# Patient Record
Sex: FEMALE | Race: WHITE | NOT HISPANIC OR LATINO | Employment: OTHER | URBAN - METROPOLITAN AREA
[De-identification: names, ages, dates, MRNs, and addresses within clinical notes are randomized per-mention and may not be internally consistent; named-entity substitution may affect disease eponyms.]

---

## 2017-01-05 ENCOUNTER — ANESTHESIA EVENT (OUTPATIENT)
Dept: PERIOP | Facility: AMBULARY SURGERY CENTER | Age: 57
End: 2017-01-05
Payer: COMMERCIAL

## 2017-01-06 ENCOUNTER — ANESTHESIA (OUTPATIENT)
Dept: PERIOP | Facility: AMBULARY SURGERY CENTER | Age: 57
End: 2017-01-06
Payer: COMMERCIAL

## 2017-01-06 ENCOUNTER — HOSPITAL ENCOUNTER (OUTPATIENT)
Facility: AMBULARY SURGERY CENTER | Age: 57
Setting detail: OUTPATIENT SURGERY
Discharge: HOME | End: 2017-01-06
Attending: ANESTHESIOLOGY | Admitting: ANESTHESIOLOGY
Payer: COMMERCIAL

## 2017-01-06 ENCOUNTER — HOSPITAL ENCOUNTER (OUTPATIENT)
Dept: RADIOLOGY | Facility: HOSPITAL | Age: 57
Setting detail: OUTPATIENT SURGERY
Discharge: HOME | End: 2017-01-06
Payer: COMMERCIAL

## 2017-01-06 VITALS
DIASTOLIC BLOOD PRESSURE: 56 MMHG | RESPIRATION RATE: 16 BRPM | OXYGEN SATURATION: 97 % | HEART RATE: 65 BPM | SYSTOLIC BLOOD PRESSURE: 111 MMHG | TEMPERATURE: 96.7 F

## 2017-01-06 PROCEDURE — 72020 X-RAY EXAM OF SPINE 1 VIEW: CPT

## 2017-01-06 RX ORDER — PROPOFOL 10 MG/ML
INJECTION, EMULSION INTRAVENOUS AS NEEDED
Status: DISCONTINUED | OUTPATIENT
Start: 2017-01-06 | End: 2017-01-06 | Stop reason: SURG

## 2017-01-06 RX ORDER — BUPIVACAINE HYDROCHLORIDE 5 MG/ML
INJECTION, SOLUTION EPIDURAL; INTRACAUDAL AS NEEDED
Status: DISCONTINUED | OUTPATIENT
Start: 2017-01-06 | End: 2017-01-06 | Stop reason: HOSPADM

## 2017-01-06 RX ORDER — SODIUM CHLORIDE, SODIUM LACTATE, POTASSIUM CHLORIDE, CALCIUM CHLORIDE 600; 310; 30; 20 MG/100ML; MG/100ML; MG/100ML; MG/100ML
100 INJECTION, SOLUTION INTRAVENOUS CONTINUOUS
Status: DISCONTINUED | OUTPATIENT
Start: 2017-01-06 | End: 2017-01-06 | Stop reason: HOSPADM

## 2017-01-06 RX ORDER — LIDOCAINE HYDROCHLORIDE 10 MG/ML
INJECTION, SOLUTION EPIDURAL; INFILTRATION; INTRACAUDAL; PERINEURAL AS NEEDED
Status: DISCONTINUED | OUTPATIENT
Start: 2017-01-06 | End: 2017-01-06 | Stop reason: HOSPADM

## 2017-01-06 RX ADMIN — PROPOFOL 30 MG: 10 INJECTION, EMULSION INTRAVENOUS at 10:57

## 2017-01-06 RX ADMIN — SODIUM CHLORIDE, SODIUM LACTATE, POTASSIUM CHLORIDE, AND CALCIUM CHLORIDE 100 ML/HR: .6; .31; .03; .02 INJECTION, SOLUTION INTRAVENOUS at 10:04

## 2017-01-09 ENCOUNTER — ALLSCRIPTS OFFICE VISIT (OUTPATIENT)
Dept: OTHER | Facility: OTHER | Age: 57
End: 2017-01-09

## 2017-02-08 ENCOUNTER — ALLSCRIPTS OFFICE VISIT (OUTPATIENT)
Dept: OTHER | Facility: OTHER | Age: 57
End: 2017-02-08

## 2017-02-17 ENCOUNTER — HOSPITAL ENCOUNTER (OUTPATIENT)
Dept: RADIOLOGY | Facility: HOSPITAL | Age: 57
Setting detail: OUTPATIENT SURGERY
Discharge: HOME | End: 2017-02-17
Payer: COMMERCIAL

## 2017-02-17 ENCOUNTER — HOSPITAL ENCOUNTER (OUTPATIENT)
Facility: AMBULARY SURGERY CENTER | Age: 57
Setting detail: OUTPATIENT SURGERY
Discharge: HOME | End: 2017-02-17
Attending: ANESTHESIOLOGY | Admitting: ANESTHESIOLOGY
Payer: COMMERCIAL

## 2017-02-17 VITALS
RESPIRATION RATE: 18 BRPM | TEMPERATURE: 97.6 F | DIASTOLIC BLOOD PRESSURE: 54 MMHG | HEART RATE: 78 BPM | OXYGEN SATURATION: 99 % | SYSTOLIC BLOOD PRESSURE: 108 MMHG

## 2017-02-17 DIAGNOSIS — R52 PAIN: ICD-10-CM

## 2017-02-17 PROCEDURE — 72100 X-RAY EXAM L-S SPINE 2/3 VWS: CPT

## 2017-02-17 RX ORDER — BUPIVACAINE HYDROCHLORIDE 5 MG/ML
INJECTION, SOLUTION PERINEURAL AS NEEDED
Status: DISCONTINUED | OUTPATIENT
Start: 2017-02-17 | End: 2017-02-17 | Stop reason: HOSPADM

## 2017-02-17 RX ORDER — FENTANYL CITRATE 50 UG/ML
INJECTION, SOLUTION INTRAMUSCULAR; INTRAVENOUS AS NEEDED
Status: DISCONTINUED | OUTPATIENT
Start: 2017-02-17 | End: 2017-02-17 | Stop reason: HOSPADM

## 2017-02-17 RX ORDER — SODIUM CHLORIDE, SODIUM LACTATE, POTASSIUM CHLORIDE, CALCIUM CHLORIDE 600; 310; 30; 20 MG/100ML; MG/100ML; MG/100ML; MG/100ML
125 INJECTION, SOLUTION INTRAVENOUS CONTINUOUS
Status: DISCONTINUED | OUTPATIENT
Start: 2017-02-17 | End: 2017-02-17 | Stop reason: HOSPADM

## 2017-02-17 RX ORDER — MIDAZOLAM HYDROCHLORIDE 1 MG/ML
INJECTION INTRAMUSCULAR; INTRAVENOUS AS NEEDED
Status: DISCONTINUED | OUTPATIENT
Start: 2017-02-17 | End: 2017-02-17 | Stop reason: HOSPADM

## 2017-02-17 RX ADMIN — SODIUM CHLORIDE, SODIUM LACTATE, POTASSIUM CHLORIDE, AND CALCIUM CHLORIDE 125 ML/HR: .6; .31; .03; .02 INJECTION, SOLUTION INTRAVENOUS at 08:03

## 2017-03-02 ENCOUNTER — ALLSCRIPTS OFFICE VISIT (OUTPATIENT)
Dept: OTHER | Facility: OTHER | Age: 57
End: 2017-03-02

## 2017-04-10 ENCOUNTER — ALLSCRIPTS OFFICE VISIT (OUTPATIENT)
Dept: OTHER | Facility: OTHER | Age: 57
End: 2017-04-10

## 2017-04-10 DIAGNOSIS — Z12.31 ENCOUNTER FOR SCREENING MAMMOGRAM FOR MALIGNANT NEOPLASM OF BREAST: ICD-10-CM

## 2017-04-18 ENCOUNTER — GENERIC CONVERSION - ENCOUNTER (OUTPATIENT)
Dept: OTHER | Facility: OTHER | Age: 57
End: 2017-04-18

## 2017-04-28 ENCOUNTER — HOSPITAL ENCOUNTER (OUTPATIENT)
Dept: RADIOLOGY | Facility: HOSPITAL | Age: 57
Setting detail: OUTPATIENT SURGERY
Discharge: HOME | End: 2017-04-28
Payer: COMMERCIAL

## 2017-04-28 ENCOUNTER — HOSPITAL ENCOUNTER (OUTPATIENT)
Facility: AMBULARY SURGERY CENTER | Age: 57
Setting detail: OUTPATIENT SURGERY
Discharge: HOME | End: 2017-04-28
Attending: ANESTHESIOLOGY | Admitting: ANESTHESIOLOGY
Payer: COMMERCIAL

## 2017-04-28 VITALS
SYSTOLIC BLOOD PRESSURE: 112 MMHG | DIASTOLIC BLOOD PRESSURE: 53 MMHG | OXYGEN SATURATION: 100 % | RESPIRATION RATE: 20 BRPM | HEART RATE: 59 BPM | TEMPERATURE: 97 F

## 2017-04-28 PROCEDURE — 72070 X-RAY EXAM THORAC SPINE 2VWS: CPT

## 2017-04-28 RX ORDER — SODIUM CHLORIDE, SODIUM LACTATE, POTASSIUM CHLORIDE, CALCIUM CHLORIDE 600; 310; 30; 20 MG/100ML; MG/100ML; MG/100ML; MG/100ML
125 INJECTION, SOLUTION INTRAVENOUS CONTINUOUS
Status: DISCONTINUED | OUTPATIENT
Start: 2017-04-28 | End: 2017-04-28 | Stop reason: HOSPADM

## 2017-04-28 RX ORDER — LIDOCAINE HYDROCHLORIDE 10 MG/ML
INJECTION, SOLUTION EPIDURAL; INFILTRATION; INTRACAUDAL; PERINEURAL AS NEEDED
Status: DISCONTINUED | OUTPATIENT
Start: 2017-04-28 | End: 2017-04-28 | Stop reason: HOSPADM

## 2017-04-28 RX ORDER — MIDAZOLAM HYDROCHLORIDE 1 MG/ML
INJECTION INTRAMUSCULAR; INTRAVENOUS AS NEEDED
Status: DISCONTINUED | OUTPATIENT
Start: 2017-04-28 | End: 2017-04-28 | Stop reason: HOSPADM

## 2017-04-28 RX ORDER — BUPIVACAINE HYDROCHLORIDE 5 MG/ML
INJECTION, SOLUTION EPIDURAL; INTRACAUDAL AS NEEDED
Status: DISCONTINUED | OUTPATIENT
Start: 2017-04-28 | End: 2017-04-28 | Stop reason: HOSPADM

## 2017-04-28 RX ADMIN — SODIUM CHLORIDE, SODIUM LACTATE, POTASSIUM CHLORIDE, AND CALCIUM CHLORIDE 125 ML/HR: .6; .31; .03; .02 INJECTION, SOLUTION INTRAVENOUS at 07:48

## 2017-05-10 ENCOUNTER — ALLSCRIPTS OFFICE VISIT (OUTPATIENT)
Dept: OTHER | Facility: OTHER | Age: 57
End: 2017-05-10

## 2017-05-19 ENCOUNTER — GENERIC CONVERSION - ENCOUNTER (OUTPATIENT)
Dept: OTHER | Facility: OTHER | Age: 57
End: 2017-05-19

## 2017-05-23 ENCOUNTER — GENERIC CONVERSION - ENCOUNTER (OUTPATIENT)
Dept: OTHER | Facility: OTHER | Age: 57
End: 2017-05-23

## 2017-05-24 ENCOUNTER — ALLSCRIPTS OFFICE VISIT (OUTPATIENT)
Dept: OTHER | Facility: OTHER | Age: 57
End: 2017-05-24

## 2017-05-26 ENCOUNTER — GENERIC CONVERSION - ENCOUNTER (OUTPATIENT)
Dept: OTHER | Facility: OTHER | Age: 57
End: 2017-05-26

## 2017-05-30 ENCOUNTER — GENERIC CONVERSION - ENCOUNTER (OUTPATIENT)
Dept: OTHER | Facility: OTHER | Age: 57
End: 2017-05-30

## 2017-06-02 ENCOUNTER — GENERIC CONVERSION - ENCOUNTER (OUTPATIENT)
Dept: OTHER | Facility: OTHER | Age: 57
End: 2017-06-02

## 2017-06-09 ENCOUNTER — ALLSCRIPTS OFFICE VISIT (OUTPATIENT)
Dept: OTHER | Facility: OTHER | Age: 57
End: 2017-06-09

## 2017-06-16 ENCOUNTER — GENERIC CONVERSION - ENCOUNTER (OUTPATIENT)
Dept: OTHER | Facility: OTHER | Age: 57
End: 2017-06-16

## 2017-06-16 ENCOUNTER — HOSPITAL ENCOUNTER (OUTPATIENT)
Facility: AMBULARY SURGERY CENTER | Age: 57
Setting detail: OUTPATIENT SURGERY
Discharge: HOME | End: 2017-06-16
Attending: ANESTHESIOLOGY | Admitting: ANESTHESIOLOGY
Payer: COMMERCIAL

## 2017-06-16 ENCOUNTER — HOSPITAL ENCOUNTER (OUTPATIENT)
Dept: RADIOLOGY | Facility: HOSPITAL | Age: 57
Setting detail: OUTPATIENT SURGERY
Discharge: HOME | End: 2017-06-16
Payer: COMMERCIAL

## 2017-06-16 VITALS
HEART RATE: 68 BPM | BODY MASS INDEX: 26.52 KG/M2 | OXYGEN SATURATION: 99 % | SYSTOLIC BLOOD PRESSURE: 102 MMHG | RESPIRATION RATE: 18 BRPM | DIASTOLIC BLOOD PRESSURE: 51 MMHG | TEMPERATURE: 98.2 F | WEIGHT: 145 LBS

## 2017-06-16 PROCEDURE — 72200 X-RAY EXAM SI JOINTS: CPT

## 2017-06-16 RX ORDER — MIDAZOLAM HYDROCHLORIDE 1 MG/ML
INJECTION INTRAMUSCULAR; INTRAVENOUS AS NEEDED
Status: DISCONTINUED | OUTPATIENT
Start: 2017-06-16 | End: 2017-06-16 | Stop reason: HOSPADM

## 2017-06-16 RX ORDER — SODIUM CHLORIDE, SODIUM LACTATE, POTASSIUM CHLORIDE, CALCIUM CHLORIDE 600; 310; 30; 20 MG/100ML; MG/100ML; MG/100ML; MG/100ML
50 INJECTION, SOLUTION INTRAVENOUS CONTINUOUS
Status: DISCONTINUED | OUTPATIENT
Start: 2017-06-16 | End: 2017-06-16 | Stop reason: HOSPADM

## 2017-06-16 RX ORDER — BUPIVACAINE HYDROCHLORIDE 2.5 MG/ML
INJECTION, SOLUTION EPIDURAL; INFILTRATION; INTRACAUDAL AS NEEDED
Status: DISCONTINUED | OUTPATIENT
Start: 2017-06-16 | End: 2017-06-16 | Stop reason: HOSPADM

## 2017-06-16 RX ADMIN — SODIUM CHLORIDE, SODIUM LACTATE, POTASSIUM CHLORIDE, AND CALCIUM CHLORIDE 50 ML/HR: .6; .31; .03; .02 INJECTION, SOLUTION INTRAVENOUS at 07:28

## 2017-07-06 ENCOUNTER — GENERIC CONVERSION - ENCOUNTER (OUTPATIENT)
Dept: OTHER | Facility: OTHER | Age: 57
End: 2017-07-06

## 2017-07-10 ENCOUNTER — ALLSCRIPTS OFFICE VISIT (OUTPATIENT)
Dept: OTHER | Facility: OTHER | Age: 57
End: 2017-07-10

## 2017-08-08 ENCOUNTER — ALLSCRIPTS OFFICE VISIT (OUTPATIENT)
Dept: OTHER | Facility: OTHER | Age: 57
End: 2017-08-08

## 2017-08-10 ENCOUNTER — GENERIC CONVERSION - ENCOUNTER (OUTPATIENT)
Dept: OTHER | Facility: OTHER | Age: 57
End: 2017-08-10

## 2017-09-20 ENCOUNTER — GENERIC CONVERSION - ENCOUNTER (OUTPATIENT)
Dept: OTHER | Facility: OTHER | Age: 57
End: 2017-09-20

## 2017-09-20 LAB
A/G RATIO (HISTORICAL): 1.9 (ref 1.2–2.2)
ALBUMIN SERPL BCP-MCNC: 4.1 G/DL (ref 3.5–5.5)
ALP SERPL-CCNC: 103 IU/L (ref 39–117)
ALT SERPL W P-5'-P-CCNC: 6 IU/L (ref 0–32)
AST SERPL W P-5'-P-CCNC: 17 IU/L (ref 0–40)
BASOPHILS # BLD AUTO: 0 %
BASOPHILS # BLD AUTO: 0 X10E3/UL (ref 0–0.2)
BILIRUB SERPL-MCNC: 0.2 MG/DL (ref 0–1.2)
BUN SERPL-MCNC: 12 MG/DL (ref 6–24)
BUN/CREA RATIO (HISTORICAL): 14 (ref 9–23)
CALCIUM SERPL-MCNC: 8.9 MG/DL (ref 8.7–10.2)
CHLORIDE SERPL-SCNC: 98 MMOL/L (ref 96–106)
CHOLEST SERPL-MCNC: 119 MG/DL (ref 100–199)
CO2 SERPL-SCNC: 27 MMOL/L (ref 18–29)
CREAT SERPL-MCNC: 0.87 MG/DL (ref 0.57–1)
DEPRECATED RDW RBC AUTO: 17.8 % (ref 12.3–15.4)
EGFR AFRICAN AMERICAN (HISTORICAL): 86 ML/MIN/1.73
EGFR-AMERICAN CALC (HISTORICAL): 74 ML/MIN/1.73
EOSINOPHIL # BLD AUTO: 0.1 X10E3/UL (ref 0–0.4)
EOSINOPHIL # BLD AUTO: 2 %
GLUCOSE SERPL-MCNC: 89 MG/DL (ref 65–99)
HCT VFR BLD AUTO: 35.9 % (ref 34–46.6)
HDLC SERPL-MCNC: 68 MG/DL
HGB BLD-MCNC: 11.6 G/DL (ref 11.1–15.9)
IMM.GRANULOCYTES (CD4/8) (HISTORICAL): 0 %
IMM.GRANULOCYTES (CD4/8) (HISTORICAL): 0 X10E3/UL (ref 0–0.1)
LDLC SERPL CALC-MCNC: 33 MG/DL (ref 0–99)
LYMPHOCYTES # BLD AUTO: 2.2 X10E3/UL (ref 0.7–3.1)
LYMPHOCYTES # BLD AUTO: 30 %
MCH RBC QN AUTO: 26.2 PG (ref 26.6–33)
MCHC RBC AUTO-ENTMCNC: 32.3 G/DL (ref 31.5–35.7)
MCV RBC AUTO: 81 FL (ref 79–97)
MONOCYTES # BLD AUTO: 0.3 X10E3/UL (ref 0.1–0.9)
MONOCYTES (HISTORICAL): 5 %
NEUTROPHILS # BLD AUTO: 4.6 X10E3/UL (ref 1.4–7)
NEUTROPHILS # BLD AUTO: 63 %
PLATELET # BLD AUTO: 245 X10E3/UL (ref 150–379)
POTASSIUM SERPL-SCNC: 3.9 MMOL/L (ref 3.5–5.2)
RBC (HISTORICAL): 4.43 X10E6/UL (ref 3.77–5.28)
SODIUM SERPL-SCNC: 141 MMOL/L (ref 134–144)
TOT. GLOBULIN, SERUM (HISTORICAL): 2.2 G/DL (ref 1.5–4.5)
TOTAL PROTEIN (HISTORICAL): 6.3 G/DL (ref 6–8.5)
TRIGL SERPL-MCNC: 92 MG/DL (ref 0–149)
WBC # BLD AUTO: 7.3 X10E3/UL (ref 3.4–10.8)

## 2017-09-21 ENCOUNTER — GENERIC CONVERSION - ENCOUNTER (OUTPATIENT)
Dept: OTHER | Facility: OTHER | Age: 57
End: 2017-09-21

## 2017-09-21 LAB
25(OH)D3 SERPL-MCNC: 30.1 NG/ML (ref 30–100)
HEPATITIS C ANTIBODY (HISTORICAL): <0.1 S/CO RATIO (ref 0–0.9)
INTERPRETATION (HISTORICAL): NORMAL
TSH SERPL DL<=0.05 MIU/L-ACNC: 2.64 UIU/ML (ref 0.45–4.5)

## 2017-09-23 LAB — VITAMIN A LEVEL (HISTORICAL): 11 UG/DL (ref 20–65)

## 2017-09-25 ENCOUNTER — GENERIC CONVERSION - ENCOUNTER (OUTPATIENT)
Dept: OTHER | Facility: OTHER | Age: 57
End: 2017-09-25

## 2017-10-02 ENCOUNTER — GENERIC CONVERSION - ENCOUNTER (OUTPATIENT)
Dept: OTHER | Facility: OTHER | Age: 57
End: 2017-10-02

## 2017-10-19 ENCOUNTER — GENERIC CONVERSION - ENCOUNTER (OUTPATIENT)
Dept: OTHER | Facility: OTHER | Age: 57
End: 2017-10-19

## 2017-11-06 ENCOUNTER — ALLSCRIPTS OFFICE VISIT (OUTPATIENT)
Dept: OTHER | Facility: OTHER | Age: 57
End: 2017-11-06

## 2017-11-07 LAB
AMPHETAMINE URINE (HISTORICAL): NEGATIVE NG/ML
BARBITURATES (HISTORICAL): NEGATIVE NG/ML
BENZODIAZEPINES (HISTORICAL): NEGATIVE NG/ML
CANNABINOIDS (HISTORICAL): NEGATIVE NG/ML
COCAINE URINE (HISTORICAL): NEGATIVE NG/ML
METHADONE (HISTORICAL): NEGATIVE NG/ML
OPIATES (HISTORICAL): NEGATIVE NG/ML
PHENCYCLIDINE URINE (HISTORICAL): NEGATIVE NG/ML
PROPOXYPHENE (HISTORICAL): NEGATIVE NG/ML

## 2017-11-07 NOTE — PROGRESS NOTES
Assessment  1  Chronic back pain (724 5,338 29) (M54 9,G89 29)    Plan  Chronic back pain    · Follow-up visit in 1 month Evaluation and Treatment  Follow-up  Status: Complete -  Scheduling  Done: 85OYU3468 01:56PM  Disc degeneration, lumbar    · FentaNYL 50 MCG/HR Transdermal Patch 72 Hour; APPLY 1 PATCH EVERY 3  DAYS (take with 12 mcg patch)   · Lyrica 75 MG Oral Capsule; TAKE 1 CAPSULE 3 times daily   · OxyCODONE HCl - 5 MG Oral Tablet; TAKE 1 TABLET EVERY 4 HOURS AS  NEEDED FOR PAIN  Opiate use    · ([facility]ly [facility]) [id number] Drug-Scr; Status: In Progress - Specimen/Data Collected;   Done:  30NFV9661    Discussion/Summary    Chronic back pain - urine drug screen is negative, but is wearing her patch  Will send urine to lab [facility] for further evaluation  She is down to 50 mcg of fentanyl and after adjusting will consider further dose reduction  to get her mammogram done  of opiate medications discussed  They are controlled dangerous substance  Opioids are highly addictive, even when taken as prescribed and there is a significant risk of developing a physical or psychological dependence  If you mix opiate medications with sedatives, benzodiazepines or alcohol fatal respiratory depression can occur  Chief Complaint  f/u medication review and chronic pain evaluation rmklpn      History of Present Illness  She is down to the 50 mcg patch  she is using her oxycodone as needed  are adjusting her depression medication  She is following with her psychiatrist       Active Problems  1  Adult body mass index 29 0-29 9 (V85 25) (Z68 29)   2  Allergic rhinitis (477 9) (J30 9)   3  Atypical chest pain (786 59) (R07 89)   4  Backache (724 5) (M54 9)   5  Chronic back pain (724 5,338 29) (M54 9,G89 29)   6  Depression (311) (F32 9)   7  Disc degeneration, lumbar (722 52) (M51 36)   8  Fibromyalgia (729 1) (M79 7)   9  Headache (784 0) (R51)   10  Herpes simplex infection (054 9) (B00 9)   11  Hysterectomy   12   Insomnia (780 52) (G47 00)   13  Iron deficiency anemia (280 9) (D50 9)   14  Itching (698 9) (L29 9)   15  Long term use of drug (V58 69) (Z79 899)   16  Lumbago (724 2) (M54 5)   17  Lymphadenopathy (785 6) (R59 1)   18  Menopausal disorder (627 9) (N95 9)   19  Migraine headache (346 90) (G43 909)   20  Myalgia And Myositis (729 1)   21  Need for hepatitis C screening test (V73 89) (Z11 59)   22  Nicotine dependence (305 1) (F17 200)   23  Obesity (278 00) (E66 9)   24  Opiate use (305 50) (F11 90)   25  Osteoarthritis of left knee (715 96) (M17 12)   26  Phlebitis, superficial (451 9) (I80 9)   27  Screening mammogram, encounter for (V76 12) (Z12 31)   28  Status post gastric bypass for obesity (V45 86) (Z98 84)   29  Tobacco use (305 1) (Z72 0)   30  Vitamin A deficiency (264 9) (E50 9)   31  Vitamin D deficiency (268 9) (E55 9)    Past Medical History  1  Acute maxillary sinusitis (461 0) (J01 00)   2  History of Acute maxillary sinusitis, recurrence not specified (461 0) (J01 00)   3  Acute upper respiratory infection (465 9) (J06 9)   4  History of Bilateral cellulitis of lower leg (682 6) (L03 116,L03 115)   5  History of Cellulitis (682 9) (L03 90)   6  Contact dermatitis due to plant (692 6) (L25 5)   7  Contact dermatitis due to plant (692 6) (L25 5)   8  History of Cough (786 2) (R05)   9  Dermatitis (692 9) (L30 9)   10  History of Encounter for screening mammogram for malignant neoplasm of breast    (V76 12) (Z12 31)   11  History of Hip pain, unspecified laterality   12  History of acute bronchitis (V12 69) (Z87 09)   13  History of acute sinusitis (V12 69) (Z87 09)   14  History of acute sinusitis (V12 69) (Z87 09)   15  History of influenza (V12 09) (Z87 09)   16  History of nicotine dependence (V15 82) (Z87 891)   17  History of sunburn (V13 3) (Z87 2)   18  History of upper respiratory infection (V12 09) (Z87 09)   19  Influenza vaccine needed (V04 81) (Z23)   20  Influenza vaccine needed (V04 81) (Z23)   21  History of Initial Medicare annual wellness visit (V70 0) (Z00 00)   22  Other acute sinusitis (461 8) (J01 80)   23  History of Screening examination for pulmonary tuberculosis (V74 1) (Z11 1)   24  History of Screening for diabetes mellitus (V77 1) (Z13 1)   25  History of Screening for hyperlipidemia (V77 91) (K96 950)    Surgical History  1  History of Knee Arthroscopy (Therapeutic)   2  History of Knee Replacement    Family History  Mother    1  Family history of hypertension (V17 49) (Z82 49)  Father    2  Family history of hypertension (V17 49) (Z82 49)    Social History   · Current every day smoker (305 1) (F17 200)   · Tobacco use (305 1) (Z72 0)    Current Meds   1  Chantix Continuing Month Marc 1 MG Oral Tablet; TAKE AS DIRECTED PER PACKAGE   INSTRUCTIONS; Therapy: 39ATY2277 to (Maggie Flores)  Requested for: 32BDB4692; Last   Rx:04Oct2017 Ordered   2  ClonazePAM 0 5 MG Oral Tablet; TAKE 1 TABLET EVERY 12 HOURS as needed   Recorded   3  Clotrimazole 1 % External Cream; APPLY SPARINGLY TO AFFECTED AREA TWICE A DAY; Therapy: 91SXK9790 to (Evaluate:18Oct2016)  Requested for: 20Jun2016; Last   Rx:20Jun2016 Ordered   4  FentaNYL 50 MCG/HR Transdermal Patch 72 Hour; APPLY 1 PATCH EVERY 3 DAYS   (take with 12 mcg patch); Therapy: 75FET9184 to (Evaluate:01Nov2017); Last Rx:02Oct2017 Ordered   5  Lyrica 75 MG Oral Capsule; TAKE 1 CAPSULE 3 times daily; Therapy: 74SNF3225 to (Evaluate:69Fqj0100); Last Rx:02Mar2017 Ordered   6  OxyCODONE HCl - 5 MG Oral Tablet; TAKE 1 TABLET EVERY 4 HOURS AS NEEDED   FOR PAIN;   Therapy: 36Xbi5125 to (Evaluate:01Nov2017); Last Rx:02Oct2017 Ordered   7  Pantoprazole Sodium 40 MG Oral Tablet Delayed Release; Take 1 tablet daily; Therapy: (Recorded:07Nov2016) to Recorded    Allergies  1   VIOXX    Vitals  Vital Signs    Recorded: 77SMC5111 01:26PM   Temperature 97 2 F   Heart Rate 78   Respiration 18   Systolic 358   Diastolic 70   Height 5 ft 5 in   Weight 165 lb    BMI Calculated 27 46   BSA Calculated 1 82     Physical Exam    Constitutional   General appearance: No acute distress, well appearing and well nourished  Ears, Nose, Mouth, and Throat   External inspection of ears and nose: Normal     Otoscopic examination: Tympanic membranes translucent with normal light reflex  Canals patent without erythema  Oropharynx: Normal with no erythema, edema, exudate or lesions  Pulmonary   Auscultation of lungs: Clear to auscultation  Cardiovascular   Auscultation of heart: Normal rate and rhythm, normal S1 and S2, without murmurs  Musculoskeletal   Inspection/palpation of joints, bones, and muscles: Abnormal  -- bilateral lower lumbar tenderness and scoliosis  Results/Data  PHQ-9 Adult Depression Screening 14DGF7772 01:25PM User, Bee There     Test Name Result Flag Reference   PHQ-9 Adult Depression Score 8     Over the last two weeks, how often have you been bothered by any of the following problems? Little interest or pleasure in doing things: More than half the days - 2  Feeling down, depressed, or hopeless: Several days - 1  Trouble falling or staying asleep, or sleeping too much: More than half the days - 2  Feeling tired or having little energy: More than half the days - 2  Poor appetite or over eating: Several days - 1  Feeling bad about yourself - or that you are a failure or have let yourself or your family down: Not at all - 0  Trouble concentrating on things, such as reading the newspaper or watching television: Not at all - 0  Moving or speaking so slowly that other people could have noticed   Or the opposite -  being so fidgety or restless that you have been moving around a lot more than usual: Not at all - 0  Thoughts that you would be better off dead, or of hurting yourself in some way: Not at all - 0   PHQ-9 Adult Depression Screening Negative     PHQ-9 Difficulty Level Not difficult at all     PHQ-9 Severity Mild Depression         Provider Comments    [place] Prescription Monitoring Program report reviewed and is appropriate, [place] and [place] included in search criteria      Health Management  History of Encounter for screening mammogram for malignant neoplasm of breast   Digital Bilateral Screening Mammogram With CAD; every 1 year; Last 18WPD5435; Next Due:  02NDN9229;  Overdue    Future Appointments    Date/Time Provider Specialty Site   12/06/2017 11:00 AM Esteban Ram[address]   Electronically signed by : Jules Mello DO; Nov 6 2017  2:38PM EST                       (Author)

## 2017-11-08 ENCOUNTER — GENERIC CONVERSION - ENCOUNTER (OUTPATIENT)
Dept: OTHER | Facility: OTHER | Age: 57
End: 2017-11-08

## 2017-11-16 ENCOUNTER — GENERIC CONVERSION - ENCOUNTER (OUTPATIENT)
Dept: OTHER | Facility: OTHER | Age: 57
End: 2017-11-16

## 2017-11-30 ENCOUNTER — GENERIC CONVERSION - ENCOUNTER (OUTPATIENT)
Dept: OTHER | Facility: OTHER | Age: 57
End: 2017-11-30

## 2017-12-06 ENCOUNTER — GENERIC CONVERSION - ENCOUNTER (OUTPATIENT)
Dept: OTHER | Facility: OTHER | Age: 57
End: 2017-12-06

## 2017-12-28 ENCOUNTER — GENERIC CONVERSION - ENCOUNTER (OUTPATIENT)
Dept: OTHER | Facility: OTHER | Age: 57
End: 2017-12-28

## 2017-12-28 ENCOUNTER — HOSPITAL ENCOUNTER (OUTPATIENT)
Dept: RADIOLOGY | Facility: HOSPITAL | Age: 57
Discharge: HOME | End: 2017-12-28
Payer: COMMERCIAL

## 2017-12-28 DIAGNOSIS — Z12.31 ENCOUNTER FOR SCREENING MAMMOGRAM FOR MALIGNANT NEOPLASM OF BREAST: ICD-10-CM

## 2017-12-28 PROCEDURE — G0202 SCR MAMMO BI INCL CAD: HCPCS

## 2018-01-05 ENCOUNTER — GENERIC CONVERSION - ENCOUNTER (OUTPATIENT)
Dept: OTHER | Facility: OTHER | Age: 58
End: 2018-01-05

## 2018-01-11 NOTE — RESULT NOTES
Discussion/Summary   Appointment 10/2/17   Please print and put in my folder  Dr Mary Myers      Verified Results  (1) VITAMIN A 18IGL3120 12:25PM Jennifer Nassar courtesy copy of this report has been sent to  [facility] in Clinical S[facility]       Test Name Result Flag Reference   Vitamin A, Serum 11 ug/dL L 20-65

## 2018-01-11 NOTE — RESULT NOTES
Discussion/Summary   Appointment 12/6/17   Please print and put in my folder  Dr Eamon Wayne      Verified Results  [facility]) [id number] Drug-Scr 44PWO2278 12:00AM Eliana Hernandez     Test Name Result Flag Reference   Methadone Negative ng/mL  Ttyecf=474   Drug Screen Comment: (Report)     This assay provides a preliminary unconfirmed analytical test result  that may be suitable for the clinical management of patients in  certain situations  For workplace drug testing programs, preliminary  positive findings should always be confirmed by an alternative method  Some over-the-counter medications, as well as adulterants, may cause  inaccurate results  Screen Only testing does not meet the [employer] of  [employer] Forensic Urine Drug Testing Program  requirements as a forensic urine drug test for workplace testing  All  clients must ensure that their testing program conforms to applicable  state and federal laws and employment agreements  This assay provides a preliminary unconfirmed analytical test result  that may be suitable for the clinical management of patients in  certain situations  For workplace drug testing programs, preliminary  positive findings should always be confirmed by an alternative method  Some over-the-counter medications, as well as adulterants, may cause  inaccurate results  Screen Only testing does not meet the [employer] of  [employer] Forensic Urine Drug Testing Program  requirements as a forensic urine drug test for workplace testing  All  clients must ensure that their testing program conforms to applicable  state and federal laws and employment agreements  Amphetamines, Urine Negative ng/mL  Jqggfg=4291   Amphetamine test includes Amphetamine and Methamphetamine     Barbiturates Negative ng/mL  Ktzrgg=267   Benzodiazepines Negative ng/mL  Fweuzn=454   Cannabinoid Negative ng/mL  Cutoff=50   Cocaine (Metab ) Negative ng/mL  Motppq=833   Opiates Negative ng/mL  Cuytmw=648   Opiate test includes Codeine and Morphine only     Phencyclidine Negative ng/mL  Cutoff=25   Propoxyphene, Urine Negative ng/mL  Heanma=790

## 2018-01-12 VITALS
BODY MASS INDEX: 25.76 KG/M2 | DIASTOLIC BLOOD PRESSURE: 72 MMHG | HEART RATE: 78 BPM | HEIGHT: 62 IN | TEMPERATURE: 97.6 F | WEIGHT: 140 LBS | RESPIRATION RATE: 18 BRPM | SYSTOLIC BLOOD PRESSURE: 118 MMHG

## 2018-01-12 NOTE — PROGRESS NOTES
Assessment    1  Disc degeneration, lumbar (722 52) (M51 36)   2  History of Knee Replacement    Plan  Disc degeneration, lumbar    · FentaNYL 12 MCG/HR Transdermal Patch 72 Hour; APPLY 1 PATCH EVERY 3  DAYS (take with 50mcg patch)   · FentaNYL 50 MCG/HR Transdermal Patch 72 Hour; APPLY 1 PATCH EVERY 3  DAYS (take with 12 mcg patch)   · Lyrica 75 MG Oral Capsule; TAKE 1 CAPSULE 3 times daily   · OxyCODONE HCl - 5 MG Oral Tablet; TAKE 1 TABLET EVERY 4 HOURS AS  NEEDED FOR PAIN    Chief Complaint  f/u medication review klpn      History of Present Illness  She is still having knee pain from her surgery  She has right hip pain and back pain  She is having a hard time sleeping  Active Problems    1  Allergic rhinitis (477 9) (J30 9)   2  Backache (724 5) (M54 9)   3  Depression (311) (F32 9)   4  Disc degeneration, lumbar (722 52) (M51 36)   5  Encounter for screening mammogram for malignant neoplasm of breast (V76 12)   (Z12 31)   6  Fibromyalgia (729 1) (M79 7)   7  Headache (784 0) (R51)   8  Herpes simplex infection (054 9) (B00 9)   9  Hysterectomy   10  Initial Medicare annual wellness visit (V70 0) (Z00 00)   11  Insomnia (780 52) (G47 00)   12  Iron deficiency anemia (280 9) (D50 9)   13  Long term use of drug (V58 69) (Z79 899)   14  Lumbago (724 2) (M54 5)   15  Lymphadenopathy (785 6) (R59 1)   16  Menopausal disorder (627 9) (N95 9)   17  Migraine headache (346 90) (G43 909)   18  Myalgia And Myositis (729 1)   19  Nicotine dependence (305 1) (F17 200)   20  Obesity (278 00) (E66 9)   21  Osteoarthritis of left knee (715 96) (M17 9)   22  Phlebitis, superficial (451 9) (I80 9)   23  Status post gastric bypass for obesity (V45 86) (Z98 84)   24  Sunburn (692 71) (L55 9)   25  Vitamin A deficiency (264 9) (E50 9)   26  Vitamin D deficiency (268 9) (E55 9)    Past Medical History    1  Acute maxillary sinusitis (461 0) (J01 00)   2   History of Acute maxillary sinusitis, recurrence not specified (461 0) (J01 00)   3  Acute upper respiratory infection (465 9) (J06 9)   4  History of Bilateral cellulitis of lower leg (682 6) (L03 116,L03 115)   5  History of Cellulitis (682 9) (L03 90)   6  Contact dermatitis due to plant (692 6) (L25 5)   7  History of Cough (786 2) (R05)   8  History of Encounter for screening mammogram for malignant neoplasm of breast   (V76 12) (Z12 31)   9  History of Hip pain, unspecified laterality   10  History of acute sinusitis (V12 69) (Z87 09)   11  History of acute sinusitis (V12 69) (Z87 09)   12  History of influenza (V12 09) (Z87 09)   13  History of nicotine dependence (V15 82) (Z87 891)   14  History of upper respiratory infection (V12 09) (Z87 09)   15  Influenza vaccine needed (V04 81) (Z23)   16  History of Screening examination for pulmonary tuberculosis (V74 1) (Z11 1)   17  History of Screening for diabetes mellitus (V77 1) (Z13 1)   18  History of Screening for hyperlipidemia (V77 91) (H88 383)    Surgical History    1  History of Knee Arthroscopy   2  History of Knee Replacement    Family History    1  Family history of hypertension (V17 49) (Z82 49)    2  Family history of hypertension (V17 49) (Z82 49)    Social History    · Current every day smoker (305 1) (F17 200)    Current Meds   1  Abilify 10 MG Oral Tablet; Take 1 tablet daily Recorded   2  Betamethasone Dipropionate 0 05 % External Cream; apply sparingly to affected area(s)   twice daily; Therapy: 28DTC4575 to (Last Rx:32Ljr3630)  Requested for: 61LUJ3181 Ordered   3  ClonazePAM 2 MG Oral Tablet; 1 Every Day At Bedtime; Therapy: 40Mzi8385 to  Requested for: 46JTY2301 Recorded   4  Clotrimazole 1 % External Cream; APPLY SPARINGLY TO AFFECTED AREA(S) TWICE   DAILY; Therapy: 99IIK4825 to (Last Rx:69Ouy7024)  Requested for: 40ZML7058 Ordered   5  DULoxetine HCl - 60 MG Oral Capsule Delayed Release Particles; TAKE 1 CAPSULE   Daily  Requested for: 23OOJ2534; Last Rx:22Ufa9728 Ordered   6   DULoxetine HCl - 60 MG Oral Capsule Delayed Release Particles; take 1 capsule daily; Therapy: 42Pbj7293 to (Evaluate:05Jan2017)  Requested for: 63KKZ5111; Last   Rx:11Jan2016 Ordered   7  FentaNYL 12 MCG/HR Transdermal Patch 72 Hour; APPLY 1 PATCH EVERY 3 DAYS   (take with 50mcg patch); Therapy: 66AWG4550 to (Evaluate:07Feb2016); Last BI:67HPN4957 Ordered   8  FentaNYL 50 MCG/HR Transdermal Patch 72 Hour; APPLY 1 PATCH EVERY 3 DAYS   (take with 12 mcg patch); Therapy: 75AAW2217 to (Evaluate:07Feb2016); Last OS:00TKP5340 Ordered   9  Flonase Allergy Relief 50 MCG/ACT Nasal Suspension; 2 sprays each nostril daily; Therapy: 02Apr2015 to (Last Rx:02Apr2015) Ordered   10  Lyrica 75 MG Oral Capsule; TAKE 1 CAPSULE 3 times daily; Therapy: 04XYS0456 to (Evaluate:98Dmu3405); Last IS:63RII4282 Ordered   11  OxyCODONE HCl - 5 MG Oral Tablet; TAKE 1 TABLET EVERY 4 HOURS AS NEEDED    FOR PAIN;    Therapy: 29Apr2014 to (Evaluate:04Apr2015); Last Rx:05Mar2015 Ordered   12  Triamcinolone Acetonide 0 1 % External Cream; apply bid as directed, short term; Therapy: 37ZVU0060 to (Last Rx:70Qjq9159)  Requested for: 74Ekw4728 Ordered   13  Zolpidem Tartrate ER 6 25 MG Oral Tablet Extended Release; Therapy: 68RFL4535 to (Evaluate:21Jun2014) Recorded    Allergies    1  VIOXX    Vitals  Vital Signs [Data Includes: Current Encounter]    Recorded: Q5808032 02:50PM   Temperature 97 2 F   Heart Rate 78   Respiration 18   Systolic 911   Diastolic 66   Height 5 ft 1 5 in   Weight 170 lb    BMI Calculated 31 6   BSA Calculated 1 77     Physical Exam    Constitutional   General appearance: No acute distress, well appearing and well nourished  Ears, Nose, Mouth, and Throat   External inspection of ears and nose: Normal     Otoscopic examination: Tympanic membranes translucent with normal light reflex  Canals patent without erythema  Oropharynx: Normal with no erythema, edema, exudate or lesions      Pulmonary   Auscultation of lungs: Clear to auscultation  Cardiovascular   Auscultation of heart: Normal rate and rhythm, normal S1 and S2, without murmurs  Musculoskeletal   Gait and station: Abnormal   slow gait, using walker  Health Management  History of Encounter for screening mammogram for malignant neoplasm of breast   Digital Bilateral Screening Mammogram With CAD; every 1 year; Last 01ELA1811; Next Due:  32MGE1526;  Active    Signatures   Electronically signed by : Sharyn Candelaria DO; Feb 4 2016  3:04PM EST                       (Author)

## 2018-01-13 VITALS
RESPIRATION RATE: 18 BRPM | BODY MASS INDEX: 24.5 KG/M2 | DIASTOLIC BLOOD PRESSURE: 70 MMHG | SYSTOLIC BLOOD PRESSURE: 110 MMHG | HEIGHT: 62 IN | HEART RATE: 72 BPM | TEMPERATURE: 98.2 F | WEIGHT: 133.13 LBS

## 2018-01-13 VITALS
SYSTOLIC BLOOD PRESSURE: 106 MMHG | HEIGHT: 62 IN | RESPIRATION RATE: 16 BRPM | DIASTOLIC BLOOD PRESSURE: 60 MMHG | HEART RATE: 80 BPM | WEIGHT: 149 LBS | TEMPERATURE: 96 F | BODY MASS INDEX: 27.42 KG/M2

## 2018-01-13 VITALS
RESPIRATION RATE: 18 BRPM | HEART RATE: 76 BPM | WEIGHT: 135 LBS | DIASTOLIC BLOOD PRESSURE: 76 MMHG | BODY MASS INDEX: 24.84 KG/M2 | HEIGHT: 62 IN | TEMPERATURE: 97.2 F | SYSTOLIC BLOOD PRESSURE: 122 MMHG

## 2018-01-13 VITALS
BODY MASS INDEX: 27.49 KG/M2 | RESPIRATION RATE: 18 BRPM | SYSTOLIC BLOOD PRESSURE: 112 MMHG | TEMPERATURE: 97.2 F | WEIGHT: 165 LBS | HEART RATE: 78 BPM | DIASTOLIC BLOOD PRESSURE: 70 MMHG | HEIGHT: 65 IN

## 2018-01-14 VITALS
RESPIRATION RATE: 20 BRPM | BODY MASS INDEX: 27.42 KG/M2 | TEMPERATURE: 97.2 F | SYSTOLIC BLOOD PRESSURE: 122 MMHG | HEART RATE: 68 BPM | DIASTOLIC BLOOD PRESSURE: 76 MMHG | WEIGHT: 149 LBS | HEIGHT: 62 IN

## 2018-01-14 VITALS
TEMPERATURE: 96.3 F | HEART RATE: 84 BPM | RESPIRATION RATE: 20 BRPM | DIASTOLIC BLOOD PRESSURE: 72 MMHG | WEIGHT: 151 LBS | SYSTOLIC BLOOD PRESSURE: 116 MMHG | HEIGHT: 62 IN | BODY MASS INDEX: 27.79 KG/M2

## 2018-01-14 VITALS
TEMPERATURE: 97.2 F | HEART RATE: 72 BPM | SYSTOLIC BLOOD PRESSURE: 110 MMHG | RESPIRATION RATE: 18 BRPM | BODY MASS INDEX: 25.76 KG/M2 | HEIGHT: 62 IN | WEIGHT: 140 LBS | DIASTOLIC BLOOD PRESSURE: 70 MMHG

## 2018-01-14 VITALS
SYSTOLIC BLOOD PRESSURE: 114 MMHG | HEIGHT: 65 IN | DIASTOLIC BLOOD PRESSURE: 76 MMHG | TEMPERATURE: 96.5 F | BODY MASS INDEX: 24.99 KG/M2 | RESPIRATION RATE: 16 BRPM | HEART RATE: 84 BPM | WEIGHT: 150 LBS

## 2018-01-16 NOTE — RESULT NOTES
Discussion/Summary   Appointment 10/2/17   Please print and put in my folder  Dr Ravi Shine      Verified Results  (1) VITAMIN D 25-HYDROXY 48Ujn8575 12:25PM [employer]     Test Name Result Flag Reference   Vitamin D, 25-Hydroxy 30 1 ng/mL  30 0-100 0   Vitamin D deficiency has been defined by the [address] practice guideline as a  level of serum 25-OH vitamin D less than 20 ng/mL (1,2)  The Endocrine Society went on to further define vitamin D  insufficiency as a level between 21 and 29 ng/mL (2)  1  IOM ([place] of [employer])  2010  Dietary reference     intakes for calcium and D  [facility]: The     CSS99  2  Helen MF, [place] PASCAL, Sulma RODRIGUEZ, et al      Evaluation, treatment, and prevention of vitamin D     deficiency: an Endocrine Society clinical practice     guideline  JC  2011 Jul; 96(7):1911-30  (1) CBC/PLT/DIFF 49WLF7566 12:25PM [employer]     Test Name Result Flag Reference   WBC 7 3 x10E3/uL  3 4-10 8   RBC 4 43 x10E6/uL  3 77-5 28   Hemoglobin 11 6 g/dL  11 1-15 9   Hematocrit 35 9 %  34 0-46  6   MCV 81 fL  79-97   MCH 26 2 pg L 26 6-33 0   MCHC 32 3 g/dL  31 5-35 7   RDW 17 8 % H 12 3-15 4   Platelets 013 H71Z4/LY  150-379   Neutrophils 63 %     Lymphs 30 %     Monocytes 5 %     Eos 2 %     Basos 0 %     Neutrophils (Absolute) 4 6 x10E3/uL  1 4-7 0   Lymphs (Absolute) 2 2 x10E3/uL  0 7-3 1   Monocytes(Absolute) 0 3 x10E3/uL  0 1-0 9   Eos (Absolute) 0 1 x10E3/uL  0 0-0 4   Baso (Absolute) 0 0 x10E3/uL  0 0-0 2   Immature Granulocytes 0 %     Immature Grans (Abs) 0 0 x10E3/uL  0 0-0 1     (1) COMPREHENSIVE METABOLIC PANEL 10FMG3463 13:18ZB [employer]     Test Name Result Flag Reference   Glucose, Serum 89 mg/dL  65-99   BUN 12 mg/dL  6-24   Creatinine, Serum 0 87 mg/dL  0 57-1 00   BUN/Creatinine Ratio 14  9-23   Sodium, Serum 141 mmol/L  134-144   Potassium, Serum 3 9 mmol/L  3 5-5 2   Chloride, Serum 98 mmol/L   Carbon Dioxide, Total 27 mmol/L  18-29   Calcium, Serum 8 9 mg/dL  8 7-10 2   Protein, Total, Serum 6 3 g/dL  6 0-8 5   Albumin, Serum 4 1 g/dL  3 5-5 5   Globulin, Total 2 2 g/dL  1 5-4 5   A/G Ratio 1 9  1 2-2 2   Bilirubin, Total 0 2 mg/dL  0 0-1 2   Alkaline Phosphatase, S 103 IU/L     AST (SGOT) 17 IU/L  0-40   ALT (SGPT) 6 IU/L  0-32   eGFR If NonAfricn Am 74 mL/min/1 73  >59   eGFR If Africn Am 86 mL/min/1 73  >59     (1) LIPID PANEL FASTING W DIRECT LDL REFLEX 53Tjp5366 12:25PM [employer]     Test Name Result Flag Reference   Cholesterol, Total 119 mg/dL  100-199   Triglycerides 92 mg/dL  0-149   HDL Cholesterol 68 mg/dL  >39   LDL Cholesterol Calc 33 mg/dL  0-99     (1) TSH 65Csb0894 12:25PM [employer]     Test Name Result Flag Reference   TSH 2 640 uIU/mL  0 450-4 500     (LC) HCV Antibody 11HFR3365 12:25PM [employer]     Test Name Result Flag Reference   Hep C Virus Ab <0 1 s/co ratio  0 0-0 9   Negative:     < 0 8                                              Indeterminate: 0 8 - 0 9                                                   Positive:     > 0 9                  The CDC recommends that a positive HCV antibody result                  be followed up with a HCV Nucleic Acid Amplification                  test ([id number])

## 2018-01-17 NOTE — RESULT NOTES
Message   Appointment 8/5/16   Please print and put in my folder   Dr Pamela Lugo     Verified Results  (1) CBC/PLT/DIFF [id number] 01:30PM Keanu Meng     Test Name Result Flag Reference   WBC 12 3 x10E3/uL H 3 4-10 8   RBC 4 45 x10E6/uL  3 77-5 28   Hemoglobin 12 2 g/dL  11 1-15 9   Hematocrit 37 6 %  34 0-46  6   MCV 85 fL  79-97   MCH 27 4 pg  26 6-33 0   MCHC 32 4 g/dL  31 5-35 7   RDW 16 8 % H 12 3-15 4   Platelets 576 H87D7/TC  150-379   Neutrophils 74 %     Lymphs 22 %     Monocytes 4 %     Eos 0 %     Basos 0 %     Neutrophils (Absolute) 8 9 x10E3/uL H 1 4-7 0   Lymphs (Absolute) 2 8 x10E3/uL  0 7-3 1   Monocytes(Absolute) 0 5 x10E3/uL  0 1-0 9   Eos (Absolute) 0 0 x10E3/uL  0 0-0 4   Baso (Absolute) 0 0 x10E3/uL  0 0-0 2   Immature Granulocytes 0 %     Immature Grans (Abs) 0 0 x10E3/uL  0 0-0 1     (1) COMPREHENSIVE METABOLIC PANEL [id number] 41:19KM Keanu Meng     Test Name Result Flag Reference   Glucose, Serum 90 mg/dL  65-99   BUN 18 mg/dL  6-24   Creatinine, Serum 0 76 mg/dL  0 57-1 00   eGFR If NonAfricn Am 88 mL/min/1 73  >59   eGFR If Africn Am 101 mL/min/1 73  >59   BUN/Creatinine Ratio 24 H 9-23   Sodium, Serum 142 mmol/L  134-144   Potassium, Serum 3 6 mmol/L  3 5-5 2   Chloride, Serum 99 mmol/L     Carbon Dioxide, Total 26 mmol/L  18-29   Calcium, Serum 9 0 mg/dL  8 7-10 2   Protein, Total, Serum 5 9 g/dL L 6 0-8 5   Albumin, Serum 3 9 g/dL  3 5-5 5   Globulin, Total 2 0 g/dL  1 5-4 5   A/G Ratio 2 0  1 1-2 5   Bilirubin, Total 0 3 mg/dL  0 0-1 2   Alkaline Phosphatase, S 89 IU/L     AST (SGOT) 15 IU/L  0-40   ALT (SGPT) 11 IU/L  0-32     (1) LIPID PANEL FASTING W DIRECT LDL REFLEX [id number] 01:30PM Keanu Meng     Test Name Result Flag Reference   Cholesterol, Total 113 mg/dL  100-199   Triglycerides 107 mg/dL  0-149   HDL Cholesterol 72 mg/dL  >39   According to ATP-III Guidelines, HDL-C >59 mg/dL is considered a  negative risk factor for CHD     LDL Cholesterol Calc 20 mg/dL  0-99 (1) TSH [id number] 01:30PM [facility]     Test Name Result Flag Reference   TSH 1 560 uIU/mL  0 450-4 500     (1) VITAMIN D 25-HYDROXY [id number] 01:30PM [facility]     Test Name Result Flag Reference   Vitamin D, 25-Hydroxy 51 1 ng/mL  30 0-100 0   Vitamin D deficiency has been defined by the [address] practice guideline as a  level of serum 25-OH vitamin D less than 20 ng/mL (1,2)  The Endocrine Society went on to further define vitamin D  insufficiency as a level between 21 and 29 ng/mL (2)  1  IOM ([place] of Medicine)  2010  Dietary reference     intakes for calcium and D  [facility]: The     [facility]  2  Helen MF, Obinna PASCAL, Sulma RODRIGUEZ, et al      Evaluation, treatment, and prevention of vitamin D     deficiency: an Endocrine Society clinical practice     guideline  JCEM  2011 Jul; 96(7):1911-30  (1) VITAMIN A [id number] 01:30PM Elin Barrios courtesy copy of this report has been sent to  [facility]  Test Name Result Flag Reference   Vitamin A, Serum 8 ug/dL L 20-65     (1) VITAMIN B12 [id number] 01:30PM [facility]     Test Name Result Flag Reference   Vitamin B12 365 pg/mL  211-946     (1) MAGNESIUM [id number] 01:30PM [facility]     Test Name Result Flag Reference   Magnesium, Serum 1 9 mg/dL  1 6-2 3     [facility]) Thyroxine (T4) Free, Direct, [place] [id number] 01:30PM [facility]     Test Name Result Flag Reference   T4,Free(Direct) 1 02 ng/dL  0 82-1 77     [facility]) Cardiovascular Risk Assessment [id number] 01:30PM [facility]     Test Name Result Flag Reference   Interpretation Note     Supplement report is available  PDF Image

## 2018-01-18 NOTE — MISCELLANEOUS
Message   Recorded as Task   Date: 05/23/2017 05:25 PM, Created By: Ulises Valerio   Task Name: Medical Complaint Callback   Assigned To: Jeffy Lopes   Regarding Patient: Bakari Carter, Status: Active   Comment:    Gabby Pantoja - 23 May 2017 5:25 PM     TASK CREATED  Caller: Self; Medical Complaint; [phone] (Home)  pt wanted to be seen tonight, we are booked  she is having wheezing, and severe cough  please call her and advise  Gabby Pantoja - 23 May 2017 7:04 PM     TASK EDITED  pt called again, I apologized for the delay  Made them appt for tomorrow, offered first one in morning with   nurse practitioner, pt chose to wait until afternoon to see Dr Saad Drummond   stated if she gets worse he will bring her into the ER  I offered Urgent care as well     Thuy Colindres - 23 May 2017 7:15 PM     TASK REASSIGNED: Previously Assigned To [address] [place] [address] - 23 May 2017 9:32 PM     TASK EDITED  ok        Signatures   Electronically signed by : Kaden Guerra DO; May 23 2017  9:32PM EST                       (Author)

## 2018-01-22 VITALS
HEIGHT: 65 IN | DIASTOLIC BLOOD PRESSURE: 78 MMHG | HEART RATE: 82 BPM | BODY MASS INDEX: 26.16 KG/M2 | RESPIRATION RATE: 18 BRPM | WEIGHT: 157 LBS | TEMPERATURE: 97.2 F | SYSTOLIC BLOOD PRESSURE: 110 MMHG

## 2018-01-23 NOTE — RESULT NOTES
Discussion/Summary   Jennie Irvin,   Your mammogram is normal    Dr Nafisa Goodwin CAD [id number] 01:07PM Cameron Lima Order Number: [id number]    - Patient Instructions: To schedule this appointment, please contact Central Scheduling at [phone]  Do not wear any perfume, powder, lotion or deodorant on breast or underarm area  Please bring your doctors order, referral (if needed) and insurance information with you on the day of the test  Failure to bring this information may result in this test being rescheduled  Arrive 15 minutes prior to your appointment time to register  On the day of your test, please bring any prior mammogram or breast studies with you that were not performed at a [facility]  Failure to bring prior exams may result in your test needing to be rescheduled  Test Name Result Flag Reference   MAMMO SCREENING BILATERAL W CAD (Report)     Patient History:   Patient is postmenopausal    Patient's BMI is 28 9  Reason for exam: screening, asymptomatic  Mammo Screening Bilateral W CAD: December 28, 2017 - Check In #:    [de-identified]   Bilateral MLO and CC view(s) were taken  XCCL view(s) were taken   of the left breast      Technologist: Chelsi Starks   Prior study comparison: October 13, 2015, [facility] screening    bilateral digital jamar performed at [address]  July 15, 2014, bilateral screening mammogram performed    at [address]  There are scattered fibroglandular densities  The parenchymal pattern appears stable  No dominant soft tissue    mass or suspicious calcifications are noted  The skin and nipple   contours are within normal limits  No mammographic evidence of malignancy  No    significant changes when compared with prior studies  ACR BI-RADSï¾® Assessments: BiRad:1 - Negative     Recommendation:   Routine screening mammogram in 1 year     Analyzed by CAD     The patient is scheduled in a reminder system for screening    mammography  8-10% of cancers will be missed on mammography  Management of a    palpable abnormality must be based on clinical grounds  Patients   will be notified of their results via letter from our facility  Accredited by [employer] of [employer] and FDA       Transcription Location: [facility] [address]: [id number]     Risk Value(s):   Tyrer-Cuzick 10 Year: 2 800%, Tyrer-Cuzick Lifetime: 8 000%,    Myriad Table: 1 5%, DANIE 5 Year: 1 4%, NCI Lifetime: 8 7%   Signed by:   Oksana Stroud MD   12/28/17

## 2018-01-24 VITALS
TEMPERATURE: 96.7 F | BODY MASS INDEX: 28.66 KG/M2 | WEIGHT: 172 LBS | HEART RATE: 84 BPM | SYSTOLIC BLOOD PRESSURE: 98 MMHG | RESPIRATION RATE: 16 BRPM | DIASTOLIC BLOOD PRESSURE: 56 MMHG | HEIGHT: 65 IN

## 2018-01-24 VITALS
DIASTOLIC BLOOD PRESSURE: 60 MMHG | SYSTOLIC BLOOD PRESSURE: 120 MMHG | RESPIRATION RATE: 16 BRPM | HEART RATE: 80 BPM | TEMPERATURE: 97.9 F | BODY MASS INDEX: 28.95 KG/M2 | WEIGHT: 174 LBS

## 2018-01-28 PROBLEM — G89.29 CHRONIC BACK PAIN: Status: ACTIVE | Noted: 2017-04-10

## 2018-01-28 PROBLEM — F11.90 OPIATE USE: Status: ACTIVE | Noted: 2017-07-10

## 2018-01-28 PROBLEM — M54.9 CHRONIC BACK PAIN: Status: ACTIVE | Noted: 2017-04-10

## 2018-01-28 RX ORDER — ESCITALOPRAM OXALATE 20 MG/1
20 TABLET ORAL DAILY
Refills: 0 | COMMUNITY
Start: 2017-11-08 | End: 2018-03-09 | Stop reason: ALTCHOICE

## 2018-02-07 ENCOUNTER — TELEPHONE (OUTPATIENT)
Dept: FAMILY MEDICINE CLINIC | Facility: CLINIC | Age: 58
End: 2018-02-07

## 2018-02-07 DIAGNOSIS — G89.29 CHRONIC BILATERAL LOW BACK PAIN WITHOUT SCIATICA: Primary | ICD-10-CM

## 2018-02-07 DIAGNOSIS — M54.50 CHRONIC BILATERAL LOW BACK PAIN WITHOUT SCIATICA: Primary | ICD-10-CM

## 2018-02-07 RX ORDER — FENTANYL 50 UG/H
1 PATCH TRANSDERMAL
Qty: 5 PATCH | Refills: 0 | Status: SHIPPED | OUTPATIENT
Start: 2018-02-07 | End: 2018-02-07 | Stop reason: SDUPTHER

## 2018-02-07 RX ORDER — FENTANYL 50 UG/H
1 PATCH TRANSDERMAL
Qty: 10 PATCH | Refills: 0 | Status: SHIPPED | OUTPATIENT
Start: 2018-02-07 | End: 2018-03-09 | Stop reason: SDUPTHER

## 2018-02-07 RX ORDER — OXYCODONE HYDROCHLORIDE 5 MG/1
5 CAPSULE ORAL EVERY 4 HOURS PRN
Qty: 90 CAPSULE | Refills: 0 | Status: SHIPPED | OUTPATIENT
Start: 2018-02-07 | End: 2018-03-09 | Stop reason: SDUPTHER

## 2018-02-07 RX ORDER — OXYCODONE HYDROCHLORIDE 5 MG/1
5 CAPSULE ORAL EVERY 4 HOURS PRN
Qty: 30 CAPSULE | Refills: 0 | Status: SHIPPED | OUTPATIENT
Start: 2018-02-07 | End: 2018-02-07 | Stop reason: SDUPTHER

## 2018-02-07 NOTE — TELEPHONE ENCOUNTER
DR HAYWARD    Patient had to cancel her appt for today for an AWV  She wants to know if she can have her refills called in and she will reschedule for next month  ?   Please advise

## 2018-02-07 NOTE — TELEPHONE ENCOUNTER
2/7/2018 10:03 AM spoke with Ranjeet  Her pain is about the same  She is unable to travel today due to snow and sleet  She will have her   her prescriptions and follow up in the office in 1 month  Risks of opiate medications discussed  They are controlled dangerous substance   Opioids are highly addictive, even when taken as prescribed and there is a significant risk of developing a physical or psychological dependence   If you mix opiate medications with sedatives, benzodiazepines or alcohol fatal respiratory depression can occur  [place] prescription monitoring program [place] reviewed and is appropriate  Marsha included in search criteria  Prescriptions put in patient pickup

## 2018-03-09 ENCOUNTER — OFFICE VISIT (OUTPATIENT)
Dept: FAMILY MEDICINE CLINIC | Facility: CLINIC | Age: 58
End: 2018-03-09
Payer: COMMERCIAL

## 2018-03-09 VITALS
BODY MASS INDEX: 29.52 KG/M2 | HEART RATE: 76 BPM | DIASTOLIC BLOOD PRESSURE: 60 MMHG | RESPIRATION RATE: 18 BRPM | WEIGHT: 177.4 LBS | SYSTOLIC BLOOD PRESSURE: 112 MMHG | TEMPERATURE: 97 F

## 2018-03-09 DIAGNOSIS — M51.36 DISC DEGENERATION, LUMBAR: ICD-10-CM

## 2018-03-09 DIAGNOSIS — D50.8 IRON DEFICIENCY ANEMIA SECONDARY TO INADEQUATE DIETARY IRON INTAKE: ICD-10-CM

## 2018-03-09 DIAGNOSIS — Z23 NEED FOR VACCINATION: ICD-10-CM

## 2018-03-09 DIAGNOSIS — Z00.00 MEDICARE ANNUAL WELLNESS VISIT, SUBSEQUENT: Primary | ICD-10-CM

## 2018-03-09 DIAGNOSIS — M79.7 FIBROMYALGIA: ICD-10-CM

## 2018-03-09 DIAGNOSIS — M54.50 CHRONIC BILATERAL LOW BACK PAIN WITHOUT SCIATICA: ICD-10-CM

## 2018-03-09 DIAGNOSIS — G89.29 CHRONIC BILATERAL LOW BACK PAIN WITHOUT SCIATICA: ICD-10-CM

## 2018-03-09 DIAGNOSIS — E50.9 VITAMIN A DEFICIENCY: ICD-10-CM

## 2018-03-09 PROCEDURE — 3725F SCREEN DEPRESSION PERFORMED: CPT | Performed by: FAMILY MEDICINE

## 2018-03-09 PROCEDURE — G0439 PPPS, SUBSEQ VISIT: HCPCS | Performed by: FAMILY MEDICINE

## 2018-03-09 RX ORDER — TESTOSTERONE CYPIONATE 200 MG/ML
INJECTION INTRAMUSCULAR
COMMUNITY
Start: 2018-01-09 | End: 2018-06-20

## 2018-03-09 RX ORDER — PREGABALIN 75 MG/1
75 CAPSULE ORAL 3 TIMES DAILY
Qty: 270 CAPSULE | Refills: 1 | Status: SHIPPED | OUTPATIENT
Start: 2018-03-09 | End: 2018-04-09 | Stop reason: SDUPTHER

## 2018-03-09 RX ORDER — OXYCODONE HYDROCHLORIDE 5 MG/1
TABLET ORAL
Refills: 0 | COMMUNITY
Start: 2018-02-08 | End: 2018-03-09 | Stop reason: SDUPTHER

## 2018-03-09 RX ORDER — FENTANYL 50 UG/H
1 PATCH TRANSDERMAL
Qty: 10 PATCH | Refills: 0 | Status: SHIPPED | OUTPATIENT
Start: 2018-03-09 | End: 2018-04-09 | Stop reason: SDUPTHER

## 2018-03-09 RX ORDER — OXYCODONE HYDROCHLORIDE 5 MG/1
5 CAPSULE ORAL EVERY 4 HOURS PRN
Qty: 90 CAPSULE | Refills: 0 | Status: SHIPPED | OUTPATIENT
Start: 2018-03-09 | End: 2018-04-09 | Stop reason: SDUPTHER

## 2018-03-09 RX ORDER — ARIPIPRAZOLE 10 MG/1
TABLET ORAL
COMMUNITY
Start: 2018-02-27 | End: 2018-08-03 | Stop reason: ALTCHOICE

## 2018-03-09 RX ORDER — VORTIOXETINE 20 MG/1
TABLET, FILM COATED ORAL
COMMUNITY
Start: 2018-02-14 | End: 2018-06-20

## 2018-03-09 NOTE — PROGRESS NOTES
HPI:  Lynn Hale is a 62 y o  female here for her Subsequent Wellness Visit      Patient Active Problem List   Diagnosis    Allergic rhinitis    Chronic bilateral low back pain without sciatica    Depression    Disc degeneration, lumbar    Fibromyalgia    Herpes simplex infection    Iron deficiency anemia secondary to inadequate dietary iron intake    Migraine headache    Opiate use    Vitamin A deficiency    Vitamin D deficiency     Past Medical History:   Diagnosis Date    Arthritis     Depression     Scoliosis      Past Surgical History:   Procedure Laterality Date    APPENDECTOMY      CHOLECYSTECTOMY      DILATION AND CURETTAGE OF UTERUS      GASTRIC BYPASS      HYSTERECTOMY      JOINT REPLACEMENT Right     knee    KNEE ARTHROSCOPY      LAST ASSESSED [id number]    NERVE BLOCK Right 1/6/2017    Procedure: LUMBAR MEDIAL BRANCH BLOCK L3, L4, L5;  Surgeon: Cristin Whiteside MD;  Location: [place] [address] MAIN OR;  Service:     NERVE BLOCK Right 8/12/2016    Procedure: BLOCK MEDIAL BRANCH T9, T10, T11, T12;  Surgeon: Cristin Whiteside MD;  Location: [place] [address] MAIN OR;  Service:     KY INJ DX/THER AGNT PARAVERT FACET [address], 1ST LEVEL Left 4/28/2017    Procedure: THORACIC MEDIAL BRANCH BLOCK T8-9, T10-11;  Surgeon: Cristin Whiteside MD;  Location: [place] [address] MAIN OR;  Service: Pain Management     [place] INJECTION,SACROILIAC JOINT Right 6/16/2017    Procedure: SI JOINT INJECTION;  Surgeon: Cristin Whiteside MD;  Location: [place] [address] MAIN OR;  Service: Pain Management     RADIOFREQUENCY ABLATION Right 2/17/2017    Procedure: ABLATION RADIO FREQUENCY (RFA)  L3-4, L4-5, L5-S1;  Surgeon: Cristin Whiteside MD;  Location: [place] [address] MAIN OR;  Service:     RADIOFREQUENCY ABLATION Right 9/23/2016    Procedure: ABLATION RADIO FREQUENCY (RFA)  T11, T12, L1, L2;  Surgeon: Cristin Whiteside MD;  Location: [place] [address] MAIN OR;  Service:     REPLACEMENT TOTAL KNEE      LAST ASSESSED [id number]     Family History   Problem Relation Age of Onset    Hypertension Mother     Hypertension Father      History   Smoking Status    Current Every Day Smoker    Packs/day: 1 00    Years: 40 00    Types: Cigarettes   Smokeless Tobacco    Never Used     History   Alcohol Use No      History   Drug Use No     /60   Pulse 76   Temp (!) 97 °F (36 1 °C)   Resp 18   Wt 80 5 kg (177 lb 6 4 oz)   BMI 29 52 kg/m²       Current Outpatient Prescriptions   Medication Sig Dispense Refill    ARIPiprazole (ABILIFY) 10 mg tablet       clonazePAM (KlonoPIN) 0 5 mg tablet Take 0 5 mg by mouth 2 (two) times a day   dexamethasone (DECADRON) 120 mg/30 mL SOLN       fentaNYL (DURAGESIC) 50 mcg/hr Place 1 patch on the skin every third day Max Daily Amount: 1 patch 10 patch 0    oxyCODONE (OXY-IR) 5 MG capsule Take 1 capsule (5 mg total) by mouth every 4 (four) hours as needed for moderate pain Max Daily Amount: 30 mg 90 capsule 0    pantoprazole (PROTONIX) 40 mg tablet Take 40 mg by mouth daily   pregabalin (LYRICA) 75 mg capsule Take 1 capsule (75 mg total) by mouth 3 (three) times a day 270 capsule 1    TRINTELLIX 20 MG TABS       Zoster Vac Recomb Adjuvanted (SHINGRIX) 50 MCG SUSR Inject 0 5 mL into the shoulder, thigh, or buttocks daily Inject once and then again in 2-6 months 1 each 0     No current facility-administered medications for this visit        Allergies   Allergen Reactions    Vioxx [Rofecoxib] Rash     Immunization History   Administered Date(s) Administered    Influenza Quadrivalent Preservative Free 3 years and older IM 01/05/2018    Influenza Quadrivalent, 6-35 Months IM 09/15/2015, 10/05/2016    Influenza TIV (IM) 12/11/2012, 10/29/2013    Pneumococcal Polysaccharide PPV23 10/29/2013       Patient Care Team:  Saad Richardson, DO as PCP - General  MD Mayte Gonzalez MD Reagan Share, DO    Medicare Screening Tests and Risk Assessments:  AWV Clinical     ISAR:   Previous hospitalizations?:  No       Once in a Lifetime Medicare Screening:   EKG performed?:  Yes Results:  in chart   AAA screening performed? (if performed, please add date to Health Maintenance):  No       Medicare Screening Tests and Risk Assessment:   AAA Risk Assessment    None Indicated:  Yes    Osteoporosis Risk Assessment    :  Yes    Age over 48:  Yes    HIV Risk Assessment    None indicated:  Yes        Drug and Alcohol Use:   Tobacco use    Cigarettes:  current smoker    Tobacco use duration    Tobacco Cessation Readiness    Alcohol use    Alcohol use:  occasional use    Alcohol Treatment Readiness   Illicit Drug Use    Drug use:  never        Diet & Exercise:   Diet   What is your diet?:  Regular   How many servings a day of the following:   Exercise    Do you currently exercise?:  currently not exercising       Cognitive Impairment Screening:   Depression screening preformed:  Yes     PHQ-9 Depression scale score:  23   Cognitive Impairment Screening    Do you have difficulty learning or retaining new information?:  No Do you have difficulty handling new tasks?:  No   Do you have difficulty with reasoning?:  No Do you have difficulty with spatial ability and orientation?:  No   Do you have difficulty with language?:  No Do you have difficulty with behavior?:  No       Functional Ability/Level of Safety:   Hearing    Hearing difficulties:  No    Hearing aid:  No    Hearing Impairment Assessment    Current Activities    Status:  limited driving, limited ADL's, limited social activities, unlimited IADL's   Help needed with the folllowing:    Using the phone:  No Transportation:  No   Shopping:  No Preparing Meals:  No   Doing Housework:  No Doing Laundry:  No   Managing Medications:  No Managing Money:  No   ADL    Fall Risk   Have you fallen in the last 12 months?:  Yes    Injury History       Home Safety:   Are there hazards in your environment?:  No   Do you feel unsteady when walking?:  No    Home Safety Risk Factors   Unfamilar with surroundings:  No Uneven floors:  No   Stairs or handrail saftey risk:  No Loose rugs:  No   Household clutter:  No Poor household lighting:  No   No grab bars in bathroom:  No Further evaluation needed:  No       Advanced Directives:   Advanced Directives    Living Will:  No Durable POA for healthcare:  No   Advanced directive:  No    Patient's End of Life Decisions    Reviewed with patient:  Yes Provider agrees with end of life decisions:  Yes   End of life decisions comments: We discussed end of life planning  Due to her of only 62 she does not feel ready for an advanced directive at this time  She has spoken to her  about her wishes  Urinary Incontinence:   Do you have urinary incontinence?:  No        Glaucoma:            Provider Screening     Preventative Screening/Counseling:   Cardiovascular Screening/Counseling:   (Labs Q5 years, EKG optional one-time)   General:  Screening Current           Diabetes Screening/Counseling:   (2 tests/year if Pre-Diabetes or 1 test/year if no Diabetes)   General:  Screening Current           Colorectal Cancer Screening/Counseling:   (FOBT Q1 yr; Flex Sig Q4 yrs or Q10 yrs after Screening Colonoscopy; Screening Colonoscpy Q2 yrs High Risk or Q10 yrs Low Risk; Barium Enema Q2 yrs High Risk or Q4 yrs Low Risk)   General:  Screening Current           Prostate Cancer Screening/Counseling:   (Annual)          Breast Cancer Screening/Counseling:   (Baseline Age 28 - 43; Annual Age 36+)   General:  Screening Current          Cervical Cancer Screening/Counseling:   (Annual for High Risk or Childbearing Age with Abnormal Pap in Last 3 yrs;  Every 2 all others)   General:  Screening Not Indicated           Osteoporosis Screening/Counseling:   (Every 2 Yrs if at risk or more if medically necessary)   General:  Screening Not Indicated           AAA Screening/Counseling:   (Once per Lifetime with risk factors)    General:  Screening Not Indicated           Glaucoma Screening/Counseling:   (Annual)   General:  Screening Current          HIV Screening/Counseling:   (Voluntary; Once annually for high risk OR 3 times for Pregnancy at diagnosis of IUP; 3rd trimester; and at Labor   General:  Screening Not Indicated           Hepatitis C Screening:             Immunizations:   Influenza (annual): Influenza UTD This Year   Hepatitis B Series (low risk patients):  Series Not Indicated   Zostavax (Medicare D Coverage, Pt >70 yo):  Risks & Benefits Discussed       Other Preventative Couseling (Non-Medicare Wellness Visit Required):   nutrition counseling performed, fall prevention education provided, weight reduction was discussed, Increased physical activity counseling given       Referrals (Non-Medicare Wellness Visit Required):       Medical Equipment/Suppliers:           No exam data present    Physical Exam :  Physical Exam   Constitutional: She is oriented to person, place, and time  She appears well-developed and well-nourished  HENT:   Head: Normocephalic and atraumatic  Right Ear: External ear normal    Left Ear: External ear normal    Nose: Nose normal    Mouth/Throat: Oropharynx is clear and moist    Cardiovascular: Normal rate, regular rhythm and normal heart sounds  Exam reveals no friction rub  No murmur heard  Pulmonary/Chest: Breath sounds normal  No respiratory distress  She has no wheezes  She has no rales  Musculoskeletal: She exhibits no edema  Tenderness: bilateral lumbar tenderness  Lumbar scoliosis   Neurological: She is oriented to person, place, and time  No cranial nerve deficit  Nursing note and vitals reviewed  Reviewed Updated [facility] Prior Wellness Visits:   Last Medicare wellness visit information was reviewed, patient interviewed , no change since last AWVno  Last Medicare wellness visit information was reviewed, patient interviewed and updates made to the record today yes    Assessment and Plan:  1   Medicare annual wellness visit, subsequent     2  Vitamin A deficiency  Vitamin A   3  Iron deficiency anemia secondary to inadequate dietary iron intake  CBC    TIBC    Iron   4  Disc degeneration, lumbar     5  Chronic bilateral low back pain without sciatica  fentaNYL (DURAGESIC) 50 mcg/hr    oxyCODONE (OXY-IR) 5 MG capsule   6  Fibromyalgia  pregabalin (LYRICA) 75 mg capsule   7  Need for vaccination  Zoster Vac Recomb Adjuvanted ([address]) 48 MCG SUSR     Vaccines - She will be a candidate for pneumonia vaccine at age 72,   Depression - not controlled, she is following with NP who manages her psychiatric issues  Chronic back pain - worsening recently, goal is to get down on less medication  Risks of opiate medications discussed  They are controlled dangerous substance   Opioids are highly addictive, even when taken as prescribed and there is a significant risk of developing a physical or psychological dependence   If you mix opiate medications with sedatives, benzodiazepines or alcohol fatal respiratory depression can occur  [place] reviewed and is appropriate  Marsha included in search criteria           Health Maintenance Due   Topic Date Due    HIV SCREENING  1960    PAP SMEAR  1960    Depression Screening PHQ-9  02/19/1972    DTaP,Tdap,and Td Vaccines (1 - Tdap) 02/19/1981    Lung Cancer Screening  02/19/2015

## 2018-04-07 NOTE — PROGRESS NOTES
Assessment/Plan:    Problem List Items Addressed This Visit     Chronic bilateral low back pain without sciatica - Primary     Pain not controlled  Will not lower dose of fentanyl at this time         Relevant Medications    fentaNYL (DURAGESIC) 50 mcg/hr    oxyCODONE (OXY-IR) 5 MG capsule    Fibromyalgia     Pain not controlled  Depression is worsening         Relevant Medications    pregabalin (LYRICA) 75 mg capsule    Opiate use    Relevant Orders    POCT urine drug screen (Completed)      Risks of opiate medications discussed  They are controlled dangerous substance   Opioids are highly addictive, even when taken as prescribed and there is a significant risk of developing a physical or psychological dependence   If you mix opiate medications with sedatives, benzodiazepines or alcohol fatal respiratory depression can occur  [place] reviewed and is appropriate  [place] and [place] included in search criteria  Patient Instructions   Recommend Dr Peter Mcbride for pain management        No Follow-up on file  Subjective:      Patient ID: Eliana Olson is a 62 y o  female  Chief Complaint   Patient presents with    Follow-up     medications       She put her last patch on today  She has been feeling terrible  Her depression is not good  She sees her psychiatrist next week  She is not ready to lower anything for her pain  She is 7 out of 10 pain  The pain is in the middle of her back  Fibromyalgia - pain not controlled,  Feeing depressed  She is tolerating lyrica  The following portions of the patient's history were reviewed and updated as appropriate:  past social history    Review of Systems      Current Outpatient Prescriptions   Medication Sig Dispense Refill    ARIPiprazole (ABILIFY) 10 mg tablet       clonazePAM (KlonoPIN) 0 5 mg tablet Take 0 5 mg by mouth 2 (two) times a day        fentaNYL (DURAGESIC) 50 mcg/hr Place 1 patch on the skin every third day Max Daily Amount: 1 patch 10 patch 0    oxyCODONE (OXY-IR) 5 MG capsule Take 1 capsule (5 mg total) by mouth every 4 (four) hours as needed for moderate pain Max Daily Amount: 30 mg 90 capsule 0    pantoprazole (PROTONIX) 40 mg tablet Take 40 mg by mouth daily   pregabalin (LYRICA) 75 mg capsule Take 1 capsule (75 mg total) by mouth 3 (three) times a day 270 capsule 1    TRINTELLIX 20 MG TABS       dexamethasone (DECADRON) 120 mg/30 mL SOLN        No current facility-administered medications for this visit  Objective:    /68   Pulse 72   Temp (!) 95 5 °F (35 3 °C)   Resp 18   Ht 5' 2" (1 575 m)   Wt 80 3 kg (177 lb)   BMI 32 37 kg/m²        Physical Exam   Constitutional: She appears well-developed and well-nourished  HENT:   Head: Normocephalic and atraumatic  Right Ear: External ear normal    Left Ear: External ear normal    Mouth/Throat: Oropharynx is clear and moist    Cardiovascular: Normal rate, regular rhythm and normal heart sounds  Exam reveals no friction rub  No murmur heard  Pulmonary/Chest: Effort normal and breath sounds normal  No respiratory distress  She has no wheezes  She has no rales  Musculoskeletal: She exhibits tenderness (thoracic midline tenderness)  She exhibits no edema or deformity  Nursing note and vitals reviewed               Maximus Nguyễn DO

## 2018-04-09 ENCOUNTER — OFFICE VISIT (OUTPATIENT)
Dept: FAMILY MEDICINE CLINIC | Facility: CLINIC | Age: 58
End: 2018-04-09
Payer: COMMERCIAL

## 2018-04-09 ENCOUNTER — TELEPHONE (OUTPATIENT)
Dept: FAMILY MEDICINE CLINIC | Facility: CLINIC | Age: 58
End: 2018-04-09

## 2018-04-09 VITALS
DIASTOLIC BLOOD PRESSURE: 68 MMHG | SYSTOLIC BLOOD PRESSURE: 126 MMHG | HEIGHT: 62 IN | TEMPERATURE: 95.5 F | HEART RATE: 72 BPM | BODY MASS INDEX: 32.57 KG/M2 | RESPIRATION RATE: 18 BRPM | WEIGHT: 177 LBS

## 2018-04-09 DIAGNOSIS — M79.7 FIBROMYALGIA: ICD-10-CM

## 2018-04-09 DIAGNOSIS — F11.90 OPIATE USE: ICD-10-CM

## 2018-04-09 DIAGNOSIS — G89.29 CHRONIC BILATERAL LOW BACK PAIN WITHOUT SCIATICA: Primary | ICD-10-CM

## 2018-04-09 DIAGNOSIS — M54.50 CHRONIC BILATERAL LOW BACK PAIN WITHOUT SCIATICA: Primary | ICD-10-CM

## 2018-04-09 LAB
SL AMB POCT AMPHETAMINES URINE: ABNORMAL
SL AMB POCT COCAINE URINE: ABNORMAL
SL AMB POCT METHAMPETAMINES URINE: ABNORMAL
SL AMB POCT OPIATES URINE: ABNORMAL
SL AMB POCT PHENCYCLIDINE URINE: ABNORMAL
SL AMB THC URINE: ABNORMAL

## 2018-04-09 PROCEDURE — 80305 DRUG TEST PRSMV DIR OPT OBS: CPT | Performed by: FAMILY MEDICINE

## 2018-04-09 PROCEDURE — 99214 OFFICE O/P EST MOD 30 MIN: CPT | Performed by: FAMILY MEDICINE

## 2018-04-09 RX ORDER — OXYCODONE HYDROCHLORIDE 5 MG/1
5 CAPSULE ORAL EVERY 4 HOURS PRN
Qty: 90 CAPSULE | Refills: 0 | Status: SHIPPED | OUTPATIENT
Start: 2018-04-09 | End: 2018-05-07

## 2018-04-09 RX ORDER — FENTANYL 50 UG/H
1 PATCH TRANSDERMAL
Qty: 10 PATCH | Refills: 0 | Status: SHIPPED | OUTPATIENT
Start: 2018-04-09 | End: 2018-05-07 | Stop reason: SDUPTHER

## 2018-04-09 RX ORDER — PREGABALIN 75 MG/1
75 CAPSULE ORAL 3 TIMES DAILY
Qty: 270 CAPSULE | Refills: 1 | Status: SHIPPED | OUTPATIENT
Start: 2018-04-09 | End: 2018-07-03 | Stop reason: SDUPTHER

## 2018-04-09 NOTE — TELEPHONE ENCOUNTER
Chyna Goetz called and left a message stating that the   Pain management doctor she was referred to   Requires you to speak to/contact them   Please call Chyna Goetz as per her request

## 2018-04-10 NOTE — TELEPHONE ENCOUNTER
4/10/2018 8:20 AM Returned call to [facility]  I need to call Dr Amado Newton to see if she meets criteria  [phone]    I told her I would call their office    Sj Francisco, DO

## 2018-04-12 NOTE — TELEPHONE ENCOUNTER
4/12/2018 8:01 AM Called [facility]  I didn't need to call they just need her records  He will review her information and then make a decision  Records need to be faxed to below number  [phone]      Please fax her last visit note from me, her spine x-rays that are in Epic that show she was getting injections from Dr Christopher Geller and the MRI that is in [employer]  They need everything pertaining to her back  Once this is done please let Elo Rothman know we went the records and are now waiting Dr Azeb Lucas decision      Taqueria Todd, DO

## 2018-04-12 NOTE — TELEPHONE ENCOUNTER
Faxed all records listed below to number listed below  Waiting for a confirmation   Leeanna Gutiérrez, [place]

## 2018-04-12 NOTE — TELEPHONE ENCOUNTER
Spoke with pt, she is aware we faxed and received confirmation and now just waiting on drs answer  No further action needed  Orly Coburn MA

## 2018-04-16 ENCOUNTER — TELEPHONE (OUTPATIENT)
Dept: FAMILY MEDICINE CLINIC | Facility: CLINIC | Age: 58
End: 2018-04-16

## 2018-04-16 NOTE — TELEPHONE ENCOUNTER
Micaela Collado is trying to get in to see a pain management doctor, Juan J, we recently faxed an office note, xrays and mri  Micaela Collado just talked to them and she said they told her they need the last 6 months of office notes related to her pain management   Please fax to [phone]

## 2018-05-04 NOTE — PROGRESS NOTES
Assessment/Plan:    Problem List Items Addressed This Visit     Chronic bilateral low back pain without sciatica     Pain not controlled  Plan to see pain management         Relevant Medications    oxyCODONE (OXY-IR) 5 MG capsule    fentaNYL (DURAGESIC) 50 mcg/hr      Risks of opiate medications discussed  They are controlled dangerous substance   Opioids are highly addictive, even when taken as prescribed and there is a significant risk of developing a physical or psychological dependence   If you mix opiate medications with sedatives, benzodiazepines or alcohol fatal respiratory depression can occur  [place] prescription monitoring program report reviewed and is appropriate  [place] and [place] included in search criteria  There are no Patient Instructions on file for this visit  Return in about 1 month (around 6/7/2018) for Next scheduled follow up  Subjective:      Patient ID: Trevor Monzon is a 62 y o  female  Chief Complaint   Patient presents with    Follow-up     chronic pain in back  fibromialgia  rmklpn       She is still having significant back pain  She is waiting to get an appointment with a new pain management doctor  Her pain is a 5 out of 10 today  The following portions of the patient's history were reviewed and updated as appropriate:  past social history    Review of Systems   Musculoskeletal: Positive for back pain  Current Outpatient Prescriptions   Medication Sig Dispense Refill    ARIPiprazole (ABILIFY) 10 mg tablet       clonazePAM (KlonoPIN) 0 5 mg tablet Take 0 5 mg by mouth 2 (two) times a day        dexamethasone (DECADRON) 120 mg/30 mL SOLN       fentaNYL (DURAGESIC) 50 mcg/hr Place 1 patch on the skin every third day Max Daily Amount: 1 patch 10 patch 0    oxyCODONE (OXY-IR) 5 MG capsule Take 1 capsule (5 mg total) by mouth every 4 (four) hours as needed for moderate pain Max Daily Amount: 30 mg 90 capsule 0    pantoprazole (PROTONIX) 40 mg tablet Take 40 mg by mouth daily   pregabalin (LYRICA) 75 mg capsule Take 1 capsule (75 mg total) by mouth 3 (three) times a day 270 capsule 1    TRINTELLIX 20 MG TABS        No current facility-administered medications for this visit  Objective:    /72   Pulse 78   Temp (!) 97 2 °F (36 2 °C)   Resp 18   Ht 5' 2" (1 575 m)   Wt 80 3 kg (177 lb)   BMI 32 37 kg/m²        Physical Exam   Constitutional: She appears well-developed and well-nourished  HENT:   Head: Normocephalic and atraumatic  Right Ear: External ear normal    Left Ear: External ear normal    Nose: Nose normal    Mouth/Throat: Oropharynx is clear and moist    Cardiovascular: Normal rate, regular rhythm and normal heart sounds  Pulmonary/Chest: Effort normal and breath sounds normal  No respiratory distress  She has no wheezes  Musculoskeletal:   Bilateral lumbar paraspinal muscle tenderness   Nursing note and vitals reviewed               Janes Louis DO

## 2018-05-07 ENCOUNTER — OFFICE VISIT (OUTPATIENT)
Dept: FAMILY MEDICINE CLINIC | Facility: CLINIC | Age: 58
End: 2018-05-07
Payer: COMMERCIAL

## 2018-05-07 VITALS
DIASTOLIC BLOOD PRESSURE: 72 MMHG | TEMPERATURE: 97.2 F | HEIGHT: 62 IN | HEART RATE: 78 BPM | RESPIRATION RATE: 18 BRPM | WEIGHT: 177 LBS | SYSTOLIC BLOOD PRESSURE: 116 MMHG | BODY MASS INDEX: 32.57 KG/M2

## 2018-05-07 DIAGNOSIS — G89.29 CHRONIC BILATERAL LOW BACK PAIN WITHOUT SCIATICA: ICD-10-CM

## 2018-05-07 DIAGNOSIS — M54.50 CHRONIC BILATERAL LOW BACK PAIN WITHOUT SCIATICA: ICD-10-CM

## 2018-05-07 PROCEDURE — 99213 OFFICE O/P EST LOW 20 MIN: CPT | Performed by: FAMILY MEDICINE

## 2018-05-07 RX ORDER — OXYCODONE HYDROCHLORIDE 5 MG/1
5 CAPSULE ORAL EVERY 4 HOURS PRN
Qty: 90 CAPSULE | Refills: 0 | Status: SHIPPED | OUTPATIENT
Start: 2018-05-07 | End: 2019-09-04 | Stop reason: SDUPTHER

## 2018-05-07 RX ORDER — FENTANYL 50 UG/H
1 PATCH TRANSDERMAL
Qty: 10 PATCH | Refills: 0 | Status: SHIPPED | OUTPATIENT
Start: 2018-05-07 | End: 2021-01-01 | Stop reason: HOSPADM

## 2018-06-01 ENCOUNTER — TELEPHONE (OUTPATIENT)
Dept: FAMILY MEDICINE CLINIC | Facility: CLINIC | Age: 58
End: 2018-06-01

## 2018-06-01 DIAGNOSIS — F17.200 TOBACCO DEPENDENCE: Primary | ICD-10-CM

## 2018-06-01 NOTE — TELEPHONE ENCOUNTER
Pt left a message on the Rx line, sts she was here earlier with  and forgot to mention she has a bad breakout of poison ivy could you please escribe her medication and she would also like to start chantix to help her stop smoking, please advise if she needs a ov   yamil

## 2018-06-02 DIAGNOSIS — L25.5 DERMATITIS DUE TO PLANTS, INCLUDING POISON IVY, SUMAC, AND OAK: Primary | ICD-10-CM

## 2018-06-02 RX ORDER — METHYLPREDNISOLONE 4 MG/1
TABLET ORAL
Qty: 21 TABLET | Refills: 0 | Status: SHIPPED | OUTPATIENT
Start: 2018-06-02 | End: 2018-06-20

## 2018-06-02 NOTE — TELEPHONE ENCOUNTER
She refused appointment today  Can we order the medrol dose pack she had it many times before for this passion rash?

## 2018-06-02 NOTE — TELEPHONE ENCOUNTER
Can we send chantix without an appointment? She wanted to know because she had this before and wanted to restart  She is aware that medrol dose pack sent

## 2018-06-04 RX ORDER — VARENICLINE TARTRATE 1 MG/1
1 TABLET, FILM COATED ORAL 2 TIMES DAILY
Qty: 60 TABLET | Refills: 5 | Status: SHIPPED | OUTPATIENT
Start: 2018-06-04 | End: 2018-08-03 | Stop reason: ALTCHOICE

## 2018-06-04 RX ORDER — VARENICLINE TARTRATE 25 MG
KIT ORAL
Qty: 53 TABLET | Refills: 0 | Status: SHIPPED | OUTPATIENT
Start: 2018-06-04 | End: 2018-08-03 | Stop reason: ALTCHOICE

## 2018-06-04 RX ORDER — VARENICLINE TARTRATE 25 MG
KIT ORAL
Qty: 53 TABLET | Refills: 0 | Status: SHIPPED | OUTPATIENT
Start: 2018-06-04 | End: 2018-06-04 | Stop reason: SDUPTHER

## 2018-06-04 NOTE — TELEPHONE ENCOUNTER
New prescription for Chantix starter pack as well as the regular prescription sent to [facility]  Please let her know that it was sent    Dr Autumn Garcia

## 2018-06-20 ENCOUNTER — OFFICE VISIT (OUTPATIENT)
Dept: FAMILY MEDICINE CLINIC | Facility: CLINIC | Age: 58
End: 2018-06-20
Payer: COMMERCIAL

## 2018-06-20 ENCOUNTER — TELEPHONE (OUTPATIENT)
Dept: FAMILY MEDICINE CLINIC | Facility: CLINIC | Age: 58
End: 2018-06-20

## 2018-06-20 VITALS
HEIGHT: 62 IN | DIASTOLIC BLOOD PRESSURE: 68 MMHG | HEART RATE: 100 BPM | BODY MASS INDEX: 32.02 KG/M2 | SYSTOLIC BLOOD PRESSURE: 122 MMHG | TEMPERATURE: 97.6 F | RESPIRATION RATE: 18 BRPM | WEIGHT: 174 LBS

## 2018-06-20 DIAGNOSIS — L25.5 CONTACT DERMATITIS DUE TO PLANT: Primary | ICD-10-CM

## 2018-06-20 PROCEDURE — 99213 OFFICE O/P EST LOW 20 MIN: CPT | Performed by: NURSE PRACTITIONER

## 2018-06-20 RX ORDER — FENTANYL 25 UG/H
PATCH TRANSDERMAL
Refills: 0 | COMMUNITY
Start: 2018-06-05 | End: 2018-06-20

## 2018-06-20 RX ORDER — BACLOFEN 10 MG/1
10 TABLET ORAL 2 TIMES DAILY
Refills: 0 | COMMUNITY
Start: 2018-05-31 | End: 2021-01-01

## 2018-06-20 RX ORDER — OXYCODONE HYDROCHLORIDE 5 MG/1
5 TABLET ORAL 4 TIMES DAILY
Refills: 0 | COMMUNITY
Start: 2018-06-05 | End: 2018-06-20

## 2018-06-20 RX ORDER — ARIPIPRAZOLE 15 MG/1
TABLET ORAL
COMMUNITY
Start: 2018-06-15 | End: 2018-06-20

## 2018-06-20 NOTE — TELEPHONE ENCOUNTER
Pt needs stat CT authorized and scheduled for 6/21/18  She prefers to go later in the afternoon d/t work schedule  Please see my note from 6/20/18    Rhoda Villegas

## 2018-06-20 NOTE — PROGRESS NOTES
Assessment/Plan:    Advised on treatment as below  Continue Benadryl prn  Problem List Items Addressed This Visit     None      Visit Diagnoses     Contact dermatitis due to plant    -  Primary    Relevant Medications    betamethasone valerate (VALISONE) 0 1 % cream          There are no Patient Instructions on file for this visit  Return if symptoms worsen or fail to improve  Subjective:      Patient ID: Trevor Monzon is a 62 y o  female  Chief Complaint   Patient presents with   Mark Earing on  arms and face     pt states not going away  af/rma        Here today with poison ivy  She took her last pill of a medrol judy today  She has some new spots on both arms  She is applying OTC hydrocortisone and taking Benadryl  Previous rash has cleared  The following portions of the patient's history were reviewed and updated as appropriate: allergies, current medications, past family history, past medical history, past social history, past surgical history and problem list     Review of Systems   Constitutional: Negative  Respiratory: Negative for shortness of breath and wheezing  Skin: Positive for rash (see HPI)  All other systems reviewed and are negative  Current Outpatient Prescriptions   Medication Sig Dispense Refill    ARIPiprazole (ABILIFY) 10 mg tablet Take 1 5 tablets daily       baclofen 10 mg tablet Take 10 mg by mouth 2 (two) times a day  0    clonazePAM (KlonoPIN) 0 5 mg tablet Take 0 5 mg by mouth 2 (two) times a day   fentaNYL (DURAGESIC) 50 mcg/hr Place 1 patch on the skin every third day Max Daily Amount: 1 patch 10 patch 0    oxyCODONE (OXY-IR) 5 MG capsule Take 1 capsule (5 mg total) by mouth every 4 (four) hours as needed for moderate pain Max Daily Amount: 30 mg 90 capsule 0    pantoprazole (PROTONIX) 40 mg tablet Take 40 mg by mouth daily        pregabalin (LYRICA) 75 mg capsule Take 1 capsule (75 mg total) by mouth 3 (three) times a day 270 capsule 1  varenicline (CHANTIX SAMMY) 0 5 MG X 11 & 1 MG X 42 tablet Take one 0 5mg tablet once daily for 3 days, then take one 0 5mg tablet twice daily for 3 days, then one 1mg tablet twice daily  53 tablet 0    varenicline (CHANTIX) 1 mg tablet Take 1 tablet (1 mg total) by mouth 2 (two) times a day 60 tablet 5    betamethasone valerate (VALISONE) 0 1 % cream Apply topically 2 (two) times a day 30 g 0     No current facility-administered medications for this visit  Objective:    /68   Pulse 100   Temp 97 6 °F (36 4 °C)   Resp 18   Ht 5' 2" (1 575 m)   Wt 78 9 kg (174 lb)   BMI 31 83 kg/m²        Physical Exam   Constitutional: She appears well-developed and well-nourished  Cardiovascular: Normal rate, regular rhythm and normal heart sounds  No murmur heard  Pulmonary/Chest: Effort normal and breath sounds normal    Neurological: She is alert  Skin: Skin is warm and dry  Erythematous, papular eruption bilateral forearms  No vesicles  Scratch marks evident   Psychiatric: She has a normal mood and affect  Nursing note and vitals reviewed               Katerin Martinez

## 2018-07-03 DIAGNOSIS — M79.7 FIBROMYALGIA: ICD-10-CM

## 2018-07-03 RX ORDER — PREGABALIN 75 MG/1
75 CAPSULE ORAL 3 TIMES DAILY
Qty: 270 CAPSULE | Refills: 1 | Status: SHIPPED | OUTPATIENT
Start: 2018-07-03 | End: 2021-01-01 | Stop reason: HOSPADM

## 2018-08-03 ENCOUNTER — OFFICE VISIT (OUTPATIENT)
Dept: FAMILY MEDICINE CLINIC | Facility: CLINIC | Age: 58
End: 2018-08-03
Payer: COMMERCIAL

## 2018-08-03 VITALS
RESPIRATION RATE: 18 BRPM | WEIGHT: 191 LBS | SYSTOLIC BLOOD PRESSURE: 140 MMHG | DIASTOLIC BLOOD PRESSURE: 70 MMHG | TEMPERATURE: 97.3 F | HEART RATE: 84 BPM | HEIGHT: 62 IN | BODY MASS INDEX: 35.15 KG/M2

## 2018-08-03 DIAGNOSIS — L23.7 CONTACT DERMATITIS DUE TO POISON IVY: Primary | ICD-10-CM

## 2018-08-03 PROCEDURE — 99213 OFFICE O/P EST LOW 20 MIN: CPT | Performed by: NURSE PRACTITIONER

## 2018-08-03 PROCEDURE — 3008F BODY MASS INDEX DOCD: CPT | Performed by: NURSE PRACTITIONER

## 2018-08-03 RX ORDER — ARIPIPRAZOLE 15 MG/1
TABLET ORAL
COMMUNITY
Start: 2018-07-02 | End: 2019-08-21

## 2018-08-03 RX ORDER — METHYLPREDNISOLONE ACETATE 80 MG/ML
80 INJECTION, SUSPENSION INTRA-ARTICULAR; INTRALESIONAL; INTRAMUSCULAR; SOFT TISSUE ONCE
Status: COMPLETED | OUTPATIENT
Start: 2018-08-03 | End: 2018-08-03

## 2018-08-03 RX ADMIN — METHYLPREDNISOLONE ACETATE 80 MG: 80 INJECTION, SUSPENSION INTRA-ARTICULAR; INTRALESIONAL; INTRAMUSCULAR; SOFT TISSUE at 16:41

## 2018-08-03 NOTE — PATIENT INSTRUCTIONS
Supportive care discussed and advised  Follow up for no improvement and worsening of conditions  Patient advised and educated when to see immediate medical care  Poison Ivy   WHAT YOU NEED TO KNOW:   Poison ivy is a plant that can cause an itchy, uncomfortable rash on your skin  Poison ivy grows as a shrub or vine in woods, fields, and areas of thick [place]  It has 3 bright green leaves on each stem that turn red in ave  DISCHARGE INSTRUCTIONS:   Medicines:   · Antiseptic or drying creams or ointments: These medicines may be used to dry out the rash and decrease the itching  These products may be available without a doctor's order  · Steroids: This medicine helps decrease itching and inflammation  It can be given as a cream to apply to your skin or as a pill  · Antihistamines: This medicine may help decrease itching and help you sleep  It is available without a doctor's order  · Take your medicine as directed  Contact your healthcare provider if you think your medicine is not helping or if you have side effects  Tell him of her if you are allergic to any medicine  Keep a list of the medicines, vitamins, and herbs you take  Include the amounts, and when and why you take them  Bring the list or the pill bottles to follow-up visits  Carry your medicine list with you in case of an emergency  Follow up with your healthcare provider as directed:  Write down your questions so you remember to ask them during your visits  How your poison ivy rash spreads: You cannot spread poison ivy by touching your rash or the liquid from your blisters  Poison ivy is spread only if you scratch your skin while it still has oil on it  You may think your rash is spreading because new rashes appear over a number of days  This happens because areas covered by thin skin break out in a rash first  Your face or forearms may develop a rash before thicker areas, such as the palms of your hands     Self-care:   · Keep your rash clean and dry:  Wash it with soap and water  Gently pat it dry with a clean towel  · Try not to scratch or rub your rash: This can cause your skin to become infected  · Use a compress on your rash:  Dip a clean washcloth in cool water  Wring it out and place it on your rash  Leave the washcloth on your skin for 15 minutes  Do this at least 3 times per day  · Take a cornstarch or oatmeal bath: If your rash is too large to cover with wet washcloths, take 3 or 4 cornstarch baths daily  Mix 1 pound of cornstarch with a little water to make a paste  Add the paste to a tub full of water and mix well  You may also use colloidal oatmeal in the bath water  Use lukewarm water  Avoid hot water because it may cause your itching to increase  Prevent a poison ivy rash in the future:   · Wear skin protection:  Wear long pants, a long-sleeved shirt, and gloves  Use a skin block lotion to protect your skin from poison ivy oil  You can find this at a drugstore without a prescription  · Wash clothing after possible exposure: If you think you have been near a poison ivy plant, wash the clothes you were wearing separately from other clothes  Rinse the washing machine well after you take the clothes out  Scrub boots and shoes with warm, soapy water  Dry clean items and clothing that you cannot wash in water  Poison ivy oil is sticky and can stay on surfaces for a long time  It can cause a new rash even years later  · Bathe your pet:  Use warm water and shampoo on your pet's fur  This will prevent the spread of oil to your skin, car, and home  Wear long sleeves, long pants, and gloves while washing pets or any items that may have oil on them  · Reduce exposure to poison ivy:  Do not touch plants that look like poison ivy  Keep your yard free of poison ivy  While protecting your skin, remove the plant and the roots  Place them in a plastic bag and seal the bag tightly       · Do not burn poison ivy plants: This can spread the oil through the air  If you breathe the oil into your lungs, you could have swelling and serious breathing problems  Oil that clings to the fire ammy can land on your skin and cause a rash  Contact your healthcare provider if:   · You have pus, soft yellow scabs, or tenderness on the rash  · The itching gets worse or keeps you awake at night  · The rash covers more than 1/4 of your skin or spreads to your eyes, mouth, or genital area  · The rash is not better after 2 to 3 weeks  · You have tender, swollen glands on the sides of your neck  · You have swelling in your arms and legs  · You have questions or concerns about your condition or care  Return to the emergency department if:   · You have a fever  · You have redness, swelling, and tenderness around the rash  · You have trouble breathing  © 2017 [employer]  Information is for End User's use only and may not be sold, redistributed or otherwise used for commercial purposes  All illustrations and images included in CareNotes® are the copyrighted property of A D A M , Inc  or Juan Luis Cordon  The above information is an  only  It is not intended as medical advice for individual conditions or treatments  Talk to your doctor, nurse or pharmacist before following any medical regimen to see if it is safe and effective for you

## 2018-08-03 NOTE — PROGRESS NOTES
Assessment/Plan:  Supportive care discussed and advised  Follow up for no improvement and worsening of conditions  Patient advised and educated when to see immediate medical care  Diagnoses and all orders for this visit:    Contact dermatitis due to poison ivy  -     methylPREDNISolone acetate (DEPO-MEDROL) injection 80 mg; Inject 1 mL (80 mg total) into a muscle once     BMI 34 0-34 9,adult    Other orders  -     ARIPiprazole (ABILIFY) 15 mg tablet;           Return if symptoms worsen or fail to improve  Subjective:      Patient ID: Ginger Martinez is a 62 y o  female  Chief Complaint   Patient presents with   [facility]     arms and legs for 4 days prcma       HPI   Patient stated that was working in the yard and started with rash about 4 days ago and very itchy and rash started on arms and now progressed to legs, and neck  Taking benadryl and applying cream but no relief  Denies sob, chest pain and wheezing  The following portions of the patient's history were reviewed and updated as appropriate: allergies, current medications, past family history, past medical history, past social history, past surgical history and problem list       Review of Systems   Constitutional: Negative  Respiratory: Negative  Cardiovascular: Negative  Skin: Positive for rash  Psychiatric/Behavioral: Negative  Objective:    History   Smoking Status    Current Every Day Smoker    Packs/day: 1 00    Years: 40 00    Types: Cigarettes   Smokeless Tobacco    Never Used       Allergies:    Allergies   Allergen Reactions    Vioxx [Rofecoxib] Rash       Vitals:  /70   Pulse 84   Temp (!) 97 3 °F (36 3 °C)   Resp 18   Ht 5' 2" (1 575 m)   Wt 86 6 kg (191 lb)   BMI 34 93 kg/m²     Current Outpatient Prescriptions   Medication Sig Dispense Refill    ARIPiprazole (ABILIFY) 15 mg tablet       baclofen 10 mg tablet Take 10 mg by mouth 2 (two) times a day  0    betamethasone valerate (VALISONE) 0 1 % cream Apply topically 2 (two) times a day 30 g 0    clonazePAM (KlonoPIN) 0 5 mg tablet Take 0 5 mg by mouth 2 (two) times a day   fentaNYL (DURAGESIC) 50 mcg/hr Place 1 patch on the skin every third day Max Daily Amount: 1 patch 10 patch 0    oxyCODONE (OXY-IR) 5 MG capsule Take 1 capsule (5 mg total) by mouth every 4 (four) hours as needed for moderate pain Max Daily Amount: 30 mg 90 capsule 0    pantoprazole (PROTONIX) 40 mg tablet Take 40 mg by mouth daily   pregabalin (LYRICA) 75 mg capsule Take 1 capsule (75 mg total) by mouth 3 (three) times a day 270 capsule 1     No current facility-administered medications for this visit  Physical Exam   Constitutional: She is oriented to person, place, and time  She appears well-developed and well-nourished  Cardiovascular: Normal rate, regular rhythm and normal heart sounds  Pulmonary/Chest: Effort normal and breath sounds normal    Neurological: She is alert and oriented to person, place, and time  Skin: Skin is warm and dry  Rash noted  Scattered erythematous pruritic papular eruption on lower legs, arms and neck  Psychiatric: She has a normal mood and affect   Her behavior is normal  Judgment and thought content normal          JONO Damon

## 2018-09-07 ENCOUNTER — OFFICE VISIT (OUTPATIENT)
Dept: FAMILY MEDICINE CLINIC | Facility: CLINIC | Age: 58
End: 2018-09-07
Payer: COMMERCIAL

## 2018-09-07 VITALS
HEART RATE: 80 BPM | TEMPERATURE: 97.5 F | SYSTOLIC BLOOD PRESSURE: 122 MMHG | DIASTOLIC BLOOD PRESSURE: 72 MMHG | BODY MASS INDEX: 34.04 KG/M2 | WEIGHT: 185 LBS | RESPIRATION RATE: 16 BRPM | HEIGHT: 62 IN

## 2018-09-07 DIAGNOSIS — E50.9 VITAMIN A DEFICIENCY: Primary | ICD-10-CM

## 2018-09-07 DIAGNOSIS — E55.9 VITAMIN D DEFICIENCY: ICD-10-CM

## 2018-09-07 DIAGNOSIS — Z13.6 SCREENING FOR CARDIOVASCULAR CONDITION: ICD-10-CM

## 2018-09-07 DIAGNOSIS — Z79.899 ENCOUNTER FOR LONG-TERM CURRENT USE OF MEDICATION: ICD-10-CM

## 2018-09-07 DIAGNOSIS — Z23 NEED FOR VACCINATION: ICD-10-CM

## 2018-09-07 PROCEDURE — 99213 OFFICE O/P EST LOW 20 MIN: CPT | Performed by: FAMILY MEDICINE

## 2018-09-07 NOTE — PROGRESS NOTES
Assessment/Plan:    Problem List Items Addressed This Visit     Vitamin A deficiency - Primary    Relevant Orders    Vitamin A    Vitamin D deficiency    Relevant Orders    Vitamin D 25 hydroxy      Other Visit Diagnoses     Screening for cardiovascular condition        Relevant Orders    Comprehensive metabolic panel    Lipid Panel with Direct LDL reflex    Encounter for long-term current use of medication        Relevant Orders    CBC    Need for vaccination        Relevant Orders    influenza vaccine, 1964-4129, quadrivalent, recombinant, PF, 0 5 mL, for patients 18 yr+ (FLUBLOK)       Has been taking her supplements  Will check levels  Form for handicap placard completed  There are no Patient Instructions on file for this visit  Return in about 6 months (around 3/7/2019) for Annual Wellness Visit  Subjective:      Patient ID: Nir Angeles is a 62 y o  female  Chief Complaint   Patient presents with    Follow-up     3 month follow up yamil       She was at [facility] and was in a wheelchair  She is getting shots  She is going to pain management  She is taking her vitamins  She is feeling well  The following portions of the patient's history were reviewed and updated as appropriate:  past social history    Review of Systems   Respiratory: Negative  Cardiovascular: Negative  Musculoskeletal: Positive for back pain  Current Outpatient Prescriptions   Medication Sig Dispense Refill    ARIPiprazole (ABILIFY) 15 mg tablet       baclofen 10 mg tablet Take 10 mg by mouth 2 (two) times a day  0    betamethasone valerate (VALISONE) 0 1 % cream Apply topically 2 (two) times a day (Patient taking differently: Apply topically daily  ) 30 g 0    clonazePAM (KlonoPIN) 0 5 mg tablet Take 0 5 mg by mouth 2 (two) times a day        fentaNYL (DURAGESIC) 50 mcg/hr Place 1 patch on the skin every third day Max Daily Amount: 1 patch 10 patch 0    oxyCODONE (OXY-IR) 5 MG capsule Take 1 capsule (5 mg total) by mouth every 4 (four) hours as needed for moderate pain Max Daily Amount: 30 mg 90 capsule 0    pregabalin (LYRICA) 75 mg capsule Take 1 capsule (75 mg total) by mouth 3 (three) times a day 270 capsule 1    pantoprazole (PROTONIX) 40 mg tablet Take 40 mg by mouth daily  No current facility-administered medications for this visit  Objective:    /72   Pulse 80   Temp 97 5 °F (36 4 °C)   Resp 16   Ht 5' 2" (1 575 m)   Wt 83 9 kg (185 lb)   BMI 33 84 kg/m²        Physical Exam   Constitutional: She appears well-developed and well-nourished  HENT:   Head: Normocephalic and atraumatic  Right Ear: External ear normal    Left Ear: External ear normal    Mouth/Throat: Oropharynx is clear and moist    Cardiovascular: Normal rate, regular rhythm and normal heart sounds  Exam reveals no friction rub  No murmur heard  Pulmonary/Chest: Effort normal and breath sounds normal  No respiratory distress  She has no wheezes  She has no rales  Musculoskeletal: She exhibits no edema or deformity  Slow gait   Nursing note and vitals reviewed               Marvin Masterson DO

## 2018-09-10 PROCEDURE — 90682 RIV4 VACC RECOMBINANT DNA IM: CPT

## 2019-03-18 ENCOUNTER — TELEPHONE (OUTPATIENT)
Dept: FAMILY MEDICINE CLINIC | Facility: CLINIC | Age: 59
End: 2019-03-18

## 2019-03-18 ENCOUNTER — OFFICE VISIT (OUTPATIENT)
Dept: FAMILY MEDICINE CLINIC | Facility: CLINIC | Age: 59
End: 2019-03-18
Payer: COMMERCIAL

## 2019-03-18 VITALS
RESPIRATION RATE: 18 BRPM | HEART RATE: 86 BPM | TEMPERATURE: 97.6 F | DIASTOLIC BLOOD PRESSURE: 74 MMHG | WEIGHT: 192 LBS | BODY MASS INDEX: 35.33 KG/M2 | SYSTOLIC BLOOD PRESSURE: 124 MMHG | HEIGHT: 62 IN

## 2019-03-18 DIAGNOSIS — F11.90 OPIATE USE: Chronic | ICD-10-CM

## 2019-03-18 DIAGNOSIS — M79.7 FIBROMYALGIA: ICD-10-CM

## 2019-03-18 DIAGNOSIS — N39.0 ACUTE UTI: Primary | ICD-10-CM

## 2019-03-18 DIAGNOSIS — F32.A DEPRESSION, UNSPECIFIED DEPRESSION TYPE: ICD-10-CM

## 2019-03-18 LAB
SL AMB  POCT GLUCOSE, UA: NORMAL
SL AMB LEUKOCYTE ESTERASE,UA: ABNORMAL
SL AMB POCT BILIRUBIN,UA: ABNORMAL
SL AMB POCT BLOOD,UA: 250
SL AMB POCT CLARITY,UA: ABNORMAL
SL AMB POCT COLOR,UA: YELLOW
SL AMB POCT KETONES,UA: ABNORMAL
SL AMB POCT NITRITE,UA: ABNORMAL
SL AMB POCT PH,UA: 7
SL AMB POCT SPECIFIC GRAVITY,UA: 1
SL AMB POCT URINE PROTEIN: ABNORMAL
SL AMB POCT UROBILINOGEN: NORMAL

## 2019-03-18 PROCEDURE — 99214 OFFICE O/P EST MOD 30 MIN: CPT | Performed by: NURSE PRACTITIONER

## 2019-03-18 PROCEDURE — 81003 URINALYSIS AUTO W/O SCOPE: CPT | Performed by: NURSE PRACTITIONER

## 2019-03-18 RX ORDER — BUPROPION HYDROCHLORIDE 300 MG/1
TABLET ORAL DAILY
COMMUNITY
Start: 2018-12-20 | End: 2021-01-01 | Stop reason: SDUPTHER

## 2019-03-18 RX ORDER — RIVAROXABAN 10 MG/1
10 TABLET, FILM COATED ORAL DAILY
Refills: 0 | COMMUNITY
Start: 2019-01-23 | End: 2019-03-18 | Stop reason: ALTCHOICE

## 2019-03-18 RX ORDER — SULFAMETHOXAZOLE AND TRIMETHOPRIM 800; 160 MG/1; MG/1
1 TABLET ORAL EVERY 12 HOURS SCHEDULED
Qty: 14 TABLET | Refills: 0 | Status: SHIPPED | OUTPATIENT
Start: 2019-03-18 | End: 2019-03-25

## 2019-03-18 NOTE — PROGRESS NOTES
Assessment/Plan:         Diagnoses and all orders for this visit:    Acute UTI  -     POCT urine dip auto non-scope  -     sulfamethoxazole-trimethoprim (BACTRIM DS) 800-160 mg per tablet; Take 1 tablet by mouth every 12 (twelve) hours for 7 days  -     Urine culture    Depression, unspecified depression type  Comments:  Managed by psychatrist      Fibromyalgia  Comments:  stable with current regimen    Opiate use  Comments:  Managed by pain management  Other orders  -     buPROPion (WELLBUTRIN XL) 300 mg 24 hr tablet; daily  -     Discontinue: XARELTO 10 MG tablet; Take 10 mg by mouth daily          BMI Counseling: Body mass index is 35 12 kg/m²  Discussed the patient's BMI with her  The BMI is above average  BMI counseling and education was provided to the patient  Nutrition recommendations include reducing portion sizes, decreasing overall calorie intake, 3-5 servings of fruits/vegetables daily, reducing fast food intake, consuming healthier snacks, decreasing soda and/or juice intake, moderation in carbohydrate intake, increasing intake of lean protein, reducing intake of saturated fat and trans fat and reducing intake of cholesterol  Patient Instructions: Take antibiotics until finished with food  Drink plenty of fluids  Follow up advised for persistent urinary symptoms or if you develop flank pain, fevers, nausea, or vomiting  Please call the office if symptoms do not improve after completion of the antibiotics  Supportive care discussed and advised  Follow up for no improvement and worsening of conditions  Patient advised and educated when to see immediate medical care  Return if symptoms worsen or fail to improve        Recent Results (from the past 24 hour(s))   POCT urine dip auto non-scope    Collection Time: 03/18/19  2:22 PM   Result Value Ref Range     COLOR,UA yellow     CLARITY,UA cloudy     SPECIFIC GRAVITY,UA 1 000      PH,UA 7     LEUKOCYTE ESTERASE,UA ++     NITRITE,UA neg GLUCOSE, UA normal     KETONES,UA neg     BILIRUBIN,UA neg     BLOOD,     POCT URINE PROTEIN trace     SL AMB POCT UROBILINOGEN normal          Subjective:      Patient ID: Raisa Stauffer is a 61 y o  female  Chief Complaint   Patient presents with    Urinary Tract Infection     [id number]       HPI  Patient stated that started with burning with urination and urgency yesterday  Stated that feels like not able to empty her bladder  Denies any abdominal pain, n/v/d  Compliant with medications  Under care of psychiatrist   Saman Garcia right knee replacement in jan 2019  Vitals:  /74   Pulse 86   Temp 97 6 °F (36 4 °C)   Resp 18   Ht 5' 2" (1 575 m)   Wt 87 1 kg (192 lb)   BMI 35 12 kg/m²     The following portions of the patient's history were reviewed and updated as appropriate: allergies, current medications, past family history, past medical history, past social history, past surgical history and problem list       Review of Systems   Constitutional: Negative for chills, fatigue, fever and unexpected weight change  Respiratory: Negative  Cardiovascular: Negative  Gastrointestinal: Negative for abdominal pain, nausea and vomiting  Genitourinary: Positive for dysuria and urgency  Negative for decreased urine volume, difficulty urinating, flank pain, frequency, genital sores and hematuria  Skin: Negative  Neurological: Negative for dizziness and headaches  Psychiatric/Behavioral: Negative  Objective:    Social History     Tobacco Use   Smoking Status Current Every Day Smoker    Packs/day: 1 00    Years: 40 00    Pack years: 40 00    Types: Cigarettes   Smokeless Tobacco Never Used       Allergies:    Allergies   Allergen Reactions    Vioxx [Rofecoxib] Rash         Current Outpatient Medications   Medication Sig Dispense Refill    ARIPiprazole (ABILIFY) 15 mg tablet       baclofen 10 mg tablet Take 10 mg by mouth 2 (two) times a day  0    buPROPion (WELLBUTRIN XL) 300 mg 24 hr tablet daily      clonazePAM (KlonoPIN) 0 5 mg tablet Take 0 5 mg by mouth 2 (two) times a day   fentaNYL (DURAGESIC) 50 mcg/hr Place 1 patch on the skin every third day Max Daily Amount: 1 patch 10 patch 0    oxyCODONE (OXY-IR) 5 MG capsule Take 1 capsule (5 mg total) by mouth every 4 (four) hours as needed for moderate pain Max Daily Amount: 30 mg 90 capsule 0    pantoprazole (PROTONIX) 40 mg tablet Take 40 mg by mouth daily   pregabalin (LYRICA) 75 mg capsule Take 1 capsule (75 mg total) by mouth 3 (three) times a day 270 capsule 1    sulfamethoxazole-trimethoprim (BACTRIM DS) 800-160 mg per tablet Take 1 tablet by mouth every 12 (twelve) hours for 7 days 14 tablet 0     No current facility-administered medications for this visit  Physical Exam   Constitutional: She is oriented to person, place, and time  She appears well-developed and well-nourished  HENT:   Head: Normocephalic  Right Ear: External ear normal    Left Ear: External ear normal    Mouth/Throat: Oropharynx is clear and moist    Eyes: Conjunctivae are normal    Cardiovascular: Normal rate, regular rhythm and normal heart sounds  Pulmonary/Chest: Effort normal and breath sounds normal    Abdominal: Soft  Bowel sounds are normal  There is no tenderness  There is no CVA tenderness  Neurological: She is alert and oriented to person, place, and time  Psychiatric: She has a normal mood and affect  Her behavior is normal  Judgment and thought content normal    Vitals reviewed                    JONO Farmer

## 2019-03-18 NOTE — PATIENT INSTRUCTIONS
Take antibiotics until finished with food  Drink plenty of fluids  Follow up advised for persistent urinary symptoms or if you develop flank pain, fevers, nausea, or vomiting  Please call the office if symptoms do not improve after completion of the antibiotics  Supportive care discussed and advised  Follow up for no improvement and worsening of conditions  Patient advised and educated when to see immediate medical care

## 2019-03-18 NOTE — TELEPHONE ENCOUNTER
DR HAYWARD   Patient would like you to call in medication for a uti, so she doesn't have to come in  Patient was very hard to understand  She couldn't pronounce her words

## 2019-03-21 LAB
BACTERIA UR CULT: ABNORMAL
Lab: ABNORMAL
SL AMB ANTIMICROBIAL SUSCEPTIBILITY: ABNORMAL

## 2019-04-01 ENCOUNTER — OFFICE VISIT (OUTPATIENT)
Dept: FAMILY MEDICINE CLINIC | Facility: CLINIC | Age: 59
End: 2019-04-01
Payer: COMMERCIAL

## 2019-04-01 VITALS
DIASTOLIC BLOOD PRESSURE: 76 MMHG | WEIGHT: 199 LBS | HEART RATE: 84 BPM | HEIGHT: 62 IN | TEMPERATURE: 97.2 F | RESPIRATION RATE: 18 BRPM | BODY MASS INDEX: 36.62 KG/M2 | SYSTOLIC BLOOD PRESSURE: 130 MMHG

## 2019-04-01 DIAGNOSIS — Z00.00 MEDICARE ANNUAL WELLNESS VISIT, SUBSEQUENT: Primary | ICD-10-CM

## 2019-04-01 DIAGNOSIS — L03.115 BILATERAL CELLULITIS OF LOWER LEG: ICD-10-CM

## 2019-04-01 DIAGNOSIS — E55.9 VITAMIN D DEFICIENCY: ICD-10-CM

## 2019-04-01 DIAGNOSIS — E50.9 VITAMIN A DEFICIENCY: ICD-10-CM

## 2019-04-01 DIAGNOSIS — Z98.84 BARIATRIC SURGERY STATUS: ICD-10-CM

## 2019-04-01 DIAGNOSIS — L03.116 BILATERAL CELLULITIS OF LOWER LEG: ICD-10-CM

## 2019-04-01 DIAGNOSIS — Z79.899 ENCOUNTER FOR LONG-TERM CURRENT USE OF MEDICATION: ICD-10-CM

## 2019-04-01 DIAGNOSIS — Z12.31 SCREENING MAMMOGRAM, ENCOUNTER FOR: ICD-10-CM

## 2019-04-01 DIAGNOSIS — B00.9 HERPES SIMPLEX INFECTION: ICD-10-CM

## 2019-04-01 DIAGNOSIS — Z72.0 TOBACCO USE: ICD-10-CM

## 2019-04-01 DIAGNOSIS — Z13.6 SCREENING FOR CARDIOVASCULAR CONDITION: ICD-10-CM

## 2019-04-01 PROCEDURE — G0439 PPPS, SUBSEQ VISIT: HCPCS | Performed by: FAMILY MEDICINE

## 2019-04-01 RX ORDER — CEPHALEXIN 500 MG/1
500 CAPSULE ORAL EVERY 12 HOURS SCHEDULED
Qty: 14 CAPSULE | Refills: 0 | Status: SHIPPED | OUTPATIENT
Start: 2019-04-01 | End: 2019-04-08 | Stop reason: SDUPTHER

## 2019-04-01 RX ORDER — VARENICLINE TARTRATE 25 MG
KIT ORAL
Qty: 53 TABLET | Refills: 0 | Status: SHIPPED | OUTPATIENT
Start: 2019-04-01 | End: 2019-05-17 | Stop reason: ALTCHOICE

## 2019-04-01 RX ORDER — VARENICLINE TARTRATE 1 MG/1
1 TABLET, FILM COATED ORAL 2 TIMES DAILY
Qty: 60 TABLET | Refills: 5 | Status: SHIPPED | OUTPATIENT
Start: 2019-04-01 | End: 2019-05-17 | Stop reason: ALTCHOICE

## 2019-04-01 RX ORDER — ACYCLOVIR 50 MG/G
OINTMENT TOPICAL
Qty: 30 G | Refills: 0 | Status: SHIPPED | OUTPATIENT
Start: 2019-04-01 | End: 2019-06-18 | Stop reason: ALTCHOICE

## 2019-04-05 ENCOUNTER — TELEPHONE (OUTPATIENT)
Dept: FAMILY MEDICINE CLINIC | Facility: CLINIC | Age: 59
End: 2019-04-05

## 2019-04-05 ENCOUNTER — HOSPITAL ENCOUNTER (OUTPATIENT)
Dept: RADIOLOGY | Facility: HOSPITAL | Age: 59
Discharge: HOME | End: 2019-04-05
Payer: COMMERCIAL

## 2019-04-05 ENCOUNTER — OFFICE VISIT (OUTPATIENT)
Dept: FAMILY MEDICINE CLINIC | Facility: CLINIC | Age: 59
End: 2019-04-05
Payer: COMMERCIAL

## 2019-04-05 VITALS
RESPIRATION RATE: 18 BRPM | HEIGHT: 62 IN | BODY MASS INDEX: 36.8 KG/M2 | DIASTOLIC BLOOD PRESSURE: 78 MMHG | HEART RATE: 82 BPM | SYSTOLIC BLOOD PRESSURE: 130 MMHG | WEIGHT: 200 LBS | TEMPERATURE: 97.6 F

## 2019-04-05 DIAGNOSIS — R63.5 ABNORMAL WEIGHT GAIN: ICD-10-CM

## 2019-04-05 DIAGNOSIS — M79.89 PAIN AND SWELLING OF LOWER LEG, LEFT: ICD-10-CM

## 2019-04-05 DIAGNOSIS — R60.0 BILATERAL LOWER EXTREMITY EDEMA: ICD-10-CM

## 2019-04-05 DIAGNOSIS — M79.662 PAIN AND SWELLING OF LOWER LEG, LEFT: ICD-10-CM

## 2019-04-05 DIAGNOSIS — L03.116 CELLULITIS OF LEFT LOWER LEG: Primary | ICD-10-CM

## 2019-04-05 PROCEDURE — 99214 OFFICE O/P EST MOD 30 MIN: CPT | Performed by: NURSE PRACTITIONER

## 2019-04-05 PROCEDURE — 3725F SCREEN DEPRESSION PERFORMED: CPT | Performed by: NURSE PRACTITIONER

## 2019-04-05 PROCEDURE — 93971 EXTREMITY STUDY: CPT

## 2019-04-05 PROCEDURE — 93971 EXTREMITY STUDY: CPT | Performed by: SURGERY

## 2019-04-05 RX ORDER — FUROSEMIDE 20 MG/1
20 TABLET ORAL DAILY
Qty: 5 TABLET | Refills: 0 | Status: SHIPPED | OUTPATIENT
Start: 2019-04-05 | End: 2019-04-08 | Stop reason: SDUPTHER

## 2019-04-08 ENCOUNTER — OFFICE VISIT (OUTPATIENT)
Dept: FAMILY MEDICINE CLINIC | Facility: CLINIC | Age: 59
End: 2019-04-08
Payer: COMMERCIAL

## 2019-04-08 VITALS
BODY MASS INDEX: 36.07 KG/M2 | RESPIRATION RATE: 16 BRPM | WEIGHT: 196 LBS | SYSTOLIC BLOOD PRESSURE: 112 MMHG | TEMPERATURE: 97.3 F | HEIGHT: 62 IN | HEART RATE: 72 BPM | DIASTOLIC BLOOD PRESSURE: 78 MMHG

## 2019-04-08 DIAGNOSIS — L03.116 BILATERAL CELLULITIS OF LOWER LEG: ICD-10-CM

## 2019-04-08 DIAGNOSIS — L03.115 BILATERAL CELLULITIS OF LOWER LEG: ICD-10-CM

## 2019-04-08 DIAGNOSIS — R60.0 BILATERAL LOWER EXTREMITY EDEMA: ICD-10-CM

## 2019-04-08 PROCEDURE — 3008F BODY MASS INDEX DOCD: CPT | Performed by: FAMILY MEDICINE

## 2019-04-08 PROCEDURE — 4004F PT TOBACCO SCREEN RCVD TLK: CPT | Performed by: FAMILY MEDICINE

## 2019-04-08 PROCEDURE — 99213 OFFICE O/P EST LOW 20 MIN: CPT | Performed by: FAMILY MEDICINE

## 2019-04-08 RX ORDER — POTASSIUM CHLORIDE 750 MG/1
TABLET, FILM COATED, EXTENDED RELEASE ORAL
Qty: 30 TABLET | Refills: 1 | Status: SHIPPED | OUTPATIENT
Start: 2019-04-08 | End: 2019-06-18 | Stop reason: ALTCHOICE

## 2019-04-08 RX ORDER — CEPHALEXIN 500 MG/1
500 CAPSULE ORAL EVERY 12 HOURS SCHEDULED
Qty: 14 CAPSULE | Refills: 0 | Status: SHIPPED | OUTPATIENT
Start: 2019-04-08 | End: 2019-04-15

## 2019-04-08 RX ORDER — FUROSEMIDE 20 MG/1
20 TABLET ORAL DAILY PRN
Qty: 30 TABLET | Refills: 1 | Status: SHIPPED | OUTPATIENT
Start: 2019-04-08 | End: 2019-06-18 | Stop reason: ALTCHOICE

## 2019-04-24 ENCOUNTER — HOSPITAL ENCOUNTER (OUTPATIENT)
Dept: RADIOLOGY | Facility: HOSPITAL | Age: 59
Discharge: HOME | End: 2019-04-24
Payer: COMMERCIAL

## 2019-04-24 ENCOUNTER — HOSPITAL ENCOUNTER (OUTPATIENT)
Dept: NON INVASIVE DIAGNOSTICS | Facility: HOSPITAL | Age: 59
Discharge: HOME | End: 2019-04-24
Payer: COMMERCIAL

## 2019-04-24 VITALS — BODY MASS INDEX: 36.07 KG/M2 | WEIGHT: 196 LBS | HEIGHT: 62 IN

## 2019-04-24 DIAGNOSIS — Z12.31 SCREENING MAMMOGRAM, ENCOUNTER FOR: ICD-10-CM

## 2019-04-24 DIAGNOSIS — R60.0 BILATERAL LOWER EXTREMITY EDEMA: ICD-10-CM

## 2019-04-24 DIAGNOSIS — R63.5 ABNORMAL WEIGHT GAIN: ICD-10-CM

## 2019-04-24 PROCEDURE — 77067 SCR MAMMO BI INCL CAD: CPT

## 2019-04-24 PROCEDURE — 77063 BREAST TOMOSYNTHESIS BI: CPT

## 2019-04-24 PROCEDURE — 93306 TTE W/DOPPLER COMPLETE: CPT

## 2019-04-25 PROCEDURE — 93306 TTE W/DOPPLER COMPLETE: CPT | Performed by: INTERNAL MEDICINE

## 2019-05-17 ENCOUNTER — OFFICE VISIT (OUTPATIENT)
Dept: FAMILY MEDICINE CLINIC | Facility: CLINIC | Age: 59
End: 2019-05-17
Payer: COMMERCIAL

## 2019-05-17 VITALS
HEART RATE: 82 BPM | WEIGHT: 197 LBS | BODY MASS INDEX: 36.25 KG/M2 | SYSTOLIC BLOOD PRESSURE: 130 MMHG | TEMPERATURE: 97.3 F | RESPIRATION RATE: 18 BRPM | DIASTOLIC BLOOD PRESSURE: 76 MMHG | HEIGHT: 62 IN

## 2019-05-17 DIAGNOSIS — J02.9 EXUDATIVE PHARYNGITIS: Primary | ICD-10-CM

## 2019-05-17 PROBLEM — L25.5 DERMATITIS DUE TO PLANTS, INCLUDING POISON IVY, SUMAC, AND OAK: Status: RESOLVED | Noted: 2018-06-02 | Resolved: 2019-05-17

## 2019-05-17 LAB — S PYO AG THROAT QL: NEGATIVE

## 2019-05-17 PROCEDURE — 99213 OFFICE O/P EST LOW 20 MIN: CPT | Performed by: NURSE PRACTITIONER

## 2019-05-17 PROCEDURE — 87880 STREP A ASSAY W/OPTIC: CPT | Performed by: NURSE PRACTITIONER

## 2019-05-17 RX ORDER — AMOXICILLIN AND CLAVULANATE POTASSIUM 875; 125 MG/1; MG/1
1 TABLET, FILM COATED ORAL EVERY 12 HOURS SCHEDULED
Qty: 20 TABLET | Refills: 0 | Status: SHIPPED | OUTPATIENT
Start: 2019-05-17 | End: 2019-05-27

## 2019-06-18 ENCOUNTER — OFFICE VISIT (OUTPATIENT)
Dept: FAMILY MEDICINE CLINIC | Facility: CLINIC | Age: 59
End: 2019-06-18
Payer: COMMERCIAL

## 2019-06-18 VITALS
DIASTOLIC BLOOD PRESSURE: 80 MMHG | SYSTOLIC BLOOD PRESSURE: 130 MMHG | TEMPERATURE: 98 F | WEIGHT: 198 LBS | HEART RATE: 80 BPM | RESPIRATION RATE: 16 BRPM | HEIGHT: 62 IN | BODY MASS INDEX: 36.44 KG/M2

## 2019-06-18 DIAGNOSIS — L25.5 CONTACT DERMATITIS DUE TO PLANT: Primary | ICD-10-CM

## 2019-06-18 PROCEDURE — 3008F BODY MASS INDEX DOCD: CPT | Performed by: FAMILY MEDICINE

## 2019-06-18 PROCEDURE — 99213 OFFICE O/P EST LOW 20 MIN: CPT | Performed by: FAMILY MEDICINE

## 2019-06-18 PROCEDURE — 4004F PT TOBACCO SCREEN RCVD TLK: CPT | Performed by: FAMILY MEDICINE

## 2019-06-18 RX ORDER — PREDNISONE 10 MG/1
TABLET ORAL
Qty: 40 TABLET | Refills: 0 | Status: SHIPPED | OUTPATIENT
Start: 2019-06-18 | End: 2019-07-04

## 2019-07-26 ENCOUNTER — TRANSCRIBE ORDERS (OUTPATIENT)
Dept: ADMINISTRATIVE | Facility: HOSPITAL | Age: 59
End: 2019-07-26

## 2019-07-26 ENCOUNTER — APPOINTMENT (OUTPATIENT)
Dept: LAB | Facility: HOSPITAL | Age: 59
End: 2019-07-26
Payer: COMMERCIAL

## 2019-07-26 DIAGNOSIS — M79.671 RIGHT FOOT PAIN: ICD-10-CM

## 2019-07-26 DIAGNOSIS — M79.671 RIGHT FOOT PAIN: Primary | ICD-10-CM

## 2019-07-26 DIAGNOSIS — M84.374S STRESS FRACTURE OF RIGHT FOOT, SEQUELA: ICD-10-CM

## 2019-07-26 LAB — 25(OH)D3 SERPL-MCNC: 12.9 NG/ML (ref 30–100)

## 2019-07-26 PROCEDURE — 82306 VITAMIN D 25 HYDROXY: CPT

## 2019-07-26 PROCEDURE — 36415 COLL VENOUS BLD VENIPUNCTURE: CPT

## 2019-08-19 ENCOUNTER — OFFICE VISIT (OUTPATIENT)
Dept: FAMILY MEDICINE CLINIC | Facility: CLINIC | Age: 59
End: 2019-08-19
Payer: COMMERCIAL

## 2019-08-19 VITALS
TEMPERATURE: 97.1 F | RESPIRATION RATE: 16 BRPM | SYSTOLIC BLOOD PRESSURE: 120 MMHG | HEIGHT: 62 IN | WEIGHT: 212 LBS | DIASTOLIC BLOOD PRESSURE: 70 MMHG | BODY MASS INDEX: 39.01 KG/M2 | HEART RATE: 88 BPM

## 2019-08-19 DIAGNOSIS — L03.119 CELLULITIS OF LOWER EXTREMITY, UNSPECIFIED LATERALITY: Primary | ICD-10-CM

## 2019-08-19 PROCEDURE — 99213 OFFICE O/P EST LOW 20 MIN: CPT | Performed by: INTERNAL MEDICINE
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
## 2019-08-19 NOTE — PATIENT INSTRUCTIONS
Continue with antibiotic  Elevate the legs  Supportive care discussed and advised  Advised to RTO for any worsening and no improvement  Follow up for no improvement and worsening of conditions  Patient advised and educated when to see immediate medical care

## 2019-08-20 ENCOUNTER — TELEPHONE (OUTPATIENT)
Dept: FAMILY MEDICINE CLINIC | Facility: CLINIC | Age: 59
End: 2019-08-20

## 2019-08-20 NOTE — TELEPHONE ENCOUNTER
Spoke with pt, stated will call [employer] and find out exactly what the prescription needs to say so we can FAX over  Pt will call back        Francisco Javier Shook MA

## 2019-08-20 NOTE — TELEPHONE ENCOUNTER
She left a message on the rx hotline requesting a new  rx (as per [employer]) for a "scooter/wheelchair" and asked for a call back  States that she currently has a motorized scooter that just  and needs a new rx  We need to call her back to find out exactly what she needs written    Isa Brumfield LPN

## 2019-08-21 ENCOUNTER — OFFICE VISIT (OUTPATIENT)
Dept: FAMILY MEDICINE CLINIC | Facility: CLINIC | Age: 59
End: 2019-08-21
Payer: COMMERCIAL

## 2019-08-21 ENCOUNTER — HOSPITAL ENCOUNTER (INPATIENT)
Facility: HOSPITAL | Age: 59
LOS: 2 days | Discharge: HOME | DRG: 603 | End: 2019-08-23
Attending: EMERGENCY MEDICINE | Admitting: INTERNAL MEDICINE
Payer: COMMERCIAL

## 2019-08-21 VITALS
SYSTOLIC BLOOD PRESSURE: 118 MMHG | BODY MASS INDEX: 39.01 KG/M2 | TEMPERATURE: 97.2 F | WEIGHT: 212 LBS | HEIGHT: 62 IN | DIASTOLIC BLOOD PRESSURE: 62 MMHG | HEART RATE: 68 BPM | RESPIRATION RATE: 16 BRPM

## 2019-08-21 DIAGNOSIS — D50.8 IRON DEFICIENCY ANEMIA SECONDARY TO INADEQUATE DIETARY IRON INTAKE: ICD-10-CM

## 2019-08-21 DIAGNOSIS — S81.802A WOUND OF LEFT LOWER EXTREMITY: ICD-10-CM

## 2019-08-21 DIAGNOSIS — L03.116 CELLULITIS OF LEFT LOWER EXTREMITY: ICD-10-CM

## 2019-08-21 DIAGNOSIS — R60.0 BILATERAL LOWER EXTREMITY EDEMA: Primary | ICD-10-CM

## 2019-08-21 DIAGNOSIS — L03.119 CELLULITIS OF LOWER EXTREMITY, UNSPECIFIED LATERALITY: ICD-10-CM

## 2019-08-21 DIAGNOSIS — L03.90 CELLULITIS: Primary | ICD-10-CM

## 2019-08-21 DIAGNOSIS — F11.90 OPIATE USE: ICD-10-CM

## 2019-08-21 DIAGNOSIS — R60.9 PERIPHERAL EDEMA: ICD-10-CM

## 2019-08-21 LAB
ALBUMIN SERPL BCP-MCNC: 3 G/DL (ref 3.5–5)
ALP SERPL-CCNC: 110 U/L (ref 46–116)
ALT SERPL W P-5'-P-CCNC: 12 U/L (ref 12–78)
ANION GAP SERPL CALCULATED.3IONS-SCNC: 6 MMOL/L (ref 4–13)
AST SERPL W P-5'-P-CCNC: 16 U/L (ref 5–45)
BASOPHILS # BLD AUTO: 0.04 THOUSANDS/ΜL (ref 0–0.1)
BASOPHILS NFR BLD AUTO: 1 % (ref 0–1)
BILIRUB SERPL-MCNC: 0.2 MG/DL (ref 0.2–1)
BUN SERPL-MCNC: 6 MG/DL (ref 5–25)
CALCIUM SERPL-MCNC: 7.9 MG/DL (ref 8.3–10.1)
CHLORIDE SERPL-SCNC: 102 MMOL/L (ref 100–108)
CO2 SERPL-SCNC: 31 MMOL/L (ref 21–32)
CREAT SERPL-MCNC: 0.94 MG/DL (ref 0.6–1.3)
EOSINOPHIL # BLD AUTO: 0.11 THOUSAND/ΜL (ref 0–0.61)
EOSINOPHIL NFR BLD AUTO: 2 % (ref 0–6)
ERYTHROCYTE [DISTWIDTH] IN BLOOD BY AUTOMATED COUNT: 17.2 % (ref 11.6–15.1)
GFR SERPL CREATININE-BSD FRML MDRD: 67 ML/MIN/1.73SQ M
GLUCOSE SERPL-MCNC: 98 MG/DL (ref 65–140)
HCT VFR BLD AUTO: 37 % (ref 34.8–46.1)
HGB BLD-MCNC: 11.3 G/DL (ref 11.5–15.4)
IMM GRANULOCYTES # BLD AUTO: 0.04 THOUSAND/UL (ref 0–0.2)
IMM GRANULOCYTES NFR BLD AUTO: 1 % (ref 0–2)
LACTATE SERPL-SCNC: 1.1 MMOL/L (ref 0.5–2)
LYMPHOCYTES # BLD AUTO: 1.19 THOUSANDS/ΜL (ref 0.6–4.47)
LYMPHOCYTES NFR BLD AUTO: 16 % (ref 14–44)
MCH RBC QN AUTO: 27.7 PG (ref 26.8–34.3)
MCHC RBC AUTO-ENTMCNC: 30.5 G/DL (ref 31.4–37.4)
MCV RBC AUTO: 91 FL (ref 82–98)
MONOCYTES # BLD AUTO: 0.49 THOUSAND/ΜL (ref 0.17–1.22)
MONOCYTES NFR BLD AUTO: 7 % (ref 4–12)
NEUTROPHILS # BLD AUTO: 5.58 THOUSANDS/ΜL (ref 1.85–7.62)
NEUTS SEG NFR BLD AUTO: 73 % (ref 43–75)
NRBC BLD AUTO-RTO: 0 /100 WBCS
PLATELET # BLD AUTO: 329 THOUSANDS/UL (ref 149–390)
PMV BLD AUTO: 9.4 FL (ref 8.9–12.7)
POTASSIUM SERPL-SCNC: 3.3 MMOL/L (ref 3.5–5.3)
PROT SERPL-MCNC: 6.3 G/DL (ref 6.4–8.2)
RBC # BLD AUTO: 4.08 MILLION/UL (ref 3.81–5.12)
SODIUM SERPL-SCNC: 139 MMOL/L (ref 136–145)
WBC # BLD AUTO: 7.45 THOUSAND/UL (ref 4.31–10.16)

## 2019-08-21 PROCEDURE — 80053 COMPREHEN METABOLIC PANEL: CPT | Performed by: EMERGENCY MEDICINE

## 2019-08-21 PROCEDURE — 83605 ASSAY OF LACTIC ACID: CPT | Performed by: EMERGENCY MEDICINE

## 2019-08-21 PROCEDURE — 87040 BLOOD CULTURE FOR BACTERIA: CPT | Performed by: EMERGENCY MEDICINE

## 2019-08-21 PROCEDURE — 1111F DSCHRG MED/CURRENT MED MERGE: CPT | Performed by: FAMILY MEDICINE

## 2019-08-21 PROCEDURE — 96365 THER/PROPH/DIAG IV INF INIT: CPT

## 2019-08-21 PROCEDURE — 36415 COLL VENOUS BLD VENIPUNCTURE: CPT | Performed by: EMERGENCY MEDICINE

## 2019-08-21 PROCEDURE — 99223 1ST HOSP IP/OBS HIGH 75: CPT | Performed by: STUDENT IN AN ORGANIZED HEALTH CARE EDUCATION/TRAINING PROGRAM

## 2019-08-21 PROCEDURE — 87205 SMEAR GRAM STAIN: CPT | Performed by: FAMILY MEDICINE

## 2019-08-21 PROCEDURE — 99284 EMERGENCY DEPT VISIT MOD MDM: CPT

## 2019-08-21 PROCEDURE — 99214 OFFICE O/P EST MOD 30 MIN: CPT | Performed by: FAMILY MEDICINE

## 2019-08-21 PROCEDURE — 87070 CULTURE OTHR SPECIMN AEROBIC: CPT | Performed by: FAMILY MEDICINE

## 2019-08-21 PROCEDURE — 85025 COMPLETE CBC W/AUTO DIFF WBC: CPT | Performed by: EMERGENCY MEDICINE

## 2019-08-21 RX ORDER — SULFAMETHOXAZOLE AND TRIMETHOPRIM 800; 160 MG/1; MG/1
TABLET ORAL
COMMUNITY
Start: 2019-08-16 | End: 2019-08-21 | Stop reason: SDUPTHER

## 2019-08-21 RX ORDER — BACLOFEN 10 MG/1
10 TABLET ORAL 2 TIMES DAILY
Status: DISCONTINUED | OUTPATIENT
Start: 2019-08-21 | End: 2019-08-23 | Stop reason: HOSPADM

## 2019-08-21 RX ORDER — DIPHENHYDRAMINE HCL 25 MG
12.5 TABLET ORAL EVERY 6 HOURS PRN
Status: COMPLETED | OUTPATIENT
Start: 2019-08-21 | End: 2019-08-22

## 2019-08-21 RX ORDER — FENTANYL 50 UG/H
1 PATCH TRANSDERMAL
Status: DISCONTINUED | OUTPATIENT
Start: 2019-08-22 | End: 2019-08-23 | Stop reason: HOSPADM

## 2019-08-21 RX ORDER — CEPHALEXIN 500 MG/1
CAPSULE ORAL
COMMUNITY
Start: 2019-08-16 | End: 2019-08-21 | Stop reason: SDUPTHER

## 2019-08-21 RX ORDER — HYDROCODONE BITARTRATE AND ACETAMINOPHEN 5; 325 MG/1; MG/1
1 TABLET ORAL EVERY 6 HOURS PRN
Qty: 20 TABLET | Refills: 0 | Status: SHIPPED | OUTPATIENT
Start: 2019-08-21 | End: 2019-08-21

## 2019-08-21 RX ORDER — ACETAMINOPHEN 325 MG/1
650 TABLET ORAL EVERY 6 HOURS PRN
Status: DISCONTINUED | OUTPATIENT
Start: 2019-08-21 | End: 2019-08-23 | Stop reason: HOSPADM

## 2019-08-21 RX ORDER — ONDANSETRON 2 MG/ML
4 INJECTION INTRAMUSCULAR; INTRAVENOUS EVERY 6 HOURS PRN
Status: DISCONTINUED | OUTPATIENT
Start: 2019-08-21 | End: 2019-08-23 | Stop reason: HOSPADM

## 2019-08-21 RX ORDER — VANCOMYCIN HYDROCHLORIDE 1 G/200ML
1000 INJECTION, SOLUTION INTRAVENOUS ONCE
Status: COMPLETED | OUTPATIENT
Start: 2019-08-21 | End: 2019-08-21

## 2019-08-21 RX ORDER — CLONAZEPAM 1 MG/1
1 TABLET ORAL
Status: DISCONTINUED | OUTPATIENT
Start: 2019-08-22 | End: 2019-08-21

## 2019-08-21 RX ORDER — ARIPIPRAZOLE 5 MG/1
5 TABLET ORAL DAILY
Refills: 0 | COMMUNITY
Start: 2019-07-10 | End: 2021-01-01 | Stop reason: ALTCHOICE

## 2019-08-21 RX ORDER — PANTOPRAZOLE SODIUM 40 MG/1
40 TABLET, DELAYED RELEASE ORAL DAILY
Status: DISCONTINUED | OUTPATIENT
Start: 2019-08-22 | End: 2019-08-23 | Stop reason: HOSPADM

## 2019-08-21 RX ORDER — BUPROPION HYDROCHLORIDE 150 MG/1
300 TABLET ORAL DAILY
Status: DISCONTINUED | OUTPATIENT
Start: 2019-08-22 | End: 2019-08-23 | Stop reason: HOSPADM

## 2019-08-21 RX ORDER — PREGABALIN 75 MG/1
75 CAPSULE ORAL 3 TIMES DAILY
Status: DISCONTINUED | OUTPATIENT
Start: 2019-08-21 | End: 2019-08-23 | Stop reason: HOSPADM

## 2019-08-21 RX ORDER — CLONAZEPAM 0.5 MG/1
0.5 TABLET ORAL 2 TIMES DAILY
Status: DISCONTINUED | OUTPATIENT
Start: 2019-08-21 | End: 2019-08-21

## 2019-08-21 RX ORDER — VANCOMYCIN HYDROCHLORIDE 1 G/200ML
15 INJECTION, SOLUTION INTRAVENOUS EVERY 12 HOURS
Status: DISCONTINUED | OUTPATIENT
Start: 2019-08-22 | End: 2019-08-22

## 2019-08-21 RX ORDER — DIPHENHYDRAMINE HCL 25 MG
12.5 TABLET ORAL EVERY 6 HOURS PRN
Status: DISCONTINUED | OUTPATIENT
Start: 2019-08-21 | End: 2019-08-21

## 2019-08-21 RX ORDER — SERTRALINE HYDROCHLORIDE 100 MG/1
100 TABLET, FILM COATED ORAL DAILY
Status: DISCONTINUED | OUTPATIENT
Start: 2019-08-22 | End: 2019-08-23 | Stop reason: HOSPADM

## 2019-08-21 RX ORDER — ERYTHROMYCIN 5 MG/G
OINTMENT OPHTHALMIC
COMMUNITY
Start: 2019-08-07 | End: 2019-09-09 | Stop reason: ALTCHOICE

## 2019-08-21 RX ORDER — OXYCODONE HYDROCHLORIDE 5 MG/1
5 TABLET ORAL EVERY 4 HOURS PRN
Status: DISCONTINUED | OUTPATIENT
Start: 2019-08-21 | End: 2019-08-23 | Stop reason: HOSPADM

## 2019-08-21 RX ORDER — ARIPIPRAZOLE 5 MG/1
5 TABLET ORAL DAILY
Status: DISCONTINUED | OUTPATIENT
Start: 2019-08-22 | End: 2019-08-23 | Stop reason: HOSPADM

## 2019-08-21 RX ORDER — ERGOCALCIFEROL 1.25 MG/1
CAPSULE ORAL
Refills: 0 | COMMUNITY
Start: 2019-07-30 | End: 2021-01-01

## 2019-08-21 RX ORDER — SODIUM CHLORIDE 9 MG/ML
100 INJECTION, SOLUTION INTRAVENOUS CONTINUOUS
Status: DISCONTINUED | OUTPATIENT
Start: 2019-08-21 | End: 2019-08-22

## 2019-08-21 RX ORDER — CLONAZEPAM 1 MG/1
1 TABLET ORAL
Status: DISCONTINUED | OUTPATIENT
Start: 2019-08-21 | End: 2019-08-23 | Stop reason: HOSPADM

## 2019-08-21 RX ORDER — SULFAMETHOXAZOLE AND TRIMETHOPRIM 800; 160 MG/1; MG/1
1 TABLET ORAL 2 TIMES DAILY
Qty: 20 TABLET | Refills: 0 | Status: SHIPPED | OUTPATIENT
Start: 2019-08-21 | End: 2019-08-21

## 2019-08-21 RX ORDER — CEPHALEXIN 500 MG/1
500 CAPSULE ORAL 2 TIMES DAILY
Qty: 20 CAPSULE | Refills: 0 | Status: SHIPPED | OUTPATIENT
Start: 2019-08-21 | End: 2019-08-21

## 2019-08-21 RX ORDER — MORPHINE SULFATE 4 MG/ML
4 INJECTION, SOLUTION INTRAMUSCULAR; INTRAVENOUS ONCE
Status: COMPLETED | OUTPATIENT
Start: 2019-08-21 | End: 2019-08-21

## 2019-08-21 RX ORDER — HYDROCODONE BITARTRATE AND ACETAMINOPHEN 5; 325 MG/1; MG/1
TABLET ORAL
COMMUNITY
Start: 2019-08-16 | End: 2019-08-21 | Stop reason: SDUPTHER

## 2019-08-21 RX ORDER — FLUCONAZOLE 150 MG/1
150 TABLET ORAL ONCE
Qty: 1 TABLET | Refills: 0 | Status: SHIPPED | OUTPATIENT
Start: 2019-08-21 | End: 2019-08-21

## 2019-08-21 RX ADMIN — VANCOMYCIN HYDROCHLORIDE 1000 MG: 1 INJECTION, SOLUTION INTRAVENOUS at 16:41

## 2019-08-21 RX ADMIN — SODIUM CHLORIDE 1000 ML: 0.9 INJECTION, SOLUTION INTRAVENOUS at 16:41

## 2019-08-21 RX ADMIN — PREGABALIN 75 MG: 75 CAPSULE ORAL at 22:15

## 2019-08-21 RX ADMIN — BACLOFEN 10 MG: 10 TABLET ORAL at 22:15

## 2019-08-21 RX ADMIN — DIPHENHYDRAMINE HCL 12.5 MG: 25 TABLET, COATED ORAL at 20:04

## 2019-08-21 RX ADMIN — MORPHINE SULFATE 2 MG: 2 INJECTION, SOLUTION INTRAMUSCULAR; INTRAVENOUS at 22:56

## 2019-08-21 RX ADMIN — CLONAZEPAM 1 MG: 1 TABLET ORAL at 22:38

## 2019-08-21 RX ADMIN — MORPHINE SULFATE 4 MG: 4 INJECTION INTRAVENOUS at 17:49

## 2019-08-21 RX ADMIN — OXYCODONE HYDROCHLORIDE 5 MG: 5 TABLET ORAL at 20:04

## 2019-08-21 RX ADMIN — SODIUM CHLORIDE 100 ML/HR: 0.9 INJECTION, SOLUTION INTRAVENOUS at 20:12

## 2019-08-21 NOTE — TELEPHONE ENCOUNTER
Pt asking for mobile scooter  Script to be FAXED to [facility] [phone]  PHONE [phone]   Pt stated that Script needs to say medically necessary  [facility] [employer] [facility] so we can FAX over exactly what they need       Ayaka Mahmood MA
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
## 2019-08-21 NOTE — ED PROVIDER NOTES
History  Chief Complaint   Patient presents with    Leg Swelling     States bilateral legs have been red , swollen, painful for 2 weeks, Was seen in [place] and given Keflex and Bactrim had a dose of vancomycin on 8/15 was seen by PCP today took culture and told her to continue with antibiotics, then saw Dr Elisa Yuen who told patint to come to the ED     Patient is a 63-year-old female who presents from her doctor's office for reportedly needing to come to the emergency room for bilateral lower extremity cellulitis  Patient states this all started 2 weeks ago in [place] where 1 of her legs was red and she went to an emergency room in [place] was diagnosed with cellulitis given a dose of IV Vanco and sent home on p  O  Antibiotics  Since then the patient states that it has going to her other leg and both of them are more red and swollen than they have been  She did have a Doppler study between the 1st diagnosis and a 2 day and it was read as negative for DVT  Patient's or primary care doctor today who did a culture of the wound because the legs are having skin openings with drainage  He also change her antibiotics from Keflex to Bactrim  Patient also went to see her orthopedist who evaluated her in because she has artificial hardware in her knee he was concerned about the infection and recommended she come to the emergency room  Patient denies any fevers or chills, she has had mild nausea but no vomiting  She has no chest pain or shortness of breath  Patient states the pain in her legs is moderate to severe which she has been taking p o  Pain medications for  Prior to Admission Medications   Prescriptions Last Dose Informant Patient Reported? Taking?    ARIPiprazole (ABILIFY) 5 mg tablet 8/20/2019 at 2359  Yes Yes   Sig: Take 5 mg by mouth daily   baclofen 10 mg tablet Not Taking at Unknown time  Yes No   Sig: Take 10 mg by mouth 2 (two) times a day   buPROPion (WELLBUTRIN XL) 300 mg 24 hr tablet 8/21/2019 at 0900  Yes Yes   Sig: daily   clonazePAM (KlonoPIN) 0 5 mg tablet 8/20/2019 at 2359  Yes Yes   Sig: Take 0 5 mg by mouth 2 (two) times a day  ergocalciferol (VITAMIN D2) 50,000 units 8/20/2019 at 0900  Yes Yes   Sig: take 1 capsule by mouth every week  FOR 6 WEEKS   erythromycin (ILOTYCIN) ophthalmic ointment Not Taking at Unknown time  Yes No   fentaNYL (DURAGESIC) 50 mcg/hr Past Week at Unknown time  No Yes   Sig: Place 1 patch on the skin every third day Max Daily Amount: 1 patch   mupirocin (BACTROBAN) 2 % ointment 8/21/2019 at 0900  No Yes   Sig: Apply topically 3 (three) times a day   oxyCODONE (OXY-IR) 5 MG capsule 8/20/2019 at 2359  No Yes   Sig: Take 1 capsule (5 mg total) by mouth every 4 (four) hours as needed for moderate pain Max Daily Amount: 30 mg   pantoprazole (PROTONIX) 40 mg tablet 8/20/2019 at 2359 Self Yes Yes   Sig: Take 40 mg by mouth daily     pregabalin (LYRICA) 75 mg capsule 8/20/2019 at 2359  No Yes   Sig: Take 1 capsule (75 mg total) by mouth 3 (three) times a day   sertraline (ZOLOFT) 50 mg tablet 8/20/2019 at 2359  Yes Yes   Sig: Take 100 mg by mouth daily       Facility-Administered Medications: None       Past Medical History:   Diagnosis Date    Arthritis     Depression     Scoliosis        Past Surgical History:   Procedure Laterality Date    APPENDECTOMY      CHOLECYSTECTOMY      DILATION AND CURETTAGE OF UTERUS      GASTRIC BYPASS      HYSTERECTOMY      JOINT REPLACEMENT Right     knee    KNEE ARTHROSCOPY      LAST ASSESSED [id number]    NERVE BLOCK Right 1/6/2017    Procedure: LUMBAR MEDIAL BRANCH BLOCK L3, L4, L5;  Surgeon: Micaela Gallagher MD;  Location: [place] [address] MAIN OR;  Service:     NERVE BLOCK Right 8/12/2016    Procedure: BLOCK MEDIAL BRANCH T9, T10, T11, T12;  Surgeon: Micaela Gallagher MD;  Location: [place] [address] MAIN OR;  Service:     OOPHORECTOMY Bilateral     LA INJ DX/THER AGNT PARAVERT FACET [address], 1ST LEVEL Left 4/28/2017 Procedure: THORACIC MEDIAL BRANCH BLOCK T8-9, T10-11;  Surgeon: Soni Rivera MD;  Location: [facility] MAIN OR;  Service: Pain Management     MI INJECTION,SACROILIAC JOINT Right 6/16/2017    Procedure: SI JOINT INJECTION;  Surgeon: Soni Rivera MD;  Location: [facility] MAIN OR;  Service: Pain Management     RADIOFREQUENCY ABLATION Right 2/17/2017    Procedure: ABLATION RADIO FREQUENCY (RFA)  L3-4, L4-5, L5-S1;  Surgeon: Soni Rivera MD;  Location: [facility] MAIN OR;  Service:    Deannie Horde ABLATION Right 9/23/2016    Procedure: ABLATION RADIO FREQUENCY (RFA)  T11, T12, L1, L2;  Surgeon: Soni Rivera MD;  Location: [facility] MAIN OR;  Service:     REPLACEMENT TOTAL KNEE      LAST ASSESSED [id number]       Family History   Problem Relation Age of Onset    Hypertension Mother     Hypertension Father      I have reviewed and agree with the history as documented  Social History     Tobacco Use    Smoking status: Former Smoker     Packs/day: 1 00     Years: 40 00     Pack years: 40 00     Types: Cigarettes     Last attempt to quit: 8/18/2019    Smokeless tobacco: Never Used   Substance Use Topics    Alcohol use: No    Drug use: No        Review of Systems   Constitutional: Negative for chills and fever  HENT: Negative for facial swelling and trouble swallowing  Eyes: Negative for photophobia and visual disturbance  Respiratory: Negative for chest tightness and shortness of breath  Cardiovascular: Positive for leg swelling  Negative for chest pain  Gastrointestinal: Negative for abdominal pain, nausea and vomiting  Genitourinary: Negative for dysuria and flank pain  Musculoskeletal: Negative for back pain and neck pain  Skin: Positive for color change  Neurological: Negative for weakness and numbness  Hematological: Negative  Psychiatric/Behavioral: Negative  Physical Exam  Physical Exam   Constitutional: She is oriented to person, place, and time   She appears well-developed and well-nourished  Eyes: Pupils are equal, round, and reactive to light  EOM are normal    Neck: Normal range of motion  Cardiovascular: Normal rate and regular rhythm  Pulmonary/Chest: Effort normal and breath sounds normal    Abdominal: Soft  Bowel sounds are normal  She exhibits no distension  There is no tenderness  Musculoskeletal: She exhibits edema and tenderness  Legs:  Neurological: She is alert and oriented to person, place, and time  Skin: There is erythema  Nursing note and vitals reviewed  Vital Signs  ED Triage Vitals [08/21/19 1539]   Temperature Pulse Respirations Blood Pressure SpO2   98 6 °F (37 °C) 88 16 139/62 100 %      Temp Source Heart Rate Source Patient Position - Orthostatic VS BP Location FiO2 (%)   Tympanic Monitor Sitting Right arm --      Pain Score       8           Vitals:    08/21/19 1539   BP: 139/62   Pulse: 88   Patient Position - Orthostatic VS: Sitting         Visual Acuity      ED Medications  Medications   sodium chloride 0 9 % bolus 1,000 mL (1,000 mL Intravenous New Bag 8/21/19 1641)   vancomycin (VANCOCIN) IVPB (premix) 1,000 mg (1,000 mg Intravenous New Bag 8/21/19 1641)   morphine (PF) 4 mg/mL injection 4 mg (4 mg Intravenous Given 8/21/19 1749)       Diagnostic Studies  Results Reviewed     Procedure Component Value Units Date/Time    Lactic acid, plasma [[id number]]  (Normal) Collected:  08/21/19 1618    Lab Status:  Final result Specimen:  Blood from Arm, Right Updated:  08/21/19 1646     LACTIC ACID 1 1 mmol/L     Narrative:       Result may be elevated if tourniquet was used during collection  Blood culture #2 [[id number]] Collected:  08/21/19 1641    Lab Status:   In process Specimen:  Blood from Hand, Left Updated:  08/21/19 1643    Comprehensive metabolic panel [[id number]]  (Abnormal) Collected:  08/21/19 1618    Lab Status:  Final result Specimen:  Blood from Arm, Right Updated:  08/21/19 1643     Sodium 139 mmol/L      Potassium 3 3 mmol/L Chloride 102 mmol/L      CO2 31 mmol/L      ANION GAP 6 mmol/L      BUN 6 mg/dL      Creatinine 0 94 mg/dL      Glucose 98 mg/dL      Calcium 7 9 mg/dL      AST 16 U/L      ALT 12 U/L      Alkaline Phosphatase 110 U/L      Total Protein 6 3 g/dL      Albumin 3 0 g/dL      Total Bilirubin 0 20 mg/dL      eGFR 67 ml/min/1 73sq m     Narrative:       National Kidney Disease Foundation guidelines for Chronic Kidney Disease (CKD):     Stage 1 with normal or high GFR (GFR > 90 mL/min/1 73 square meters)    Stage 2 Mild CKD (GFR = 60-89 mL/min/1 73 square meters)    Stage 3A Moderate CKD (GFR = 45-59 mL/min/1 73 square meters)    Stage 3B Moderate CKD (GFR = 30-44 mL/min/1 73 square meters)    Stage 4 Severe CKD (GFR = 15-29 mL/min/1 73 square meters)    Stage 5 End Stage CKD (GFR <15 mL/min/1 73 square meters)  Note: GFR calculation is accurate only with a steady state creatinine    CBC and differential [[id number]]  (Abnormal) Collected:  08/21/19 1618    Lab Status:  Final result Specimen:  Blood from Arm, Right Updated:  08/21/19 1625     WBC 7 45 Thousand/uL      RBC 4 08 Million/uL      Hemoglobin 11 3 g/dL      Hematocrit 37 0 %      MCV 91 fL      MCH 27 7 pg      MCHC 30 5 g/dL      RDW 17 2 %      MPV 9 4 fL      Platelets 268 Thousands/uL      nRBC 0 /100 WBCs      Neutrophils Relative 73 %      Immat GRANS % 1 %      Lymphocytes Relative 16 %      Monocytes Relative 7 %      Eosinophils Relative 2 %      Basophils Relative 1 %      Neutrophils Absolute 5 58 Thousands/µL      Immature Grans Absolute 0 04 Thousand/uL      Lymphocytes Absolute 1 19 Thousands/µL      Monocytes Absolute 0 49 Thousand/µL      Eosinophils Absolute 0 11 Thousand/µL      Basophils Absolute 0 04 Thousands/µL     Blood culture #1 [[id number]] Collected:  08/21/19 1618    Lab Status:   In process Specimen:  Blood from Arm, Right Updated:  08/21/19 1623                 No orders to display              Procedures  Procedures       ED Course                               MDM  Number of Diagnoses or Management Options  Cellulitis:   Peripheral edema:   Diagnosis management comments: Patient is going to be admitted for bilateral lower extremity cellulitis  Patient started on Vanco the emergency room awaiting the cultures from her primary care doctor  I discussed the findings with the patient and her  and they are in agreement with the assessment plan  Amount and/or Complexity of Data Reviewed  Clinical lab tests: ordered and reviewed  Tests in the radiology section of CPT®: ordered and reviewed        Disposition  Final diagnoses:   Cellulitis   Peripheral edema     Time reflects when diagnosis was documented in both MDM as applicable and the Disposition within this note     Time User Action Codes Description Comment    8/21/2019  5:41 PM Shabana Ian Add [L03 90] Cellulitis     8/21/2019  5:41 PM Shabana Ian Add [R60 9] Peripheral edema     8/21/2019  5:58 PM SHMUEL Pérez 83 [Q35 584] Cellulitis of left lower extremity     8/21/2019  5:58 PM Marquita Trent Add [S81 802A] Wound of left lower extremity       ED Disposition     ED Disposition Condition Date/Time Comment    Admit Stable Wed Aug 21, 2019  5:41 PM Case was discussed with Dr Kaylah Nguyen and the patient's admission status was agreed to be Admission Status: inpatient status to the service of Dr Kaylah Nguyen   Follow-up Information    None         Patient's Medications   Discharge Prescriptions    No medications on file     No discharge procedures on file      ED Provider  Electronically Signed by           Miranda Ugarte MD  08/21/19 5783

## 2019-08-21 NOTE — PROGRESS NOTES
Assessment/Plan:    No problem-specific Assessment & Plan notes found for this encounter  Cellulitis, r/o MRSA or other pathogen, c/s obtained  Await culture to change abx  Cont same regimen, refill given until c/s back  Pain, use oxycodone IR if ok with px mgmt, can use vicodin rx but must let px mgmt know that she has it and plans to fill it so as not to affect any pain contract agreement   Diagnoses and all orders for this visit:    Bilateral lower extremity edema    Cellulitis of left lower extremity  -     Ambulatory referral to Wound Care; Future  -     Wound culture and Gram stain  -     cephalexin (KEFLEX) 500 mg capsule; Take 1 capsule (500 mg total) by mouth 2 (two) times a day for 10 days  -     sulfamethoxazole-trimethoprim (BACTRIM DS) 800-160 mg per tablet; Take 1 tablet by mouth 2 (two) times a day for 10 days  -     fluconazole (DIFLUCAN) 150 mg tablet; Take 1 tablet (150 mg total) by mouth once for 1 dose    Cellulitis of lower extremity, unspecified laterality  -     mupirocin (BACTROBAN) 2 % ointment; Apply topically 3 (three) times a day  -     HYDROcodone-acetaminophen (NORCO) 5-325 mg per tablet; Take 1 tablet by mouth every 6 (six) hours as needed for pain (due to cellulitis)Max Daily Amount: 4 tablets    Opiate use    Other orders  -     Discontinue: cephalexin (KEFLEX) 500 mg capsule  -     ergocalciferol (VITAMIN D2) 50,000 units; take 1 capsule by mouth every week  FOR 6 WEEKS  -     erythromycin (ILOTYCIN) ophthalmic ointment  -     Discontinue: HYDROcodone-acetaminophen (NORCO) 5-325 mg per tablet  -     Discontinue: sulfamethoxazole-trimethoprim (BACTRIM DS) 800-160 mg per tablet  -     ARIPiprazole (ABILIFY) 5 mg tablet; Take 5 mg by mouth daily          No follow-ups on file  Subjective:      Patient ID: Miguelina Garnett is a 61 y o  female      Chief Complaint   Patient presents with    Leg Pain     both legs prcma       HPI  Both legs pain  Was in [place] on vacation  Still on bactrim and keflex and keflex  Hx of cellulitis  Yellow dc and bleeding  Duplex neg for dvt  B/l  Pain bad to walk  No c/s ever taken per pt  No fever  Keflex bid, bactrim bid  Having loose bowels  Some nausea due to GBS  occas vomit  Has oxy IR from px mgmt    The following portions of the patient's history were reviewed and updated as appropriate: allergies, current medications, past family history, past medical history, past social history, past surgical history and problem list     Review of Systems   Respiratory: Negative for shortness of breath  Cardiovascular: Negative for chest pain  Current Outpatient Medications   Medication Sig Dispense Refill    ARIPiprazole (ABILIFY) 5 mg tablet Take 5 mg by mouth daily  0    baclofen 10 mg tablet Take 10 mg by mouth 2 (two) times a day  0    buPROPion (WELLBUTRIN XL) 300 mg 24 hr tablet daily      clonazePAM (KlonoPIN) 0 5 mg tablet Take 0 5 mg by mouth 2 (two) times a day   ergocalciferol (VITAMIN D2) 50,000 units take 1 capsule by mouth every week  FOR 6 WEEKS  0    fentaNYL (DURAGESIC) 50 mcg/hr Place 1 patch on the skin every third day Max Daily Amount: 1 patch 10 patch 0    HYDROcodone-acetaminophen (NORCO) 5-325 mg per tablet Take 1 tablet by mouth every 6 (six) hours as needed for pain (due to cellulitis)Max Daily Amount: 4 tablets 20 tablet 0    mupirocin (BACTROBAN) 2 % ointment Apply topically 3 (three) times a day 22 g 0    oxyCODONE (OXY-IR) 5 MG capsule Take 1 capsule (5 mg total) by mouth every 4 (four) hours as needed for moderate pain Max Daily Amount: 30 mg 90 capsule 0    pantoprazole (PROTONIX) 40 mg tablet Take 40 mg by mouth daily        pregabalin (LYRICA) 75 mg capsule Take 1 capsule (75 mg total) by mouth 3 (three) times a day 270 capsule 1    sertraline (ZOLOFT) 50 mg tablet Take 50 mg by mouth daily  0    sulfamethoxazole-trimethoprim (BACTRIM DS) 800-160 mg per tablet Take 1 tablet by mouth 2 (two) times a day for 10 days 20 tablet 0    cephalexin (KEFLEX) 500 mg capsule Take 1 capsule (500 mg total) by mouth 2 (two) times a day for 10 days 20 capsule 0    erythromycin (ILOTYCIN) ophthalmic ointment       fluconazole (DIFLUCAN) 150 mg tablet Take 1 tablet (150 mg total) by mouth once for 1 dose 1 tablet 0     No current facility-administered medications for this visit  Objective:    /62   Pulse 68   Temp (!) 97 2 °F (36 2 °C)   Resp 16   Ht 5' 2" (1 575 m)   Wt 96 2 kg (212 lb)   BMI 38 78 kg/m²        Physical Exam   Constitutional: She appears well-developed  No distress  HENT:   Head: Normocephalic  Right Ear: External ear normal    Left Ear: External ear normal    Mouth/Throat: No oropharyngeal exudate  Eyes: Conjunctivae are normal  No scleral icterus  Neck: Neck supple  Cardiovascular: Normal rate, normal heart sounds and intact distal pulses  Pulmonary/Chest: Effort normal and breath sounds normal  No respiratory distress  She has no wheezes  Abdominal: Soft  Bowel sounds are normal  She exhibits no distension  Musculoskeletal: She exhibits no edema or deformity  Neurological: She is alert  Skin: Skin is warm and dry  There is erythema  No pallor  Redness and fissuring b/l, left side worse, some purulent drainage on left, serous drainage on right   Psychiatric: Her behavior is normal  Thought content normal    Nursing note and vitals reviewed               Marlon Givens DO

## 2019-08-22 ENCOUNTER — APPOINTMENT (INPATIENT)
Dept: RADIOLOGY | Facility: HOSPITAL | Age: 59
DRG: 603 | End: 2019-08-22
Payer: COMMERCIAL

## 2019-08-22 PROBLEM — F11.20 OPIOID DEPENDENCE (HCC): Status: ACTIVE | Noted: 2017-07-10

## 2019-08-22 LAB
ANION GAP SERPL CALCULATED.3IONS-SCNC: 7 MMOL/L (ref 4–13)
BASOPHILS # BLD AUTO: 0.02 THOUSANDS/ΜL (ref 0–0.1)
BASOPHILS NFR BLD AUTO: 0 % (ref 0–1)
BUN SERPL-MCNC: 7 MG/DL (ref 5–25)
CALCIUM SERPL-MCNC: 7.4 MG/DL (ref 8.3–10.1)
CHLORIDE SERPL-SCNC: 104 MMOL/L (ref 100–108)
CO2 SERPL-SCNC: 28 MMOL/L (ref 21–32)
CREAT SERPL-MCNC: 0.81 MG/DL (ref 0.6–1.3)
EOSINOPHIL # BLD AUTO: 0.13 THOUSAND/ΜL (ref 0–0.61)
EOSINOPHIL NFR BLD AUTO: 2 % (ref 0–6)
ERYTHROCYTE [DISTWIDTH] IN BLOOD BY AUTOMATED COUNT: 17.1 % (ref 11.6–15.1)
FERRITIN SERPL-MCNC: 30 NG/ML (ref 8–388)
GFR SERPL CREATININE-BSD FRML MDRD: 80 ML/MIN/1.73SQ M
GLUCOSE SERPL-MCNC: 109 MG/DL (ref 65–140)
HCT VFR BLD AUTO: 31.4 % (ref 34.8–46.1)
HGB BLD-MCNC: 9.6 G/DL (ref 11.5–15.4)
IMM GRANULOCYTES # BLD AUTO: 0.03 THOUSAND/UL (ref 0–0.2)
IMM GRANULOCYTES NFR BLD AUTO: 1 % (ref 0–2)
IRON SATN MFR SERPL: 11 %
IRON SERPL-MCNC: 25 UG/DL (ref 50–170)
LYMPHOCYTES # BLD AUTO: 1.62 THOUSANDS/ΜL (ref 0.6–4.47)
LYMPHOCYTES NFR BLD AUTO: 26 % (ref 14–44)
MAGNESIUM SERPL-MCNC: 1.9 MG/DL (ref 1.6–2.6)
MCH RBC QN AUTO: 27.7 PG (ref 26.8–34.3)
MCHC RBC AUTO-ENTMCNC: 30.6 G/DL (ref 31.4–37.4)
MCV RBC AUTO: 91 FL (ref 82–98)
MONOCYTES # BLD AUTO: 0.36 THOUSAND/ΜL (ref 0.17–1.22)
MONOCYTES NFR BLD AUTO: 6 % (ref 4–12)
NEUTROPHILS # BLD AUTO: 4.05 THOUSANDS/ΜL (ref 1.85–7.62)
NEUTS SEG NFR BLD AUTO: 65 % (ref 43–75)
NRBC BLD AUTO-RTO: 0 /100 WBCS
PHOSPHATE SERPL-MCNC: 3.4 MG/DL (ref 2.7–4.5)
PLATELET # BLD AUTO: 273 THOUSANDS/UL (ref 149–390)
PMV BLD AUTO: 9.8 FL (ref 8.9–12.7)
POTASSIUM SERPL-SCNC: 3.2 MMOL/L (ref 3.5–5.3)
RBC # BLD AUTO: 3.46 MILLION/UL (ref 3.81–5.12)
SODIUM SERPL-SCNC: 139 MMOL/L (ref 136–145)
TIBC SERPL-MCNC: 233 UG/DL (ref 250–450)
WBC # BLD AUTO: 6.21 THOUSAND/UL (ref 4.31–10.16)

## 2019-08-22 PROCEDURE — 80048 BASIC METABOLIC PNL TOTAL CA: CPT | Performed by: STUDENT IN AN ORGANIZED HEALTH CARE EDUCATION/TRAINING PROGRAM

## 2019-08-22 PROCEDURE — 83550 IRON BINDING TEST: CPT | Performed by: NURSE PRACTITIONER

## 2019-08-22 PROCEDURE — 83735 ASSAY OF MAGNESIUM: CPT | Performed by: STUDENT IN AN ORGANIZED HEALTH CARE EDUCATION/TRAINING PROGRAM

## 2019-08-22 PROCEDURE — 83540 ASSAY OF IRON: CPT | Performed by: NURSE PRACTITIONER

## 2019-08-22 PROCEDURE — 82728 ASSAY OF FERRITIN: CPT | Performed by: NURSE PRACTITIONER

## 2019-08-22 PROCEDURE — 85025 COMPLETE CBC W/AUTO DIFF WBC: CPT | Performed by: STUDENT IN AN ORGANIZED HEALTH CARE EDUCATION/TRAINING PROGRAM

## 2019-08-22 PROCEDURE — 87081 CULTURE SCREEN ONLY: CPT | Performed by: NURSE PRACTITIONER

## 2019-08-22 PROCEDURE — 99232 SBSQ HOSP IP/OBS MODERATE 35: CPT | Performed by: STUDENT IN AN ORGANIZED HEALTH CARE EDUCATION/TRAINING PROGRAM

## 2019-08-22 PROCEDURE — 84100 ASSAY OF PHOSPHORUS: CPT | Performed by: STUDENT IN AN ORGANIZED HEALTH CARE EDUCATION/TRAINING PROGRAM

## 2019-08-22 RX ORDER — POTASSIUM CHLORIDE 20 MEQ/1
40 TABLET, EXTENDED RELEASE ORAL ONCE
Status: COMPLETED | OUTPATIENT
Start: 2019-08-22 | End: 2019-08-22

## 2019-08-22 RX ORDER — CLOTRIMAZOLE 1 %
1 CREAM (GRAM) TOPICAL 2 TIMES DAILY
Status: DISCONTINUED | OUTPATIENT
Start: 2019-08-22 | End: 2019-08-23 | Stop reason: HOSPADM

## 2019-08-22 RX ORDER — DOCUSATE SODIUM 100 MG/1
100 CAPSULE, LIQUID FILLED ORAL DAILY
Status: DISCONTINUED | OUTPATIENT
Start: 2019-08-22 | End: 2019-08-23 | Stop reason: HOSPADM

## 2019-08-22 RX ORDER — FUROSEMIDE 10 MG/ML
20 INJECTION INTRAMUSCULAR; INTRAVENOUS ONCE
Status: DISCONTINUED | OUTPATIENT
Start: 2019-08-22 | End: 2019-08-22

## 2019-08-22 RX ORDER — CEFAZOLIN SODIUM 2 G/50ML
2000 SOLUTION INTRAVENOUS EVERY 8 HOURS
Status: DISCONTINUED | OUTPATIENT
Start: 2019-08-22 | End: 2019-08-23 | Stop reason: HOSPADM

## 2019-08-22 RX ORDER — IRON POLYSACCHARIDE COMPLEX 150 MG
150 CAPSULE ORAL DAILY
Status: DISCONTINUED | OUTPATIENT
Start: 2019-08-23 | End: 2019-08-23 | Stop reason: HOSPADM

## 2019-08-22 RX ORDER — AMMONIUM LACTATE 12 G/100G
LOTION TOPICAL 2 TIMES DAILY PRN
Status: DISCONTINUED | OUTPATIENT
Start: 2019-08-22 | End: 2019-08-23 | Stop reason: HOSPADM

## 2019-08-22 RX ADMIN — ENOXAPARIN SODIUM 40 MG: 40 INJECTION SUBCUTANEOUS at 08:15

## 2019-08-22 RX ADMIN — POTASSIUM CHLORIDE 40 MEQ: 1500 TABLET, EXTENDED RELEASE ORAL at 08:32

## 2019-08-22 RX ADMIN — PANTOPRAZOLE SODIUM 40 MG: 40 TABLET, DELAYED RELEASE ORAL at 08:15

## 2019-08-22 RX ADMIN — Medication 1 APPLICATION: at 10:30

## 2019-08-22 RX ADMIN — CLOTRIMAZOLE 1 APPLICATION: 10 CREAM TOPICAL at 10:30

## 2019-08-22 RX ADMIN — OXYCODONE HYDROCHLORIDE 5 MG: 5 TABLET ORAL at 07:52

## 2019-08-22 RX ADMIN — BACLOFEN 10 MG: 10 TABLET ORAL at 08:15

## 2019-08-22 RX ADMIN — CEFAZOLIN SODIUM 2000 MG: 2 SOLUTION INTRAVENOUS at 13:45

## 2019-08-22 RX ADMIN — MORPHINE SULFATE 2 MG: 2 INJECTION, SOLUTION INTRAMUSCULAR; INTRAVENOUS at 22:05

## 2019-08-22 RX ADMIN — CLOTRIMAZOLE 1 APPLICATION: 10 CREAM TOPICAL at 22:04

## 2019-08-22 RX ADMIN — ARIPIPRAZOLE 5 MG: 5 TABLET ORAL at 08:15

## 2019-08-22 RX ADMIN — BUPROPION HYDROCHLORIDE 300 MG: 150 TABLET, FILM COATED, EXTENDED RELEASE ORAL at 08:14

## 2019-08-22 RX ADMIN — CEFAZOLIN SODIUM 2000 MG: 2 SOLUTION INTRAVENOUS at 22:05

## 2019-08-22 RX ADMIN — CLONAZEPAM 1 MG: 1 TABLET ORAL at 22:05

## 2019-08-22 RX ADMIN — DOCUSATE SODIUM 100 MG: 100 CAPSULE, LIQUID FILLED ORAL at 13:45

## 2019-08-22 RX ADMIN — PREGABALIN 75 MG: 75 CAPSULE ORAL at 08:15

## 2019-08-22 RX ADMIN — MORPHINE SULFATE 2 MG: 2 INJECTION, SOLUTION INTRAMUSCULAR; INTRAVENOUS at 16:39

## 2019-08-22 RX ADMIN — SERTRALINE HYDROCHLORIDE 100 MG: 100 TABLET ORAL at 08:15

## 2019-08-22 RX ADMIN — OXYCODONE HYDROCHLORIDE 5 MG: 5 TABLET ORAL at 00:12

## 2019-08-22 RX ADMIN — PREGABALIN 75 MG: 75 CAPSULE ORAL at 22:05

## 2019-08-22 RX ADMIN — PREGABALIN 75 MG: 75 CAPSULE ORAL at 16:39

## 2019-08-22 RX ADMIN — FENTANYL 1 PATCH: 50 PATCH TRANSDERMAL at 08:14

## 2019-08-22 RX ADMIN — OXYCODONE HYDROCHLORIDE 5 MG: 5 TABLET ORAL at 19:50

## 2019-08-22 RX ADMIN — BACLOFEN 10 MG: 10 TABLET ORAL at 17:49

## 2019-08-22 RX ADMIN — MORPHINE SULFATE 2 MG: 2 INJECTION, SOLUTION INTRAMUSCULAR; INTRAVENOUS at 10:30

## 2019-08-22 RX ADMIN — VANCOMYCIN HYDROCHLORIDE 1000 MG: 1 INJECTION, SOLUTION INTRAVENOUS at 05:03

## 2019-08-22 RX ADMIN — DIPHENHYDRAMINE HCL 12.5 MG: 25 TABLET, COATED ORAL at 07:52

## 2019-08-22 RX ADMIN — MORPHINE SULFATE 2 MG: 2 INJECTION, SOLUTION INTRAMUSCULAR; INTRAVENOUS at 03:21

## 2019-08-22 RX ADMIN — SODIUM CHLORIDE 100 ML/HR: 0.9 INJECTION, SOLUTION INTRAVENOUS at 05:04

## 2019-08-22 RX ADMIN — OXYCODONE HYDROCHLORIDE 5 MG: 5 TABLET ORAL at 13:45

## 2019-08-22 RX ADMIN — POTASSIUM CHLORIDE 40 MEQ: 1500 TABLET, EXTENDED RELEASE ORAL at 16:39

## 2019-08-22 NOTE — ASSESSMENT & PLAN NOTE
Patient presenting with 2 weeks of BLE swelling, redness, pain, peeling and weeping  Was in [place] 2 weeks ago and got sunburned with skin peeling, L > R  Has been on PO Abx as OP for 2 weeks with no improvement  No evidence of sepsis  · MRSA screen, negative   · Treated with IV Vancomycin then transitioned to IV Ancef  · Will discharge home on Keflex 500 mg q12h for 5 more days to complete a 7-day course of therapy  · Local wound care per wound care nurse recs   · Elevate legs  · Lac hydrin to BLE   · Follow-up in the wound care center

## 2019-08-22 NOTE — PLAN OF CARE
Problem: PAIN - ADULT  Goal: Verbalizes/displays adequate comfort level or baseline comfort level  Description  Interventions:  - Encourage patient to monitor pain and request assistance  - Assess pain using appropriate pain scale  - Administer analgesics based on type and severity of pain and evaluate response  - Implement non-pharmacological measures as appropriate and evaluate response  - Consider cultural and social influences on pain and pain management  - Notify physician/advanced practitioner if interventions unsuccessful or patient reports new pain  8/22/2019 1102 by Pete Charlton RN  Outcome: Progressing PT RECEIVING PAIN MEDICATION  PT REPOSITIONED   DISTRACTION TELEVISION  8/22/2019 1102 by Pete Charlton RN  Outcome: Progressing     Problem: INFECTION - ADULT  Goal: Absence or prevention of progression during hospitalization  Description  INTERVENTIONS:  - Assess and monitor for signs and symptoms of infection  - Monitor lab/diagnostic results  - Monitor all insertion sites, i e  indwelling lines, tubes, and drains  - Monitor endotracheal if appropriate and nasal secretions for changes in amount and color  - [place] appropriate cooling/warming therapies per order  - Administer medications as ordered  - Instruct and encourage patient and family to use good hand hygiene technique  - Identify and instruct in appropriate isolation precautions for identified infection/condition  8/22/2019 1102 by Pete Charlton RN  Outcome: Progressing  8/22/2019 1102 by Pete Charlton RN  Outcome: Progressing     Problem: DISCHARGE PLANNING  Goal: Discharge to home or other facility with appropriate resources  Description  INTERVENTIONS:  - Identify barriers to discharge w/patient and caregiver  - Arrange for needed discharge resources and transportation as appropriate  - Identify discharge learning needs (meds, wound care, etc )  - Arrange for interpretive services to assist at discharge as needed  - Refer to Case Management Department for coordinating discharge planning if the patient needs post-hospital services based on physician/advanced practitioner order or complex needs related to functional status, cognitive ability, or social support system  8/22/2019 1102 by Renee Lucia RN  Outcome: Progressing  8/22/2019 1102 by Renee Lucia RN  Outcome: Progressing

## 2019-08-22 NOTE — ASSESSMENT & PLAN NOTE
Patient presenting with 2 weeks of BLE swelling, redness, pain, peeling and weeping  Was in [place] 2 weeks ago and got sunburned with skin peeling     Has been on PO Abx as OP for 2 weeks with no improvement  No evidence of sepsis  · IV vancomycin  · Pharmacy consult for vanco dosing  · MRSA screen pending   · Elevate legs  · Wound care consult   · Lac hydrin to BLE and Lotrimin to toes  · Pain control

## 2019-08-22 NOTE — PLAN OF CARE
Problem: PAIN - ADULT  Goal: Verbalizes/displays adequate comfort level or baseline comfort level  Description  Interventions:  - Encourage patient to monitor pain and request assistance  - Assess pain using numerical pain scale  - Administer analgesics based on type and severity of pain and evaluate response  - Implement non-pharmacological measures as appropriate and evaluate response  - Consider cultural and social influences on pain and pain management  - Notify physician/advanced practitioner if interventions unsuccessful or patient reports new pain  8/21/2019 2200 by Annia Gabriel RN  Outcome: Progressing  8/21/2019 2200 by Annia Gabriel RN  Outcome: Progressing     Problem: INFECTION - ADULT  Goal: Absence or prevention of progression during hospitalization  Description  INTERVENTIONS:  - Assess and monitor for signs and symptoms of infection  - Monitor lab/diagnostic results  - Monitor all insertion sites, i e  indwelling lines, tubes, and drains  - Administer medications as ordered  - Instruct and encourage patient and family to use good hand hygiene technique    8/21/2019 2200 by Annia Gabriel RN  Outcome: Progressing  8/21/2019 2200 by Annia Gabriel RN  Outcome: Progressing     Problem: DISCHARGE PLANNING  Goal: Discharge to home or other facility with appropriate resources  Description  INTERVENTIONS:  - Identify barriers to discharge w/patient and caregiver  - Arrange for needed discharge resources and transportation as appropriate  - Identify discharge learning needs (meds, wound care, etc )  - Arrange for interpretive services to assist at discharge as needed  - Refer to Case Management Department for coordinating discharge planning if the patient needs post-hospital services based on physician/advanced practitioner order or complex needs related to functional status, cognitive ability, or social support system  8/21/2019 2200 by Annia Gabriel RN  Outcome: Progressing  8/21/2019 2200 by Cindi Veloz, RN  Outcome: Progressing

## 2019-08-22 NOTE — UTILIZATION REVIEW
Initial Clinical Review    Admission: Date/Time/Statement: Inpatient Admission Orders (From admission, onward)     Ordered        08/21/19 1742  Inpatient Admission  Once                   Orders Placed This Encounter   Procedures    Inpatient Admission     Standing Status:   Standing     Number of Occurrences:   1     Order Specific Question:   Admitting Physician     Answer:   Ines Clark     Order Specific Question:   Level of Care     Answer:   Med Surg [16]     Order Specific Question:   Estimated length of stay     Answer:   More than 2 Midnights     Order Specific Question:   Certification     Answer:   I certify that inpatient services are medically necessary for this patient for a duration of greater than two midnights  See H&P and MD Progress Notes for additional information about the patient's course of treatment  ED Arrival Information     Expected Arrival Acuity Means of Arrival Escorted By Service Admission Type    - 8/21/2019 15:19 Urgent Walk-In Spouse Hospitalist Urgent    Arrival Complaint    leg swelling        Chief Complaint   Patient presents with    Leg Swelling     States bilateral legs have been red , swollen, painful for 2 weeks, Was seen in [place] and given Keflex and Bactrim had a dose of vancomycin on 8/15 was seen by PCP today took culture and told her to continue with antibiotics, then saw Dr Mini Delatorre who told patint to come to the ED     Assessment/Plan:   60 YO FEMALE TO ER AMBULATORY FROM [address] [place] [address] TO [place] ER 2 WEEKS AGO & GIVEN ABT, THEN CHANGED FROM KEFLEX TO BACTRIM BY PCP AFTER RETURNING HOME  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, THEREFORE, SAW ORTHOPEDIST THAT RECOMMENDED EVAL IN ER  DRAINAGE CULTURES SENT BY PCP PENDING  HX FIBROMYALGIA, DEPRESSION, ANXIETY, CHRONIC NARCOTIC & BENZO USE  PRESENTS WITH BLE SWELLING, REDNESS, WARMTH & INCREASED PAIN   ADMITTED TO INPATIENT STATUS FOR BLE CELLULITIS AFTER FAILING OUTPATIENT TX ON 2 PO ABT  STARTED ON IVABT & IVF, WOUND CARE CONSULTED           ED Triage Vitals [08/21/19 1539]   Temperature Pulse Respirations Blood Pressure SpO2   98 6 °F (37 °C) 88 16 139/62 100 %      Temp Source Heart Rate Source Patient Position - Orthostatic VS BP Location FiO2 (%)   Tympanic Monitor Sitting Right arm --      Pain Score       8        Wt Readings from Last 1 Encounters:   08/21/19 96 7 kg (213 lb 3 oz)     Additional Vital Signs:   08/21/19 2357  98 8 °F (37 1 °C)  74  17  120/61  --  99 %  --  --   08/21/19 19:42:41  99 3 °F (37 4 °C)  77  18  126/62  83  99 %  None (Room air)  Sitting     Pertinent Labs/Diagnostic Test Results:   Results from last 7 days   Lab Units 08/22/19  0551 08/21/19  1618   WBC Thousand/uL 6 21 7 45   HEMOGLOBIN g/dL 9 6* 11 3*   HEMATOCRIT % 31 4* 37 0   PLATELETS Thousands/uL 273 329   NEUTROS ABS Thousands/µL 4 05 5 58     Results from last 7 days   Lab Units 08/22/19  0551 08/21/19  1618   SODIUM mmol/L 139 139   POTASSIUM mmol/L 3 2* 3 3*   CHLORIDE mmol/L 104 102   CO2 mmol/L 28 31   ANION GAP mmol/L 7 6   BUN mg/dL 7 6   CREATININE mg/dL 0 81 0 94   EGFR ml/min/1 73sq m 80 67   CALCIUM mg/dL 7 4* 7 9*   MAGNESIUM mg/dL 1 9  --    PHOSPHORUS mg/dL 3 4  --      Results from last 7 days   Lab Units 08/21/19  1618   AST U/L 16   ALT U/L 12   ALK PHOS U/L 110   TOTAL PROTEIN g/dL 6 3*   ALBUMIN g/dL 3 0*   TOTAL BILIRUBIN mg/dL 0 20     Results from last 7 days   Lab Units 08/22/19  0551 08/21/19  1618   GLUCOSE RANDOM mg/dL 109 98     Results from last 7 days   Lab Units 08/21/19  1618   LACTIC ACID mmol/L 1 1     Results from last 7 days   Lab Units 08/21/19  1130   GRAM STAIN RESULT  No Polys  No organisms seen     ED Treatment:   Medication Administration from 08/21/2019 1519 to 08/21/2019 1825       Date/Time Order Dose Route     08/21/2019 1641 sodium chloride 0 9 % bolus 1,000 mL 1,000 mL Intravenous     08/21/2019 1642 vancomycin (VANCOCIN) IVPB (premix) 1,000 mg 1,000 mg Intravenous     08/21/2019 1749 morphine (PF) 4 mg/mL injection 4 mg 4 mg Intravenous        Past Medical History:   Diagnosis Date    Arthritis     Depression     Scoliosis      Present on Admission:   Chronic bilateral low back pain without sciatica   Depression   Fibromyalgia   Cellulitis of bilateral lower extremities   Opiate use  Admitting Diagnosis: Cellulitis [L03 90]  Peripheral edema [R60 9]  Leg swelling [M79 89]  Cellulitis of left lower extremity [L03 116]  Wound of left lower extremity [S81 802A]  Age/Sex: 61 y o  female  Admission Orders:  MED SURG  CONSULT WOUND CARE  VENODYNES  Current Facility-Administered Medications:  acetaminophen 650 mg Oral Q6H PRN   ammonium lactate  Topical BID PRN   ARIPiprazole 5 mg Oral Daily   baclofen 10 mg Oral BID   buPROPion 300 mg Oral Daily   clonazePAM 1 mg Oral HS   enoxaparin 40 mg Subcutaneous Daily   fentaNYL 1 patch Transdermal Q72H   morphine injection 2 mg Intravenous Q4H PRN   ondansetron 4 mg Intravenous Q6H PRN   oxyCODONE 5 mg Oral Q4H PRN   pantoprazole 40 mg Oral Daily   pregabalin 75 mg Oral TID   sertraline 100 mg Oral Daily   sodium chloride 100 mL/hr Intravenous Continuous   vancomycin 15 mg/kg (Adjusted) Intravenous Q12H     Network Utilization Review Department  Phone: [phone]; Fax [phone]  [email or web address]  ATTENTION: Please call with any questions or concerns to [phone] and carefully listen to the prompts so that you are directed to the right person  Send all requests for admission clinical reviews, approved or denied determinations and any other requests to fax [phone]   All voicemails are confidential

## 2019-08-22 NOTE — PROGRESS NOTES
Progress Note - Shabnam Devi 1960, 61 y o  female MRN: [id number]    Unit/Bed#: [address] [address] A Encounter: [id number]    Primary Care Provider: Jose Daniel Drew,    Date and time admitted to hospital: 8/21/2019  3:58 PM        * Cellulitis of bilateral lower extremities  Assessment & Plan  Patient presenting with 2 weeks of BLE swelling, redness, pain, peeling and weeping  Was in [place] 2 weeks ago and got sunburned with skin peeling  Has been on PO Abx as OP for 2 weeks with no improvement  No evidence of sepsis  · IV vancomycin  · Pharmacy consult for vanco dosing  · MRSA screen pending   · Elevate legs  · Wound care consult   · Lac hydrin to BLE and Lotrimin to toes  · Pain control    Opioid dependence ([facility] [address] )  Assessment & Plan  meds reviewed in     Fibromyalgia  Assessment & Plan  Continue pain meds as noted    Depression  Assessment & Plan  Continue home meds: zoloft, wellbutrin, abilify    Chronic bilateral low back pain without sciatica  Assessment & Plan   reviewed  Continue patients home meds: fentanyl, lyrica, oxycodone PRN      VTE Pharmacologic Prophylaxis:   Pharmacologic: Enoxaparin (Lovenox)  Mechanical VTE Prophylaxis in Place: Yes    Patient Centered Rounds: I have performed bedside rounds with nursing staff today  Discussions with Specialists or Other Care Team Provider: Nursing, CM    Education and Discussions with Family / Patient: I have answered all questions to the best of my ability  Time Spent for Care: 20 minutes  More than 50% of total time spent on counseling and coordination of care as described above  Current Length of Stay: 1 day(s)    Current Patient Status: Inpatient   Certification Statement: The patient will continue to require additional inpatient hospital stay due to BLE cellulitis    Discharge Plan: Patient is not medically stable for discharge today, likely in 24-48 hours pending progress       Code Status: Level 1 - Full Code      Subjective: Continues with BLE swelling  No chest pain or SOB  Wants to shower  Objective:     Vitals:   Temp (24hrs), Av 7 °F (37 1 °C), Min:98 °F (36 7 °C), Max:99 3 °F (37 4 °C)    Temp:  [98 °F (36 7 °C)-99 3 °F (37 4 °C)] 98 9 °F (37 2 °C)  HR:  [71-88] 73  Resp:  [16-18] 16  BP: (104-139)/(51-63) 104/51  SpO2:  [99 %-100 %] 100 %  Body mass index is 38 99 kg/m²  Input and Output Summary (last 24 hours): Intake/Output Summary (Last 24 hours) at 2019 1314  Last data filed at 2019 0504  Gross per 24 hour   Intake 1460 ml   Output --   Net 1460 ml       Physical Exam:     Physical Exam   Constitutional: She is oriented to person, place, and time  She appears well-developed  No distress  HENT:   Head: Normocephalic  Neck: Normal range of motion  Cardiovascular: Normal rate, regular rhythm and intact distal pulses  Pulmonary/Chest: Effort normal and breath sounds normal  No respiratory distress  She has no wheezes  She has no rhonchi  She has no rales  Abdominal: Soft  Bowel sounds are normal  She exhibits no distension  There is no tenderness  Musculoskeletal: Normal range of motion  She exhibits no edema (+2 BLE) or tenderness  Neurological: She is alert and oriented to person, place, and time  She has normal reflexes  Skin: Skin is warm and dry  Rash (BLE) noted  She is not diaphoretic  There is erythema (BLE)  Lower extremity cellulitis with dry flaky skin surrounding superficial wounds  No drainage  Dry, flaky toes  Psychiatric: Her speech is normal and behavior is normal  Judgment normal  Her mood appears anxious  Her affect is blunt and labile  Cognition and memory are normal    Nursing note and vitals reviewed        Additional Data:     Labs:    Results from last 7 days   Lab Units 19  0551   WBC Thousand/uL 6 21   HEMOGLOBIN g/dL 9 6*   HEMATOCRIT % 31 4*   PLATELETS Thousands/uL 273   NEUTROS PCT % 65   LYMPHS PCT % 26   MONOS PCT % 6   EOS PCT % 2     Results from last 7 days   Lab Units 08/22/19  0551 08/21/19  1618   POTASSIUM mmol/L 3 2* 3 3*   CHLORIDE mmol/L 104 102   CO2 mmol/L 28 31   BUN mg/dL 7 6   CREATININE mg/dL 0 81 0 94   CALCIUM mg/dL 7 4* 7 9*   ALK PHOS U/L  --  110   ALT U/L  --  12   AST U/L  --  16           * I Have Reviewed All Lab Data Listed Above  * Additional Pertinent Lab Tests Reviewed: All Labs Within Last 24 Hours Reviewed    Imaging:    Imaging Reports Reviewed Today Include: None  Imaging Personally Reviewed by Myself Includes:  None     Recent Cultures (last 7 days):     Results from last 7 days   Lab Units 08/21/19  1130   GRAM STAIN RESULT  No Polys  No organisms seen   WOUND CULTURE  No growth       Last 24 Hours Medication List:     Current Facility-Administered Medications:  acetaminophen 650 mg Oral Q6H PRN Dominique Mederos MD    ammonium lactate  Topical BID PRN JONO Steel    ARIPiprazole 5 mg Oral Daily Dominique Mederos MD    baclofen 10 mg Oral BID Dominique Mederos MD    buPROPion 300 mg Oral Daily Dominique Mederos MD    clonazePAM 1 mg Oral HS Gladis Justin MD    clotrimazole 1 application Topical BID JONO Steel    enoxaparin 40 mg Subcutaneous Daily Dominique Mederos MD    fentaNYL 1 patch Transdermal Keegan Stokes MD    morphine injection 2 mg Intravenous Q4H PRN Dominique Mederos MD    ondansetron 4 mg Intravenous Q6H PRN Dominique Mederos MD    oxyCODONE 5 mg Oral Q4H PRN Dominique Mederos MD    pantoprazole 40 mg Oral Daily Dominique Mederos MD    potassium chloride 40 mEq Oral Once JONO Steel    pregabalin 75 mg Oral TID Dominique Mederos MD    sertraline 100 mg Oral Daily Dominique Mederos MD    vancomycin 15 mg/kg (Adjusted) Intravenous Q12H Gladis Justin MD Last Rate: 1,000 mg (08/22/19 0503)        Today, Patient Was Seen By: JONO Steel    ** Please Note: Dictation voice to text software may have been used in the creation of this document   **

## 2019-08-23 ENCOUNTER — APPOINTMENT (INPATIENT)
Dept: RADIOLOGY | Facility: HOSPITAL | Age: 59
DRG: 603 | End: 2019-08-23
Payer: COMMERCIAL

## 2019-08-23 VITALS
RESPIRATION RATE: 18 BRPM | HEIGHT: 62 IN | TEMPERATURE: 98.1 F | BODY MASS INDEX: 39.11 KG/M2 | HEART RATE: 64 BPM | WEIGHT: 212.52 LBS | SYSTOLIC BLOOD PRESSURE: 102 MMHG | OXYGEN SATURATION: 100 % | DIASTOLIC BLOOD PRESSURE: 54 MMHG

## 2019-08-23 LAB
ANION GAP SERPL CALCULATED.3IONS-SCNC: 7 MMOL/L (ref 4–13)
BACTERIA WND AEROBE CULT: NORMAL
BASOPHILS # BLD AUTO: 0.06 THOUSANDS/ΜL (ref 0–0.1)
BASOPHILS NFR BLD AUTO: 1 % (ref 0–1)
BUN SERPL-MCNC: 6 MG/DL (ref 5–25)
CALCIUM SERPL-MCNC: 7.9 MG/DL (ref 8.3–10.1)
CHLORIDE SERPL-SCNC: 105 MMOL/L (ref 100–108)
CO2 SERPL-SCNC: 24 MMOL/L (ref 21–32)
CREAT SERPL-MCNC: 0.78 MG/DL (ref 0.6–1.3)
EOSINOPHIL # BLD AUTO: 0.14 THOUSAND/ΜL (ref 0–0.61)
EOSINOPHIL NFR BLD AUTO: 3 % (ref 0–6)
ERYTHROCYTE [DISTWIDTH] IN BLOOD BY AUTOMATED COUNT: 17.2 % (ref 11.6–15.1)
GFR SERPL CREATININE-BSD FRML MDRD: 83 ML/MIN/1.73SQ M
GLUCOSE SERPL-MCNC: 86 MG/DL (ref 65–140)
GRAM STN SPEC: NORMAL
GRAM STN SPEC: NORMAL
HCT VFR BLD AUTO: 38.4 % (ref 34.8–46.1)
HGB BLD-MCNC: 11.2 G/DL (ref 11.5–15.4)
IMM GRANULOCYTES # BLD AUTO: 0.03 THOUSAND/UL (ref 0–0.2)
IMM GRANULOCYTES NFR BLD AUTO: 1 % (ref 0–2)
LYMPHOCYTES # BLD AUTO: 1.44 THOUSANDS/ΜL (ref 0.6–4.47)
LYMPHOCYTES NFR BLD AUTO: 25 % (ref 14–44)
MCH RBC QN AUTO: 27.9 PG (ref 26.8–34.3)
MCHC RBC AUTO-ENTMCNC: 29.2 G/DL (ref 31.4–37.4)
MCV RBC AUTO: 96 FL (ref 82–98)
MONOCYTES # BLD AUTO: 0.39 THOUSAND/ΜL (ref 0.17–1.22)
MONOCYTES NFR BLD AUTO: 7 % (ref 4–12)
MRSA NOSE QL CULT: NORMAL
NEUTROPHILS # BLD AUTO: 3.6 THOUSANDS/ΜL (ref 1.85–7.62)
NEUTS SEG NFR BLD AUTO: 63 % (ref 43–75)
NRBC BLD AUTO-RTO: 0 /100 WBCS
PLATELET # BLD AUTO: 275 THOUSANDS/UL (ref 149–390)
PMV BLD AUTO: 10.3 FL (ref 8.9–12.7)
POTASSIUM SERPL-SCNC: 4 MMOL/L (ref 3.5–5.3)
RBC # BLD AUTO: 4.02 MILLION/UL (ref 3.81–5.12)
SODIUM SERPL-SCNC: 136 MMOL/L (ref 136–145)
WBC # BLD AUTO: 5.66 THOUSAND/UL (ref 4.31–10.16)

## 2019-08-23 PROCEDURE — 93970 EXTREMITY STUDY: CPT | Performed by: SURGERY

## 2019-08-23 PROCEDURE — 85025 COMPLETE CBC W/AUTO DIFF WBC: CPT | Performed by: NURSE PRACTITIONER

## 2019-08-23 PROCEDURE — 99239 HOSP IP/OBS DSCHRG MGMT >30: CPT | Performed by: NURSE PRACTITIONER

## 2019-08-23 PROCEDURE — 93970 EXTREMITY STUDY: CPT

## 2019-08-23 PROCEDURE — 80048 BASIC METABOLIC PNL TOTAL CA: CPT | Performed by: NURSE PRACTITIONER

## 2019-08-23 RX ORDER — IRON POLYSACCHARIDE COMPLEX 150 MG
150 CAPSULE ORAL DAILY
Qty: 30 CAPSULE | Refills: 0 | Status: SHIPPED | OUTPATIENT
Start: 2019-08-24 | End: 2019-08-28 | Stop reason: SDUPTHER

## 2019-08-23 RX ORDER — CEPHALEXIN 500 MG/1
500 CAPSULE ORAL EVERY 12 HOURS SCHEDULED
Qty: 10 CAPSULE | Refills: 0 | Status: SHIPPED | OUTPATIENT
Start: 2019-08-23 | End: 2019-08-28

## 2019-08-23 RX ORDER — DOCUSATE SODIUM 100 MG/1
100 CAPSULE, LIQUID FILLED ORAL DAILY
Qty: 30 CAPSULE | Refills: 0 | Status: SHIPPED | OUTPATIENT
Start: 2019-08-24 | End: 2019-09-09 | Stop reason: ALTCHOICE

## 2019-08-23 RX ORDER — AMMONIUM LACTATE 12 G/100G
LOTION TOPICAL 2 TIMES DAILY PRN
Qty: 400 G | Refills: 0
Start: 2019-08-23 | End: 2021-01-01

## 2019-08-23 RX ADMIN — Medication: at 09:03

## 2019-08-23 RX ADMIN — CLOTRIMAZOLE 1 APPLICATION: 10 CREAM TOPICAL at 09:04

## 2019-08-23 RX ADMIN — DOCUSATE SODIUM 100 MG: 100 CAPSULE, LIQUID FILLED ORAL at 09:04

## 2019-08-23 RX ADMIN — ENOXAPARIN SODIUM 40 MG: 40 INJECTION SUBCUTANEOUS at 09:04

## 2019-08-23 RX ADMIN — CEFAZOLIN SODIUM 2000 MG: 2 SOLUTION INTRAVENOUS at 06:36

## 2019-08-23 RX ADMIN — PANTOPRAZOLE SODIUM 40 MG: 40 TABLET, DELAYED RELEASE ORAL at 09:04

## 2019-08-23 RX ADMIN — BUPROPION HYDROCHLORIDE 300 MG: 150 TABLET, FILM COATED, EXTENDED RELEASE ORAL at 09:04

## 2019-08-23 RX ADMIN — BACLOFEN 10 MG: 10 TABLET ORAL at 09:04

## 2019-08-23 RX ADMIN — PREGABALIN 75 MG: 75 CAPSULE ORAL at 09:04

## 2019-08-23 RX ADMIN — MORPHINE SULFATE 2 MG: 2 INJECTION, SOLUTION INTRAMUSCULAR; INTRAVENOUS at 09:12

## 2019-08-23 RX ADMIN — POLYSACCHARIDE-IRON COMPLEX 150 MG: 150 CAPSULE ORAL at 09:09

## 2019-08-23 RX ADMIN — OXYCODONE HYDROCHLORIDE 5 MG: 5 TABLET ORAL at 06:41

## 2019-08-23 NOTE — DISCHARGE SUMMARY
Discharge- Leslie Barajas 1960, 61 y o  female MRN: [id number]    Unit/Bed#: [address] Encounter: [id number]    Primary Care Provider: Herve Montes De Oca DO   Date and time admitted to hospital: 8/21/2019  3:58 PM        * Cellulitis of bilateral lower extremities  Assessment & Plan  Patient presenting with 2 weeks of BLE swelling, redness, pain, peeling and weeping  Was in [place] 2 weeks ago and got sunburned with skin peeling, L > R  Has been on PO Abx as OP for 2 weeks with no improvement  No evidence of sepsis  · MRSA screen, negative   · Treated with IV Vancomycin then transitioned to IV Ancef  · Will discharge home on Keflex 500 mg q12h for 5 more days to complete a 7-day course of therapy  · Local wound care per wound care nurse recs   · Elevate legs  · Lac hydrin to BLE   · Follow-up in the wound care center    Iron deficiency anemia secondary to inadequate dietary iron intake  Assessment & Plan  Iron panel reviewed: Iron level 25   · Start Niferex with Colace daily     Opioid dependence ([facility] [address] )  Assessment & Plan  meds reviewed in     Fibromyalgia  Assessment & Plan  Continue pain meds as noted    Depression  Assessment & Plan  Continue home meds: zoloft, wellbutrin, abilify    Chronic bilateral low back pain without sciatica  Assessment & Plan   reviewed  Continue patients home meds: fentanyl, lyrica, oxycodone PRN        Discharging Physician / Practitioner: Jayashree Jackson  PCP: Herve Montes De Oca DO  Admission Date:   Admission Orders (From admission, onward)     Ordered        08/21/19 1742  Inpatient Admission  Once                   Discharge Date: 08/23/19    Resolved Problems  Date Reviewed: 8/23/2019    None          Consultations During Hospital Stay:  · Wound Care     Procedures Performed:   · Lower limb venous dopplers: No evidence of acute or chronic DVT       Significant Findings / Test Results:   · None    Incidental Findings:   · None     Test Results Pending at Discharge (will require follow up): · Final blood cultures      Outpatient Tests Requested:  · None    Complications:  None    Reason for Admission: Lower extremity cellulitis and superficial wounds provoked by recent sunburn     Hospital Course:     Trevor Monzon is a 61 y o  female patient PMH of chronic pain, fibromyalgia, depression and anxiety, chronic narcotic and benzo use who originally presented to the hospital on 8/21/2019 due to bilateral lower extremity swelling, redness, increased warmth and pain  She visited [place] 2 weeks prior to admission where her lower extremities were exposed to the sun  She saw a provider who started her on Bactrim, however, she noted no improvement  She was admitted for cellulitis and superficial wounds  She was initially managed on IV Vancomycin then transitioned for IV Ancef when her MRSA surveillance came back negative  On discharge she will transition to Keflex 500 mg PO q12h with local wound care and outpatient wound care and PCP follow-up  Additionally, patient's was started on an iron supplement and stool softener  Please see above list of diagnoses and related plan for additional information  Condition at Discharge: stable     Discharge Day Visit / Exam:     Subjective:  Overall, patient feels much improved  Reports improvement in lower extremity swelling and dryness  Wounds healing well  Less itching  Anxious for discharge home  Denies chest pain, palpitations, or shortness of breath  Vitals: Blood Pressure: 102/54 (08/23/19 0901)  Pulse: 64 (08/23/19 0901)  Temperature: 98 1 °F (36 7 °C) (08/23/19 0901)  Temp Source: Oral (08/23/19 0901)  Respirations: 18 (08/23/19 0901)  Height: 5' 2" (157 5 cm) (08/21/19 2104)  Weight - Scale: 96 4 kg (212 lb 8 4 oz) (08/23/19 0600)  SpO2: 100 % (08/23/19 0901)  Exam:   Physical Exam   Constitutional: She is oriented to person, place, and time  She appears well-developed  No distress  HENT:   Head: Normocephalic     Neck: Normal range of motion  Cardiovascular: Normal rate and regular rhythm  Pulmonary/Chest: Effort normal and breath sounds normal  No respiratory distress  She has no wheezes  She has no rhonchi  She has no rales  Abdominal: Soft  Bowel sounds are normal  She exhibits no distension  There is no tenderness  Musculoskeletal: Normal range of motion  She exhibits edema (+1 LLE, trace RLE)  Neurological: She is alert and oriented to person, place, and time  She has normal reflexes  Skin: Skin is warm and dry  She is not diaphoretic  BLE cellulitis with superficial wounds, L > R  No drainage  Psychiatric: Her speech is normal and behavior is normal  Her affect is labile  Nursing note and vitals reviewed  Discussion with Family: None    Discharge instructions/Information to patient and family:   See after visit summary for information provided to patient and family  Provisions for Follow-Up Care:  See after visit summary for information related to follow-up care and any pertinent home health orders  Disposition:     Home    For Discharges to [facility] SNF:   · Not Applicable to this Patient - Not Applicable to this Patient    Planned Readmission: None     Discharge Statement:  I spent > 30 minutes discharging the patient  This time was spent on the day of discharge  I had direct contact with the patient on the day of discharge  Greater than 50% of the total time was spent examining patient, answering all patient questions, arranging and discussing plan of care with patient as well as directly providing post-discharge instructions  Additional time then spent on discharge activities  Discharge Medications:  See after visit summary for reconciled discharge medications provided to patient and family        ** Please Note: This note has been constructed using a voice recognition system **

## 2019-08-23 NOTE — DISCHARGE INSTRUCTIONS
Lower Extremity Cellulitis:   Continue Keflex 500 mg every 12 hours   Follow-up in the [address] [place] [address] for a Wound Check    Iron Deficiency Anemia:   Start an iron supplement with a stool softener  Follow-up with PCP Yes

## 2019-08-23 NOTE — NURSING NOTE
Patient discharged to home  IV removed  All discharge instructions reviewed with patient  Wound nurse saw patient today as well

## 2019-08-23 NOTE — DISCHARGE INSTR - OTHER ORDERS
For bilat lower leg wounds: Cleanse with wound cleanser  Pat dry  Apply Xeroform gauze over wounds  Apply single layer  Cover with dry gauze and secure with dakotah and tape  Change once a day  May apply Lac-Hydrin lotion to intact skin on lower legs but not the open wounds  Change once a day and prn  May remove dressings, shower, and then re-apply dressings

## 2019-08-23 NOTE — CONSULTS
Consult Note - Wound   Gerson Cole 61 y o  female MRN: [id number]  Unit/Bed#: [address] [address] A Encounter: [id number]      Assessment:   Pt  States that she is not really clear how the lower leg wounds happened  Wounds on bilat lower legs started approximately 2 weeks ago  Tried oral antibiotics prior to hospital admission  Bilateral lower leg cellulitis left> right  Left lower wounds are circumferential  Scattered, small areas of pink, partial thickness skin loss  Also multiple large areas of scabbed wounds present  Left lower leg fiery red  Painful to touch  Right lower leg with pinkness to periwound  Scattered, small areas of pink, partial thickness skin loss  No scabbed areas on right lower leg  This leg is not painful  Did not appreciate lower extremity/pedal edema  Preparing for imminent discharge  Wound/Skin Care Plan:   1  Cleanse both legs with wound cleanser  Pat dry  2  Apply Xeroform gauze in a single layer fashion to wounds on both legs  3  Cover with dry gauze, ABD pads, dakotah and secure with tape  Change once a day and prn  4  Pt  To follow-up in the wound center  Pt  Called and left a message with the appointment line  5  Initial discharge wound care supplies provided  6  Patient and family watched me perform the wound care  Patient able to verbalize back how to change the dressings  Discussed with Amrita Seen, patient, and family  Vitals: Blood pressure 102/54, pulse 64, temperature 98 1 °F (36 7 °C), temperature source Oral, resp  rate 18, height 5' 2" (1 575 m), weight 96 4 kg (212 lb 8 4 oz), SpO2 100 %  ,Body mass index is 38 87 kg/m²          Wound 08/23/19 Pretibial Left (Active)   Wound Image    8/23/2019  2:11 PM       Wound 08/23/19 Pretibial Right (Active)   Wound Image   8/23/2019  2:12 PM

## 2019-08-23 NOTE — PLAN OF CARE
Problem: PAIN - ADULT  Goal: Verbalizes/displays adequate comfort level or baseline comfort level  Description  Interventions:  - Encourage patient to monitor pain and request assistance  - Assess pain using appropriate pain scale  - Administer analgesics based on type and severity of pain and evaluate response  - Implement non-pharmacological measures as appropriate and evaluate response  - Consider cultural and social influences on pain and pain management  - Notify physician/advanced practitioner if interventions unsuccessful or patient reports new pain  Outcome: Progressing     Problem: INFECTION - ADULT  Goal: Absence or prevention of progression during hospitalization  Description  INTERVENTIONS:  - Assess and monitor for signs and symptoms of infection  - Monitor lab/diagnostic results  - Monitor all insertion sites, i e  indwelling lines, tubes, and drains  - Monitor endotracheal if appropriate and nasal secretions for changes in amount and color  - [place] appropriate cooling/warming therapies per order  - Administer medications as ordered  - Instruct and encourage patient and family to use good hand hygiene technique  - Identify and instruct in appropriate isolation precautions for identified infection/condition  Outcome: Progressing     Problem: DISCHARGE PLANNING  Goal: Discharge to home or other facility with appropriate resources  Description  INTERVENTIONS:  - Identify barriers to discharge w/patient and caregiver  - Arrange for needed discharge resources and transportation as appropriate  - Identify discharge learning needs (meds, wound care, etc )  - Arrange for interpretive services to assist at discharge as needed  - Refer to Case Management Department for coordinating discharge planning if the patient needs post-hospital services based on physician/advanced practitioner order or complex needs related to functional status, cognitive ability, or social support system  Outcome: Progressing

## 2019-08-26 ENCOUNTER — TRANSITIONAL CARE MANAGEMENT (OUTPATIENT)
Dept: FAMILY MEDICINE CLINIC | Facility: CLINIC | Age: 59
End: 2019-08-26

## 2019-08-26 LAB
BACTERIA BLD CULT: NORMAL
BACTERIA BLD CULT: NORMAL

## 2019-08-26 NOTE — TELEPHONE ENCOUNTER
Patient called the RX line and left a message regarding this  Patient has appt coming up      Isa Dutta MA

## 2019-08-27 NOTE — UTILIZATION REVIEW
Notification of Discharge  This is a Notification of Discharge from our facility [address]  Please be advised that this patient has been discharge from our facility  Below you will find the admission and discharge date and time including the patients disposition  PRESENTATION DATE: 8/21/2019  3:58 PM  OBS ADMISSION DATE:   IP ADMISSION DATE: 8/21/19 1742   DISCHARGE DATE: 8/23/2019  2:57 PM  DISPOSITION: Home/Self Care Home/Self Care   Admission Orders listed below:  Admission Orders (From admission, onward)     Ordered        08/21/19 1742  Inpatient Admission  Once                   Please contact the UR Department if additional information is required to close this patient's authorization/case  [address]  Utilization Review Department  Phone: [phone]; Fax [phone]  [email or web address]  ATTENTION: Please call with any questions or concerns to [phone]  and carefully listen to the prompts so that you are directed to the right person  Send all requests for admission clinical reviews, approved or denied determinations and any other requests to fax [phone]   All voicemails are confidential

## 2019-08-28 ENCOUNTER — OFFICE VISIT (OUTPATIENT)
Dept: FAMILY MEDICINE CLINIC | Facility: CLINIC | Age: 59
End: 2019-08-28
Payer: COMMERCIAL

## 2019-08-28 ENCOUNTER — APPOINTMENT (OUTPATIENT)
Dept: WOUND CARE | Facility: HOSPITAL | Age: 59
End: 2019-08-28
Payer: COMMERCIAL

## 2019-08-28 VITALS
SYSTOLIC BLOOD PRESSURE: 122 MMHG | HEART RATE: 68 BPM | TEMPERATURE: 97.7 F | RESPIRATION RATE: 16 BRPM | BODY MASS INDEX: 39.05 KG/M2 | WEIGHT: 212.2 LBS | DIASTOLIC BLOOD PRESSURE: 66 MMHG | HEIGHT: 62 IN

## 2019-08-28 DIAGNOSIS — L03.116 CELLULITIS OF LEFT LOWER EXTREMITY: ICD-10-CM

## 2019-08-28 DIAGNOSIS — R60.0 BILATERAL LOWER EXTREMITY EDEMA: ICD-10-CM

## 2019-08-28 DIAGNOSIS — D50.8 IRON DEFICIENCY ANEMIA SECONDARY TO INADEQUATE DIETARY IRON INTAKE: Primary | ICD-10-CM

## 2019-08-28 DIAGNOSIS — L03.119 CELLULITIS OF LOWER EXTREMITY, UNSPECIFIED LATERALITY: ICD-10-CM

## 2019-08-28 PROCEDURE — 99213 OFFICE O/P EST LOW 20 MIN: CPT

## 2019-08-28 PROCEDURE — 99496 TRANSJ CARE MGMT HIGH F2F 7D: CPT | Performed by: FAMILY MEDICINE

## 2019-08-28 RX ORDER — IRON POLYSACCHARIDE COMPLEX 150 MG
150 CAPSULE ORAL DAILY
Qty: 90 CAPSULE | Refills: 1 | Status: SHIPPED | OUTPATIENT
Start: 2019-08-28 | End: 2021-01-01

## 2019-08-28 RX ORDER — FLUCONAZOLE 150 MG/1
150 TABLET ORAL ONCE
Qty: 1 TABLET | Refills: 1 | Status: SHIPPED | OUTPATIENT
Start: 2019-08-28 | End: 2019-08-28

## 2019-08-28 NOTE — TELEPHONE ENCOUNTER
Printed a letter in room for pt at her request  Please fax  Pt aware if any issues with letter for lucy, will defer to her PCP (at her request)  Please fax

## 2019-08-28 NOTE — PROGRESS NOTES
Assessment/Plan:    No problem-specific Assessment & Plan notes found for this encounter  I recommended 5 more days of keflex and ongoing skin care  Diflucan if needed  Stay on iron 1 pill/d for low iron sat 11% and mild anemia, recheck iron/cbc in 1m, may need long term iron 1/d due to hx of GBS  Copy of labs given to pt to show her surgeon who plans bariatric surgery/repair     Diagnoses and all orders for this visit:    Iron deficiency anemia secondary to inadequate dietary iron intake  -     iron polysaccharides (FERREX) 150 mg capsule; Take 1 capsule (150 mg total) by mouth daily  -     CBC and differential; Future  -     Iron Saturation %; Future    Bilateral lower extremity edema    Cellulitis of bilateral lower extremities  -     fluconazole (DIFLUCAN) 150 mg tablet; Take 1 tablet (150 mg total) by mouth once for 1 dose        "medically needs new scooter"      Return if symptoms worsen or fail to improve  Subjective:      Patient ID: Mynor Laura is a 61 y o  female  Chief Complaint   Patient presents with    Transition of Care Management     [facility]       HPI  On iron 1 on qd  Iron sat 11%  hgb 11 3 to 9 6 then 11 2 w/o transfusion  Still on keflex 500mg bid  5 d from dc  Not oozing  Still red  Going to wound care  No fever  Pain is better  Retired  No n/v/c/d  No constipation from iron 1/d  No periods  Hx of GBS    Scooter broken in [facility] Dr Sriram Quach to write letter certifying need  Pt states Dr Helm aware of her issues  Offered simple note today to see if acceptable but defer any further forms to Dr Sriram Quach, pt agreeable    The following portions of the patient's history were reviewed and updated as appropriate: allergies, current medications, past family history, past medical history, past social history, past surgical history and problem list     Review of Systems   Constitutional: Negative for fever  Respiratory: Negative for shortness of breath      Musculoskeletal: Positive for arthralgias  Current Outpatient Medications   Medication Sig Dispense Refill    ARIPiprazole (ABILIFY) 5 mg tablet Take 5 mg by mouth daily  0    baclofen 10 mg tablet Take 10 mg by mouth 2 (two) times a day  0    buPROPion (WELLBUTRIN XL) 300 mg 24 hr tablet daily      cephalexin (KEFLEX) 500 mg capsule Take 1 capsule (500 mg total) by mouth every 12 (twelve) hours for 5 days 10 capsule 0    clonazePAM (KlonoPIN) 0 5 mg tablet Take 0 5 mg by mouth 2 (two) times a day   ergocalciferol (VITAMIN D2) 50,000 units take 1 capsule by mouth every week  FOR 6 WEEKS  0    fentaNYL (DURAGESIC) 50 mcg/hr Place 1 patch on the skin every third day Max Daily Amount: 1 patch 10 patch 0    iron polysaccharides (FERREX) 150 mg capsule Take 1 capsule (150 mg total) by mouth daily 90 capsule 1    mupirocin (BACTROBAN) 2 % ointment Apply topically 3 (three) times a day 22 g 0    oxyCODONE (OXY-IR) 5 MG capsule Take 1 capsule (5 mg total) by mouth every 4 (four) hours as needed for moderate pain Max Daily Amount: 30 mg 90 capsule 0    pantoprazole (PROTONIX) 40 mg tablet Take 40 mg by mouth daily   pregabalin (LYRICA) 75 mg capsule Take 1 capsule (75 mg total) by mouth 3 (three) times a day 270 capsule 1    sertraline (ZOLOFT) 50 mg tablet Take 100 mg by mouth daily   0    ammonium lactate (LAC-HYDRIN) 12 % lotion Apply topically 2 (two) times a day as needed for dry skin (Patient not taking: Reported on 8/26/2019) 400 g 0    docusate sodium (COLACE) 100 mg capsule Take 1 capsule (100 mg total) by mouth daily (Patient not taking: Reported on 8/26/2019) 30 capsule 0    erythromycin (ILOTYCIN) ophthalmic ointment       fluconazole (DIFLUCAN) 150 mg tablet Take 1 tablet (150 mg total) by mouth once for 1 dose 1 tablet 1     No current facility-administered medications for this visit          Objective:    /66   Pulse 68   Temp 97 7 °F (36 5 °C)   Resp 16   Ht 5' 2" (1 575 m)   Wt 96 3 kg (212 lb 3 2 oz)   BMI 38 81 kg/m²        Physical Exam   Constitutional: She appears well-developed  No distress  HENT:   Head: Normocephalic  Right Ear: External ear normal    Left Ear: External ear normal    Mouth/Throat: Oropharynx is clear and moist  No oropharyngeal exudate  Eyes: Conjunctivae are normal  No scleral icterus  Neck: Neck supple  Cardiovascular: Normal rate, regular rhythm, normal heart sounds and intact distal pulses  Pulmonary/Chest: Effort normal  No respiratory distress  She has no wheezes  She has no rales  Abdominal: Soft  Bowel sounds are normal  She exhibits no distension  Musculoskeletal: She exhibits no edema or deformity  Neurological: She is alert  Skin: Skin is warm and dry  Rash noted  There is erythema  No open areas, superficial linear trace residual  Redness/induration w/o significant tenderness  No homans   Psychiatric: Her behavior is normal  Thought content normal    Nursing note and vitals reviewed               Ana Market, DO

## 2019-08-28 NOTE — LETTER
August 28, 2019     Patient: Paige Hammond   YOB: 1960   Date of Visit: 8/28/2019       To Whom it May Concern:    Flaco Ly is under my professional care  She was seen in my office on 8/28/2019  Due to her chronic pain, scoliosis, fibromyalgia and arthritis, she medically needs a power scooter  If you have any questions or concerns, please don't hesitate to call           Sincerely,          Tristen Montgomery DO        CC: No Recipients

## 2019-08-30 ENCOUNTER — TELEPHONE (OUTPATIENT)
Dept: FAMILY MEDICINE CLINIC | Facility: CLINIC | Age: 59
End: 2019-08-30

## 2019-08-30 NOTE — TELEPHONE ENCOUNTER
Antwon from [address] [place] [address] called and said they do not accept the patients insurance  And cannot provide a scooter for her  Amado Curtis said he would call the patient and make her aware

## 2019-08-30 NOTE — TELEPHONE ENCOUNTER
DR HAYWARD     Patients mother patricia hernandez has an appt scheduled for 9/09 and she wants to give it to [facility]  Please advise if that is ok to change

## 2019-08-30 NOTE — TELEPHONE ENCOUNTER
Spoke with Meghan Foster, she is aware that she is now scheduled for 9/9 at 11:30am and NOT Netta Lam    I did tell her that Netta Lam still needs to schedule her AWV, she stated she would relay the message    No further action needed

## 2019-09-04 ENCOUNTER — APPOINTMENT (OUTPATIENT)
Dept: WOUND CARE | Facility: HOSPITAL | Age: 59
End: 2019-09-04
Payer: COMMERCIAL

## 2019-09-04 PROCEDURE — 99212 OFFICE O/P EST SF 10 MIN: CPT

## 2019-09-06 ENCOUNTER — TRANSCRIBE ORDERS (OUTPATIENT)
Dept: ADMINISTRATIVE | Facility: HOSPITAL | Age: 59
End: 2019-09-06

## 2019-09-06 ENCOUNTER — APPOINTMENT (OUTPATIENT)
Dept: LAB | Facility: HOSPITAL | Age: 59
End: 2019-09-06
Payer: COMMERCIAL

## 2019-09-06 DIAGNOSIS — Z87.891 PERSONAL HISTORY OF TOBACCO USE, PRESENTING HAZARDS TO HEALTH: Primary | ICD-10-CM

## 2019-09-06 DIAGNOSIS — Z87.891 PERSONAL HISTORY OF TOBACCO USE, PRESENTING HAZARDS TO HEALTH: ICD-10-CM

## 2019-09-06 PROCEDURE — 80323 ALKALOIDS NOS: CPT

## 2019-09-06 PROCEDURE — 36415 COLL VENOUS BLD VENIPUNCTURE: CPT

## 2019-09-09 ENCOUNTER — OFFICE VISIT (OUTPATIENT)
Dept: FAMILY MEDICINE CLINIC | Facility: CLINIC | Age: 59
End: 2019-09-09
Payer: COMMERCIAL

## 2019-09-09 VITALS
RESPIRATION RATE: 16 BRPM | HEIGHT: 62 IN | HEART RATE: 84 BPM | WEIGHT: 215 LBS | TEMPERATURE: 97.5 F | DIASTOLIC BLOOD PRESSURE: 60 MMHG | SYSTOLIC BLOOD PRESSURE: 112 MMHG | BODY MASS INDEX: 39.56 KG/M2

## 2019-09-09 DIAGNOSIS — L03.116 CELLULITIS OF LEFT LOWER EXTREMITY: Primary | ICD-10-CM

## 2019-09-09 PROCEDURE — 99213 OFFICE O/P EST LOW 20 MIN: CPT | Performed by: FAMILY MEDICINE

## 2019-09-09 RX ORDER — CLINDAMYCIN HYDROCHLORIDE 300 MG/1
300 CAPSULE ORAL 4 TIMES DAILY
Qty: 40 CAPSULE | Refills: 0 | Status: SHIPPED | OUTPATIENT
Start: 2019-09-09 | End: 2019-09-19

## 2019-09-09 NOTE — PROGRESS NOTES
Assessment/Plan:    Problem List Items Addressed This Visit     Cellulitis of bilateral lower extremities - Primary    Relevant Medications    clindamycin (CLEOCIN) 300 MG capsule            There are no Patient Instructions on file for this visit  Return in about 1 week (around 9/16/2019) for Recheck  Subjective:      Patient ID: Carmen Adames is a 61 y o  female  Chief Complaint   Patient presents with    Follow-up     cellulitis--lj        She was in the hospital in [place] for cellulitis  She was seen in wound care locally  She has been keflex for a month  She has quit smoking and has not had a cigarettes for weeks  The following portions of the patient's history were reviewed and updated as appropriate:  past social history    Review of Systems   Constitutional: Negative for fever  Current Outpatient Medications   Medication Sig Dispense Refill    ammonium lactate (LAC-HYDRIN) 12 % lotion Apply topically 2 (two) times a day as needed for dry skin 400 g 0    ARIPiprazole (ABILIFY) 5 mg tablet Take 5 mg by mouth daily  0    baclofen 10 mg tablet Take 10 mg by mouth 2 (two) times a day  0    buPROPion (WELLBUTRIN XL) 300 mg 24 hr tablet daily      clonazePAM (KlonoPIN) 0 5 mg tablet Take 0 5 mg by mouth 2 (two) times a day   ergocalciferol (VITAMIN D2) 50,000 units take 1 capsule by mouth every week  FOR 6 WEEKS  0    fentaNYL (DURAGESIC) 50 mcg/hr Place 1 patch on the skin every third day Max Daily Amount: 1 patch 10 patch 0    iron polysaccharides (FERREX) 150 mg capsule Take 1 capsule (150 mg total) by mouth daily 90 capsule 1    pantoprazole (PROTONIX) 40 mg tablet Take 40 mg by mouth daily        pregabalin (LYRICA) 75 mg capsule Take 1 capsule (75 mg total) by mouth 3 (three) times a day 270 capsule 1    sertraline (ZOLOFT) 50 mg tablet Take 100 mg by mouth daily   0    clindamycin (CLEOCIN) 300 MG capsule Take 1 capsule (300 mg total) by mouth 4 (four) times a day for 10 days 40 capsule 0    mupirocin (BACTROBAN) 2 % ointment Apply topically 3 (three) times a day (Patient not taking: Reported on 9/9/2019) 22 g 0     No current facility-administered medications for this visit  Objective:    /60   Pulse 84   Temp 97 5 °F (36 4 °C)   Resp 16   Ht 5' 2" (1 575 m)   Wt 97 5 kg (215 lb)   BMI 39 32 kg/m²        Physical Exam   Constitutional: She appears well-developed and well-nourished  HENT:   Head: Normocephalic and atraumatic  Right Ear: External ear normal    Left Ear: External ear normal    Mouth/Throat: Oropharynx is clear and moist    Cardiovascular: Normal rate, regular rhythm and normal heart sounds  Exam reveals no friction rub  No murmur heard  Pulmonary/Chest: Effort normal and breath sounds normal  No respiratory distress  She has no wheezes  She has no rales  Musculoskeletal: She exhibits edema  She exhibits no deformity  Skin: Skin is warm  There is erythema  See picture   Nursing note and vitals reviewed                Alexander Fontaine DO

## 2019-09-11 LAB
COTININE SERPL-MCNC: 1.9 NG/ML
NICOTINE SERPL-MCNC: NORMAL NG/ML

## 2019-09-16 ENCOUNTER — OFFICE VISIT (OUTPATIENT)
Dept: FAMILY MEDICINE CLINIC | Facility: CLINIC | Age: 59
End: 2019-09-16
Payer: COMMERCIAL

## 2019-09-16 VITALS
HEIGHT: 62 IN | SYSTOLIC BLOOD PRESSURE: 106 MMHG | TEMPERATURE: 97.2 F | RESPIRATION RATE: 16 BRPM | DIASTOLIC BLOOD PRESSURE: 64 MMHG | HEART RATE: 84 BPM | BODY MASS INDEX: 39.2 KG/M2 | WEIGHT: 213 LBS

## 2019-09-16 DIAGNOSIS — L03.116 CELLULITIS OF LEFT LOWER EXTREMITY: Primary | ICD-10-CM

## 2019-09-16 DIAGNOSIS — R60.0 BILATERAL LOWER EXTREMITY EDEMA: ICD-10-CM

## 2019-09-16 PROCEDURE — 3008F BODY MASS INDEX DOCD: CPT | Performed by: FAMILY MEDICINE

## 2019-09-16 PROCEDURE — 99213 OFFICE O/P EST LOW 20 MIN: CPT | Performed by: FAMILY MEDICINE

## 2019-09-16 RX ORDER — POTASSIUM CHLORIDE 750 MG/1
10 TABLET, EXTENDED RELEASE ORAL DAILY PRN
Qty: 30 TABLET | Refills: 1 | Status: SHIPPED | OUTPATIENT
Start: 2019-09-16 | End: 2021-01-01

## 2019-09-16 RX ORDER — FUROSEMIDE 20 MG/1
20 TABLET ORAL DAILY PRN
Qty: 30 TABLET | Refills: 1 | Status: SHIPPED | OUTPATIENT
Start: 2019-09-16 | End: 2021-01-01 | Stop reason: HOSPADM

## 2019-09-16 NOTE — PROGRESS NOTES
Assessment/Plan:    1  Cellulitis of bilateral lower extremities  -     furosemide (LASIX) 20 mg tablet; Take 1 tablet (20 mg total) by mouth daily as needed (leg swelling)  -     potassium chloride (K-DUR,KLOR-CON) 10 mEq tablet; Take 1 tablet (10 mEq total) by mouth daily as needed (take with furosemide for leg swelling)    2  Bilateral lower extremity edema  -     furosemide (LASIX) 20 mg tablet; Take 1 tablet (20 mg total) by mouth daily as needed (leg swelling)  -     potassium chloride (K-DUR,KLOR-CON) 10 mEq tablet; Take 1 tablet (10 mEq total) by mouth daily as needed (take with furosemide for leg swelling)    suspect that her erythema may be related to swelling, will try diuretic  Risks and benefits of medication discussed  There are no Patient Instructions on file for this visit  Return in about 1 month (around 10/16/2019) for Next scheduled follow up  Subjective:      Patient ID: Deonna Britt is a 61 y o  female  Chief Complaint   Patient presents with    Follow-up     redness in legs--lj       She has 4 days left of the antibiotic  She had a death in the family since she was here and had to drive to [place]  Her legs are still red and swollen  The following portions of the patient's history were reviewed and updated as appropriate:  past social history    Review of Systems   Constitutional: Negative for fever           Current Outpatient Medications   Medication Sig Dispense Refill    ammonium lactate (LAC-HYDRIN) 12 % lotion Apply topically 2 (two) times a day as needed for dry skin 400 g 0    ARIPiprazole (ABILIFY) 5 mg tablet Take 5 mg by mouth daily  0    baclofen 10 mg tablet Take 10 mg by mouth 2 (two) times a day  0    buPROPion (WELLBUTRIN XL) 300 mg 24 hr tablet daily      clindamycin (CLEOCIN) 300 MG capsule Take 1 capsule (300 mg total) by mouth 4 (four) times a day for 10 days 40 capsule 0    clonazePAM (KlonoPIN) 0 5 mg tablet Take 0 5 mg by mouth 2 (two) times a day   ergocalciferol (VITAMIN D2) 50,000 units take 1 capsule by mouth every week  FOR 6 WEEKS  0    fentaNYL (DURAGESIC) 50 mcg/hr Place 1 patch on the skin every third day Max Daily Amount: 1 patch 10 patch 0    iron polysaccharides (FERREX) 150 mg capsule Take 1 capsule (150 mg total) by mouth daily 90 capsule 1    mupirocin (BACTROBAN) 2 % ointment Apply topically 3 (three) times a day 22 g 0    pantoprazole (PROTONIX) 40 mg tablet Take 40 mg by mouth daily   pregabalin (LYRICA) 75 mg capsule Take 1 capsule (75 mg total) by mouth 3 (three) times a day 270 capsule 1    sertraline (ZOLOFT) 50 mg tablet Take 100 mg by mouth daily   0    furosemide (LASIX) 20 mg tablet Take 1 tablet (20 mg total) by mouth daily as needed (leg swelling) 30 tablet 1    potassium chloride (K-DUR,KLOR-CON) 10 mEq tablet Take 1 tablet (10 mEq total) by mouth daily as needed (take with furosemide for leg swelling) 30 tablet 1     No current facility-administered medications for this visit  Objective:    /64   Pulse 84   Temp (!) 97 2 °F (36 2 °C)   Resp 16   Ht 5' 2" (1 575 m)   Wt 96 6 kg (213 lb)   BMI 38 96 kg/m²        Physical Exam   Constitutional: She appears well-developed and well-nourished  HENT:   Head: Normocephalic and atraumatic  Right Ear: External ear normal    Left Ear: External ear normal    Mouth/Throat: Oropharynx is clear and moist    Cardiovascular: Normal rate, regular rhythm and normal heart sounds  Exam reveals no friction rub  No murmur heard  Pulmonary/Chest: Effort normal and breath sounds normal  No respiratory distress  She has no wheezes  She has no rales  Musculoskeletal: She exhibits edema (bilateral lower legs)  She exhibits no deformity  Skin: There is erythema (bilateral lower legs, see picture)  Nursing note and vitals reviewed                 Valerio Alcaraz DO

## 2019-10-16 ENCOUNTER — TELEPHONE (OUTPATIENT)
Dept: FAMILY MEDICINE CLINIC | Facility: CLINIC | Age: 59
End: 2019-10-16

## 2019-10-16 NOTE — TELEPHONE ENCOUNTER
Pt came in for her apt but an hour late, she stated her apt was supposed to be at 6 but I told her it was 10 and she got mad and turned around and left fast before I had a chance to explain

## 2019-10-16 NOTE — TELEPHONE ENCOUNTER
Pt returned at 11:30 and asked for records release that she is going to transfer out because she can never get Dr Kailyn Cosme for her apts and this is ridiculous, I tried and Carlita Benson tried to reassure her but she was set on transferring out stating "I'm done"

## 2020-03-05 ENCOUNTER — APPOINTMENT (OUTPATIENT)
Dept: LAB | Facility: HOSPITAL | Age: 60
End: 2020-03-05
Attending: INTERNAL MEDICINE
Payer: COMMERCIAL

## 2020-03-05 ENCOUNTER — TRANSCRIBE ORDERS (OUTPATIENT)
Dept: ADMINISTRATIVE | Facility: HOSPITAL | Age: 60
End: 2020-03-05

## 2020-03-05 DIAGNOSIS — R19.7 DIARRHEA OF PRESUMED INFECTIOUS ORIGIN: Primary | ICD-10-CM

## 2020-03-05 DIAGNOSIS — R19.7 DIARRHEA OF PRESUMED INFECTIOUS ORIGIN: ICD-10-CM

## 2020-03-05 PROCEDURE — 87505 NFCT AGENT DETECTION GI: CPT

## 2020-03-05 PROCEDURE — 87493 C DIFF AMPLIFIED PROBE: CPT | Performed by: INTERNAL MEDICINE

## 2020-03-06 LAB
C DIFF TOX A+B STL QL IA: NEGATIVE
C DIFF TOX GENS STL QL NAA+PROBE: POSITIVE
CAMPYLOBACTER DNA SPEC NAA+PROBE: NORMAL
SALMONELLA DNA SPEC QL NAA+PROBE: NORMAL
SHIGA TOXIN STX GENE SPEC NAA+PROBE: NORMAL
SHIGELLA DNA SPEC QL NAA+PROBE: NORMAL

## 2020-04-13 ENCOUNTER — APPOINTMENT (OUTPATIENT)
Dept: LAB | Facility: CLINIC | Age: 60
End: 2020-04-13
Payer: COMMERCIAL

## 2020-04-13 ENCOUNTER — TRANSCRIBE ORDERS (OUTPATIENT)
Dept: LAB | Facility: CLINIC | Age: 60
End: 2020-04-13

## 2020-04-13 DIAGNOSIS — R19.7 DIARRHEA, UNSPECIFIED TYPE: Primary | ICD-10-CM

## 2020-04-13 DIAGNOSIS — R19.7 DIARRHEA, UNSPECIFIED TYPE: ICD-10-CM

## 2020-04-13 PROCEDURE — 87493 C DIFF AMPLIFIED PROBE: CPT

## 2020-04-14 LAB
C DIFF TOX A+B STL QL IA: NEGATIVE
C DIFF TOX GENS STL QL NAA+PROBE: POSITIVE

## 2020-06-26 ENCOUNTER — APPOINTMENT (OUTPATIENT)
Dept: LAB | Facility: CLINIC | Age: 60
End: 2020-06-26
Payer: COMMERCIAL

## 2020-06-26 ENCOUNTER — TRANSCRIBE ORDERS (OUTPATIENT)
Dept: LAB | Facility: CLINIC | Age: 60
End: 2020-06-26

## 2020-06-26 DIAGNOSIS — R19.7 DIARRHEA OF PRESUMED INFECTIOUS ORIGIN: Primary | ICD-10-CM

## 2020-06-26 PROCEDURE — 89055 LEUKOCYTE ASSESSMENT FECAL: CPT

## 2020-06-26 PROCEDURE — 87493 C DIFF AMPLIFIED PROBE: CPT

## 2020-06-26 PROCEDURE — 87177 OVA AND PARASITES SMEARS: CPT

## 2020-06-26 PROCEDURE — 87209 SMEAR COMPLEX STAIN: CPT

## 2020-06-27 LAB
C DIFF TOX A+B STL QL IA: NEGATIVE
C DIFF TOX GENS STL QL NAA+PROBE: POSITIVE

## 2020-07-01 LAB
O+P STL CONC: NORMAL
WBC SPEC QL GRAM STN: NORMAL

## 2020-07-02 DIAGNOSIS — F41.9 ANXIETY: Primary | ICD-10-CM

## 2020-07-02 RX ORDER — CLONAZEPAM 0.5 MG/1
0.5 TABLET ORAL 2 TIMES DAILY
Qty: 60 TABLET | Refills: 0 | Status: SHIPPED | OUTPATIENT
Start: 2020-07-02 | End: 2020-07-28 | Stop reason: SDUPTHER

## 2020-07-02 RX ORDER — ESZOPICLONE 2 MG/1
2 TABLET, FILM COATED ORAL
Qty: 30 TABLET | Refills: 0 | Status: SHIPPED | OUTPATIENT
Start: 2020-07-02 | End: 2020-07-28 | Stop reason: SDUPTHER

## 2020-07-02 NOTE — PROGRESS NOTES
Telephone call from [facility] requesting refill of medication Lunesta and Klonopin   Refills sent to pharmacy

## 2020-07-15 ENCOUNTER — OFFICE VISIT (OUTPATIENT)
Dept: PSYCHIATRY | Facility: CLINIC | Age: 60
End: 2020-07-15
Payer: COMMERCIAL

## 2020-07-15 DIAGNOSIS — F33.1 MAJOR DEPRESSIVE DISORDER, RECURRENT, MODERATE (HCC): Primary | ICD-10-CM

## 2020-07-15 PROCEDURE — 90833 PSYTX W PT W E/M 30 MIN: CPT | Performed by: NURSE PRACTITIONER

## 2020-07-15 PROCEDURE — 1036F TOBACCO NON-USER: CPT | Performed by: NURSE PRACTITIONER

## 2020-07-15 PROCEDURE — 99215 OFFICE O/P EST HI 40 MIN: CPT | Performed by: NURSE PRACTITIONER

## 2020-07-15 NOTE — PSYCH
Subjective:     Patient ID: Lazarus Christine is a 61 y o  female  She has a history of chronic back pain, anxiety and depression  Today we addressed coping with being Positive for CoVid 19  Asymptomatic  Appetite is good and sleep is fair  Anxiety has increased due to pain  Depression is increased, denies SI  Taking medications as prescribed  Denies SE     HPI ROS Appetite Changes and Sleep: normal appetite and decreased energy    Review Of Systems:     Mood Anxiety   Behavior anxious   Thought Content Normal   General Normal    Personality Normal   Other Psych Symptoms Normal   Constitutional chronic pain and weakness   ENT Normal   Cardiovascular Normal    Respiratory Normal    Gastrointestinal Normal   Genitourinary Normal    Musculoskeletal back pain   Integumentary Normal    Neurological Normal    Endocrine Normal    Other Symptoms Normal              Laboratory Results: No results found for this or any previous visit      Substance Abuse History:  Social History     Substance and Sexual Activity   Drug Use No       Family Psychiatric History:   Family History   Problem Relation Age of Onset    Hypertension Mother     Hypertension Father        reviewed current medications    Social History     Socioeconomic History    Marital status: /Civil Union     Spouse name: Not on file    Number of children: Not on file    Years of education: Not on file    Highest education level: Not on file   Occupational History    Not on file   Social Needs    Financial resource strain: Not on file    Food insecurity:     Worry: Not on file     Inability: Not on file    Transportation needs:     Medical: Not on file     Non-medical: Not on file   Tobacco Use    Smoking status: Former Smoker     Packs/day: 1 00     Years: 40 00     Pack years: 40 00     Types: Cigarettes     Last attempt to quit: 2019     Years since quittin 9    Smokeless tobacco: Never Used   Substance and Sexual Activity    Alcohol use: Not Currently    Drug use: No    Sexual activity: Not on file   Lifestyle    Physical activity:     Days per week: Not on file     Minutes per session: Not on file    Stress: Not on file   Relationships    Social connections:     Talks on phone: Not on file     Gets together: Not on file     Attends Gnosticism service: Not on file     Active member of club or organization: Not on file     Attends meetings of clubs or organizations: Not on file     Relationship status: Not on file    Intimate partner violence:     Fear of current or ex partner: Not on file     Emotionally abused: Not on file     Physically abused: Not on file     Forced sexual activity: Not on file   Other Topics Concern    Not on file   Social History Narrative    Not on file     Social History     Social History Narrative    Not on file       Objective:       Mental status:  Appearance restless and fidgety   Mood depressed   Affect affect was constricted   Speech a normal rate   Thought Processes normal thought processes   Hallucinations no hallucinations present    Thought Content no delusions   Abnormal Thoughts no suicidal thoughts    Orientation  oriented to person   Remote Memory short term memory intact and long term memory intact   Attention Span concentration intact   Intellect Appears to be of Average Intelligence   Insight Insight intact   Judgement judgment was intact   Muscle Strength Muscle strength and tone were normal   Language no difficulty naming common objects   Fund of Knowledge displays adequate knowledge of current events   Pain moderate to severe   Pain Scale 8       Assessment/Plan:       Diagnoses and all orders for this visit:    Major depressive disorder, recurrent, moderate ([facility] [address] )            Treatment Recommendations- Risks Benefits      Immediate Medical/Psychiatric/Psychotherapy Treatments and Any Precautions: reviewed current medications encouraged to call pain management    Risks, Benefits And Possible Side Effects Of Medications:  denies    Controlled Medication Discussion: Discussed with patient Black Box warning on concurrent use of benzodiazepines and opioid medications including sedation, respiratory depression, coma and death  Patient understands the risk of treatment with benzodiazepines in addition to opioids and wants to continue taking those medications        Psychotherapy Provided: Individual psychotherapy provided and medication management    Goals discussed in session: coping with anxiety related to pain    Counseling provided: 40

## 2020-07-15 NOTE — BH TREATMENT PLAN
TREATMENT PLAN (Medication Management Only)        [facility]    Name and Date of Birth:  Misael Goncalves 61 y o  1960  Date of Treatment Plan: July 15, 2020  Diagnosis/Diagnoses:    1  Major depressive disorder, recurrent, moderate (HCC)      Strengths/Personal Resources for Self-Care: supportive family, taking medications as prescribed, ability to communicate needs, ability to listen  Area/Areas of need (in own words): anxiety symptoms, depressive symptoms  1  Long Term Goal: improve anxiety and depressive symptoms  Target Date: 2 months - 9/15/2020  Person/Persons responsible for completion of goal: Ranjeet and provider  2  Short Term Objective (s) - How will we reach this goal?:   A  Provider new recommended medication/dosage changes and/or continue medication(s): continue current medications as prescribed  B  coping with chronic pain  C  reframing automatic negative thoughts  Target Date: 3 months - 10/15/2020  Person/Persons Responsible for Completion of Goal: Hafsa and provider  Progress Towards Goals: progressing slowly  Treatment Modality: medication management every 1 month  Review due 90 to 120 days from date of this plan: 6 months - 01/15/2021  Expected length of service: maintenance  My Physician/PA/NP and I have developed this plan together and I agree to work on the goals and objectives  I understand the treatment goals that were developed for my treatment

## 2020-07-25 ENCOUNTER — TELEPHONE (OUTPATIENT)
Dept: PSYCHIATRY | Facility: CLINIC | Age: 60
End: 2020-07-25

## 2020-07-25 NOTE — TELEPHONE ENCOUNTER
Telephone call to Amy Moreno, upset due to not getting her pain injections in her back  They increased her Fentanyl patch  Support provided, encouraged her to call pain management on Monday

## 2020-07-28 DIAGNOSIS — F41.9 ANXIETY: ICD-10-CM

## 2020-07-28 RX ORDER — CLONAZEPAM 0.5 MG/1
0.5 TABLET ORAL 2 TIMES DAILY
Qty: 60 TABLET | Refills: 0 | Status: SHIPPED | OUTPATIENT
Start: 2020-07-28 | End: 2020-09-01 | Stop reason: SDUPTHER

## 2020-07-28 RX ORDER — ESZOPICLONE 2 MG/1
2 TABLET, FILM COATED ORAL
Qty: 30 TABLET | Refills: 0 | Status: SHIPPED | OUTPATIENT
Start: 2020-07-28 | End: 2020-11-18 | Stop reason: ALTCHOICE

## 2020-08-06 ENCOUNTER — OFFICE VISIT (OUTPATIENT)
Dept: PSYCHIATRY | Facility: CLINIC | Age: 60
End: 2020-08-06
Payer: COMMERCIAL

## 2020-08-06 VITALS
WEIGHT: 195 LBS | SYSTOLIC BLOOD PRESSURE: 84 MMHG | HEART RATE: 78 BPM | DIASTOLIC BLOOD PRESSURE: 47 MMHG | BODY MASS INDEX: 35.67 KG/M2

## 2020-08-06 DIAGNOSIS — F32.1 CURRENT MODERATE EPISODE OF MAJOR DEPRESSIVE DISORDER WITHOUT PRIOR EPISODE (HCC): Primary | ICD-10-CM

## 2020-08-06 PROCEDURE — 99214 OFFICE O/P EST MOD 30 MIN: CPT | Performed by: NURSE PRACTITIONER

## 2020-08-06 PROCEDURE — 90836 PSYTX W PT W E/M 45 MIN: CPT | Performed by: NURSE PRACTITIONER

## 2020-08-06 NOTE — PSYCH
Subjective:     Patient ID: Raisa Stauffer is a 61 y o  female  Has chronic pain from disc degeneration  She is over due for injections and is depressed  Reports she can't sleep due to pain and the pain is making the depression worse  She is due to drive to [place] in Sept   To look for a home  Takes medication as ordered  Family are fairly supportive  Side effects of fatigue are reported  anxious     HPI ROS Appetite Changes and Sleep: normal appetite    Review Of Systems:     Mood Anxiety and Depression   Behavior anxious   Thought Content Normal   General Sleep Disturbances   Personality Normal   Other Psych Symptoms Normal   Constitutional Feeling Poorly and Feeling Tired   ENT As Noted in HPI   Cardiovascular As Noted in HPI   Respiratory As Noted in HPI   Gastrointestinal As Noted in HPI   Genitourinary As Noted in HPI   Musculoskeletal As Noted in HPI   Integumentary As Noted in HPI   Neurological As Noted in HPI   Endocrine Normal    Other Symptoms Normal              Laboratory Results: No results found for this or any previous visit      Substance Abuse History:  Social History     Substance and Sexual Activity   Drug Use No       Family Psychiatric History:   Family History   Problem Relation Age of Onset    Hypertension Mother     Hypertension Father        reviewed medications    Social History     Socioeconomic History    Marital status: /Civil Union     Spouse name: Not on file    Number of children: Not on file    Years of education: Not on file    Highest education level: Not on file   Occupational History    Not on file   Social Needs    Financial resource strain: Not on file    Food insecurity     Worry: Not on file     Inability: Not on file   [employer] needs     Medical: Not on file     Non-medical: Not on file   Tobacco Use    Smoking status: Former Smoker     Packs/day: 1 00     Years: 40 00     Pack years: 40 00     Types: Cigarettes     Last attempt to quit: 2019     Years since quittin 9    Smokeless tobacco: Never Used   Substance and Sexual Activity    Alcohol use: Not Currently    Drug use: No    Sexual activity: Not on file   Lifestyle    Physical activity     Days per week: Not on file     Minutes per session: Not on file    Stress: Not on file   Relationships    Social connections     Talks on phone: Not on file     Gets together: Not on file     Attends Buddhism service: Not on file     Active member of club or organization: Not on file     Attends meetings of clubs or organizations: Not on file     Relationship status: Not on file    Intimate partner violence     Fear of current or ex partner: Not on file     Emotionally abused: Not on file     Physically abused: Not on file     Forced sexual activity: Not on file   Other Topics Concern    Not on file   Social History Narrative    Not on file     Social History     Social History Narrative    Not on file       Objective:       Mental status:  Appearance restless and fidgety   Mood depressed   Affect affect was tearful   Speech a normal rate   Thought Processes normal thought processes   Hallucinations no hallucinations present    Thought Content no delusions   Abnormal Thoughts no suicidal thoughts  and no homicidal thoughts    Orientation  oriented to person and place and time   Remote Memory short term memory intact and long term memory intact   Attention Span concentration intact   Intellect Appears to be of Average Intelligence   Insight Insight intact   Judgement judgment was intact   Muscle Strength Abnormal gait   Language no difficulty naming common objects   Fund of Knowledge displays adequate knowledge of current events   Pain moderate to severe   Pain Scale 8       Assessment/Plan:       Diagnoses and all orders for this visit:    Current moderate episode of major depressive disorder without prior episode [facility]            Treatment Recommendations- Risks Benefits      Immediate Medical/Psychiatric/Psychotherapy Treatments and Any Precautions: Discussed coping with pain and depression  Spoke about possible spinal stimulator  She will follow up      Controlled Medication Discussion: Discussed with patient Black Box warning on concurrent use of benzodiazepines and opioid medications including sedation, respiratory depression, coma and death  Patient understands the risk of treatment with benzodiazepines in addition to opioids and wants to continue taking those medications  and Discussed with patient the risks of sedation, respiratory depression, impairment of ability to drive and potential for abuse and addiction related to treatment with benzodiazepine medications  The patient understands risk of treatment with benzodiazepine medications, agrees to not drive if feels impaired and agrees to take medications as prescribed  Psychotherapy Provided: Individual psychotherapy provided   Coping skills discussed    Goals discussed in session:stable mood    Counseling provided: 45 min

## 2020-08-19 ENCOUNTER — OFFICE VISIT (OUTPATIENT)
Dept: PSYCHIATRY | Facility: CLINIC | Age: 60
End: 2020-08-19
Payer: COMMERCIAL

## 2020-08-19 VITALS
SYSTOLIC BLOOD PRESSURE: 115 MMHG | WEIGHT: 196 LBS | HEART RATE: 62 BPM | HEIGHT: 62 IN | DIASTOLIC BLOOD PRESSURE: 63 MMHG | BODY MASS INDEX: 36.07 KG/M2

## 2020-08-19 DIAGNOSIS — F32.1 CURRENT MODERATE EPISODE OF MAJOR DEPRESSIVE DISORDER WITHOUT PRIOR EPISODE (HCC): Primary | ICD-10-CM

## 2020-08-19 PROCEDURE — 99212 OFFICE O/P EST SF 10 MIN: CPT | Performed by: NURSE PRACTITIONER

## 2020-08-19 PROCEDURE — 3008F BODY MASS INDEX DOCD: CPT | Performed by: NURSE PRACTITIONER

## 2020-08-19 PROCEDURE — 90833 PSYTX W PT W E/M 30 MIN: CPT | Performed by: NURSE PRACTITIONER

## 2020-08-19 NOTE — PSYCH
Subjective: Feeling much better after I received shots in my back for the pain  Looking forward to moving to [place]     Patient ID: Lee Alcantar is a 61 y o  female  History of anxiety and depression possibly due to chronic arthritic pain  Appetite and sleep are good  Takes medication as prescribed  Denies depression or SI  Family are supportive  Seen for medication management  HPI ROS Appetite Changes and Sleep: normal appetite and normal energy level    Review Of Systems:     Mood Normal   Behavior Normal    Thought Content Normal   General Normal    Personality Normal   Other Psych Symptoms Normal   Constitutional Feeling Tired   ENT As Noted in HPI   Cardiovascular As Noted in HPI   Respiratory As Noted in HPI   Gastrointestinal As Noted in HPI   Genitourinary As Noted in HPI   Musculoskeletal As Noted in HPI   Integumentary As Noted in HPI   Neurological Normal    Endocrine Normal    Other Symptoms Normal              Laboratory Results: No results found for this or any previous visit      Substance Abuse History:  Social History     Substance and Sexual Activity   Drug Use No       Family Psychiatric History:   Family History   Problem Relation Age of Onset    Hypertension Mother     Hypertension Father        reviewed medications    Social History     Socioeconomic History    Marital status: /Civil Union     Spouse name: Not on file    Number of children: Not on file    Years of education: Not on file    Highest education level: Not on file   Occupational History    Not on file   Social Needs    Financial resource strain: Not on file    Food insecurity     Worry: Not on file     Inability: Not on file   [email or web address] needs     Medical: Not on file     Non-medical: Not on file   Tobacco Use    Smoking status: Former Smoker     Packs/day: 1 00     Years: 40 00     Pack years: 40 00     Types: Cigarettes     Last attempt to quit: 2019     Years since quittin 0    Smokeless tobacco: Never Used   Substance and Sexual Activity    Alcohol use: Not Currently    Drug use: No    Sexual activity: Not on file   Lifestyle    Physical activity     Days per week: Not on file     Minutes per session: Not on file    Stress: Not on file   Relationships    Social connections     Talks on phone: Not on file     Gets together: Not on file     Attends [employer] service: Not on file     Active member of club or organization: Not on file     Attends meetings of clubs or organizations: Not on file     Relationship status: Not on file    Intimate partner violence     Fear of current or ex partner: Not on file     Emotionally abused: Not on file     Physically abused: Not on file     Forced sexual activity: Not on file   Other Topics Concern    Not on file   Social History Narrative    Not on file     Social History     Social History Narrative    Not on file       Objective:       Mental status:  Appearance calm and cooperative    Mood euthymic   Affect affect appropriate    Speech a normal rate   Thought Processes normal thought processes   Hallucinations no hallucinations present    Thought Content no delusions   Abnormal Thoughts no suicidal thoughts  and no homicidal thoughts    Orientation  oriented to person and place and time   Remote Memory short term memory intact and long term memory intact   Attention Span concentration intact   Intellect Appears to be of Average Intelligence   Insight Insight intact   Judgement judgment was intact   Muscle Strength Abnormal gait   Language no difficulty naming common objects   Fund of Knowledge displays adequate knowledge of current events   Pain moderate to severe   Pain Scale 5       Assessment/Plan:       Diagnoses and all orders for this visit:    Current moderate episode of major depressive disorder without prior episode [facility])            Treatment Recommendations- Risks Benefits      Immediate Medical/Psychiatric/Psychotherapy Treatments and Any Precautions: Support provided, discussed obtaining appointments now for when they move to [place]  Risks, Benefits And Possible Side Effects Of Medications:  {PSYCH RISK, BENEFITS AND POSSIBLE SIDE EFFECTS (Optional):42558    Controlled Medication Discussion: Discussed with patient the risks of sedation, respiratory depression, impairment of ability to drive and potential for abuse and addiction related to treatment with benzodiazepine medications  The patient understands risk of treatment with benzodiazepine medications, agrees to not drive if feels impaired and agrees to take medications as prescribed  Psychotherapy Provided: Individual psychotherapy provided       Goals discussed in session: stable mood    Counseling provided: 30 min

## 2020-09-01 DIAGNOSIS — F41.9 ANXIETY: ICD-10-CM

## 2020-09-01 DIAGNOSIS — F32.A DEPRESSION, UNSPECIFIED DEPRESSION TYPE: Primary | ICD-10-CM

## 2020-09-01 RX ORDER — CLONAZEPAM 0.5 MG/1
0.5 TABLET ORAL 2 TIMES DAILY
Qty: 60 TABLET | Refills: 0 | Status: SHIPPED | OUTPATIENT
Start: 2020-09-01 | End: 2020-10-14 | Stop reason: SDUPTHER

## 2020-09-10 DIAGNOSIS — F32.A DEPRESSION, UNSPECIFIED DEPRESSION TYPE: ICD-10-CM

## 2020-09-16 PROBLEM — F41.9 ANXIETY DISORDER: Status: ACTIVE | Noted: 2020-09-16

## 2020-09-18 ENCOUNTER — APPOINTMENT (OUTPATIENT)
Dept: LAB | Facility: CLINIC | Age: 60
End: 2020-09-18
Payer: COMMERCIAL

## 2020-09-18 ENCOUNTER — TRANSCRIBE ORDERS (OUTPATIENT)
Dept: LAB | Facility: CLINIC | Age: 60
End: 2020-09-18

## 2020-09-18 DIAGNOSIS — R79.89 HYPOURICEMIA: ICD-10-CM

## 2020-09-18 DIAGNOSIS — Z13.220 SCREENING FOR LIPOID DISORDERS: ICD-10-CM

## 2020-09-18 DIAGNOSIS — S27.809A RUPTURE OF DIAPHRAGM: ICD-10-CM

## 2020-09-18 DIAGNOSIS — D64.9 ANEMIA, UNSPECIFIED TYPE: Primary | ICD-10-CM

## 2020-09-18 LAB
25(OH)D3 SERPL-MCNC: 10.9 NG/ML (ref 30–100)
ALBUMIN SERPL BCP-MCNC: 3.7 G/DL (ref 3.5–5)
ALP SERPL-CCNC: 106 U/L (ref 46–116)
ALT SERPL W P-5'-P-CCNC: 23 U/L (ref 12–78)
ANION GAP SERPL CALCULATED.3IONS-SCNC: 3 MMOL/L (ref 4–13)
AST SERPL W P-5'-P-CCNC: 14 U/L (ref 5–45)
BASOPHILS # BLD AUTO: 0.02 THOUSANDS/ΜL (ref 0–0.1)
BASOPHILS NFR BLD AUTO: 0 % (ref 0–1)
BILIRUB SERPL-MCNC: 0.38 MG/DL (ref 0.2–1)
BUN SERPL-MCNC: 15 MG/DL (ref 5–25)
CALCIUM SERPL-MCNC: 9.1 MG/DL (ref 8.3–10.1)
CHLORIDE SERPL-SCNC: 102 MMOL/L (ref 100–108)
CHOLEST SERPL-MCNC: 136 MG/DL (ref 50–200)
CO2 SERPL-SCNC: 32 MMOL/L (ref 21–32)
CREAT SERPL-MCNC: 0.88 MG/DL (ref 0.6–1.3)
EOSINOPHIL # BLD AUTO: 0.02 THOUSAND/ΜL (ref 0–0.61)
EOSINOPHIL NFR BLD AUTO: 0 % (ref 0–6)
ERYTHROCYTE [DISTWIDTH] IN BLOOD BY AUTOMATED COUNT: 17.8 % (ref 11.6–15.1)
GFR SERPL CREATININE-BSD FRML MDRD: 72 ML/MIN/1.73SQ M
GLUCOSE P FAST SERPL-MCNC: 93 MG/DL (ref 65–99)
HCT VFR BLD AUTO: 36.4 % (ref 34.8–46.1)
HDLC SERPL-MCNC: 98 MG/DL
HGB BLD-MCNC: 11.7 G/DL (ref 11.5–15.4)
IMM GRANULOCYTES # BLD AUTO: 0.03 THOUSAND/UL (ref 0–0.2)
IMM GRANULOCYTES NFR BLD AUTO: 0 % (ref 0–2)
LDLC SERPL CALC-MCNC: 30 MG/DL (ref 0–100)
LYMPHOCYTES # BLD AUTO: 0.92 THOUSANDS/ΜL (ref 0.6–4.47)
LYMPHOCYTES NFR BLD AUTO: 12 % (ref 14–44)
MCH RBC QN AUTO: 28.5 PG (ref 26.8–34.3)
MCHC RBC AUTO-ENTMCNC: 32.1 G/DL (ref 31.4–37.4)
MCV RBC AUTO: 89 FL (ref 82–98)
MONOCYTES # BLD AUTO: 0.47 THOUSAND/ΜL (ref 0.17–1.22)
MONOCYTES NFR BLD AUTO: 6 % (ref 4–12)
NEUTROPHILS # BLD AUTO: 6.36 THOUSANDS/ΜL (ref 1.85–7.62)
NEUTS SEG NFR BLD AUTO: 82 % (ref 43–75)
NONHDLC SERPL-MCNC: 38 MG/DL
NRBC BLD AUTO-RTO: 0 /100 WBCS
PLATELET # BLD AUTO: 244 THOUSANDS/UL (ref 149–390)
PMV BLD AUTO: 12 FL (ref 8.9–12.7)
POTASSIUM SERPL-SCNC: 4.2 MMOL/L (ref 3.5–5.3)
PROT SERPL-MCNC: 6.9 G/DL (ref 6.4–8.2)
RBC # BLD AUTO: 4.1 MILLION/UL (ref 3.81–5.12)
SODIUM SERPL-SCNC: 137 MMOL/L (ref 136–145)
T4 FREE SERPL-MCNC: 1.03 NG/DL (ref 0.76–1.46)
TRIGL SERPL-MCNC: 41 MG/DL
TSH SERPL DL<=0.05 MIU/L-ACNC: 0.91 UIU/ML (ref 0.36–3.74)
WBC # BLD AUTO: 7.82 THOUSAND/UL (ref 4.31–10.16)

## 2020-09-18 PROCEDURE — 80061 LIPID PANEL: CPT

## 2020-09-18 PROCEDURE — 85025 COMPLETE CBC W/AUTO DIFF WBC: CPT

## 2020-09-18 PROCEDURE — 84443 ASSAY THYROID STIM HORMONE: CPT

## 2020-09-18 PROCEDURE — 82306 VITAMIN D 25 HYDROXY: CPT

## 2020-09-18 PROCEDURE — 84439 ASSAY OF FREE THYROXINE: CPT

## 2020-09-18 PROCEDURE — 80053 COMPREHEN METABOLIC PANEL: CPT

## 2020-09-18 PROCEDURE — 36415 COLL VENOUS BLD VENIPUNCTURE: CPT

## 2020-10-14 ENCOUNTER — OFFICE VISIT (OUTPATIENT)
Dept: PSYCHIATRY | Facility: CLINIC | Age: 60
End: 2020-10-14
Payer: COMMERCIAL

## 2020-10-14 VITALS
WEIGHT: 195.2 LBS | HEART RATE: 76 BPM | SYSTOLIC BLOOD PRESSURE: 118 MMHG | DIASTOLIC BLOOD PRESSURE: 77 MMHG | BODY MASS INDEX: 35.7 KG/M2

## 2020-10-14 DIAGNOSIS — F33.1 MAJOR DEPRESSIVE DISORDER, RECURRENT, MODERATE (HCC): Primary | ICD-10-CM

## 2020-10-14 DIAGNOSIS — E55.9 VITAMIN D DEFICIENCY: ICD-10-CM

## 2020-10-14 DIAGNOSIS — F41.9 ANXIETY: ICD-10-CM

## 2020-10-14 DIAGNOSIS — F41.9 ANXIETY DISORDER, UNSPECIFIED TYPE: ICD-10-CM

## 2020-10-14 PROCEDURE — 90833 PSYTX W PT W E/M 30 MIN: CPT | Performed by: NURSE PRACTITIONER

## 2020-10-14 PROCEDURE — 99213 OFFICE O/P EST LOW 20 MIN: CPT | Performed by: NURSE PRACTITIONER

## 2020-10-14 RX ORDER — ERGOCALCIFEROL 1.25 MG/1
50000 CAPSULE ORAL WEEKLY
Qty: 12 CAPSULE | Refills: 0 | Status: SHIPPED | OUTPATIENT
Start: 2020-10-14 | End: 2021-01-01

## 2020-10-14 RX ORDER — CLONAZEPAM 0.5 MG/1
0.5 TABLET ORAL 2 TIMES DAILY
Qty: 60 TABLET | Refills: 0 | Status: SHIPPED | OUTPATIENT
Start: 2020-10-14 | End: 2021-01-01 | Stop reason: SDUPTHER

## 2020-11-11 ENCOUNTER — TRANSCRIBE ORDERS (OUTPATIENT)
Dept: ADMINISTRATIVE | Facility: HOSPITAL | Age: 60
End: 2020-11-11

## 2020-11-11 DIAGNOSIS — R60.0 LOCALIZED EDEMA: Primary | ICD-10-CM

## 2020-11-16 ENCOUNTER — LAB (OUTPATIENT)
Dept: LAB | Facility: CLINIC | Age: 60
End: 2020-11-16
Payer: COMMERCIAL

## 2020-11-16 ENCOUNTER — TRANSCRIBE ORDERS (OUTPATIENT)
Dept: LAB | Facility: CLINIC | Age: 60
End: 2020-11-16

## 2020-11-16 DIAGNOSIS — R79.89 ABNORMAL CBC: Primary | ICD-10-CM

## 2020-11-16 DIAGNOSIS — R79.89 ABNORMAL CBC: ICD-10-CM

## 2020-11-16 LAB
BASOPHILS # BLD AUTO: 0.02 THOUSANDS/ΜL (ref 0–0.1)
BASOPHILS NFR BLD AUTO: 0 % (ref 0–1)
EOSINOPHIL # BLD AUTO: 0.01 THOUSAND/ΜL (ref 0–0.61)
EOSINOPHIL NFR BLD AUTO: 0 % (ref 0–6)
ERYTHROCYTE [DISTWIDTH] IN BLOOD BY AUTOMATED COUNT: 15.9 % (ref 11.6–15.1)
HCT VFR BLD AUTO: 32.9 % (ref 34.8–46.1)
HGB BLD-MCNC: 10 G/DL (ref 11.5–15.4)
IMM GRANULOCYTES # BLD AUTO: 0.1 THOUSAND/UL (ref 0–0.2)
IMM GRANULOCYTES NFR BLD AUTO: 1 % (ref 0–2)
LYMPHOCYTES # BLD AUTO: 2.02 THOUSANDS/ΜL (ref 0.6–4.47)
LYMPHOCYTES NFR BLD AUTO: 21 % (ref 14–44)
MCH RBC QN AUTO: 27.5 PG (ref 26.8–34.3)
MCHC RBC AUTO-ENTMCNC: 30.4 G/DL (ref 31.4–37.4)
MCV RBC AUTO: 90 FL (ref 82–98)
MONOCYTES # BLD AUTO: 0.6 THOUSAND/ΜL (ref 0.17–1.22)
MONOCYTES NFR BLD AUTO: 6 % (ref 4–12)
NEUTROPHILS # BLD AUTO: 7.01 THOUSANDS/ΜL (ref 1.85–7.62)
NEUTS SEG NFR BLD AUTO: 72 % (ref 43–75)
NRBC BLD AUTO-RTO: 0 /100 WBCS
PLATELET # BLD AUTO: 358 THOUSANDS/UL (ref 149–390)
PMV BLD AUTO: 10.4 FL (ref 8.9–12.7)
RBC # BLD AUTO: 3.64 MILLION/UL (ref 3.81–5.12)
WBC # BLD AUTO: 9.76 THOUSAND/UL (ref 4.31–10.16)

## 2020-11-16 PROCEDURE — 85025 COMPLETE CBC W/AUTO DIFF WBC: CPT

## 2020-11-16 PROCEDURE — 36415 COLL VENOUS BLD VENIPUNCTURE: CPT

## 2020-11-18 ENCOUNTER — TELEMEDICINE (OUTPATIENT)
Dept: PSYCHIATRY | Facility: CLINIC | Age: 60
End: 2020-11-18
Payer: COMMERCIAL

## 2020-11-18 DIAGNOSIS — F33.1 MAJOR DEPRESSIVE DISORDER, RECURRENT, MODERATE (HCC): Primary | ICD-10-CM

## 2020-11-18 DIAGNOSIS — F41.9 ANXIETY DISORDER, UNSPECIFIED TYPE: ICD-10-CM

## 2020-11-18 DIAGNOSIS — Z98.84 HISTORY OF GASTRIC BYPASS: ICD-10-CM

## 2020-11-18 PROCEDURE — 99213 OFFICE O/P EST LOW 20 MIN: CPT | Performed by: NURSE PRACTITIONER

## 2020-11-18 PROCEDURE — 90833 PSYTX W PT W E/M 30 MIN: CPT | Performed by: NURSE PRACTITIONER

## 2020-11-18 RX ORDER — SERTRALINE HYDROCHLORIDE 100 MG/1
200 TABLET, FILM COATED ORAL DAILY
Qty: 180 TABLET | Refills: 0 | Status: SHIPPED | OUTPATIENT
Start: 2020-11-18 | End: 2021-01-01 | Stop reason: SDUPTHER

## 2020-11-20 ENCOUNTER — CONSULT (OUTPATIENT)
Dept: CARDIOLOGY CLINIC | Facility: CLINIC | Age: 60
End: 2020-11-20
Payer: COMMERCIAL

## 2020-11-20 VITALS
HEIGHT: 62 IN | WEIGHT: 199.7 LBS | OXYGEN SATURATION: 99 % | DIASTOLIC BLOOD PRESSURE: 60 MMHG | SYSTOLIC BLOOD PRESSURE: 120 MMHG | TEMPERATURE: 98.7 F | RESPIRATION RATE: 18 BRPM | HEART RATE: 59 BPM | BODY MASS INDEX: 36.75 KG/M2

## 2020-11-20 DIAGNOSIS — R60.0 BILATERAL LOWER EXTREMITY EDEMA: Primary | ICD-10-CM

## 2020-11-20 DIAGNOSIS — I87.2 DEEP VENOUS INSUFFICIENCY: ICD-10-CM

## 2020-11-20 PROCEDURE — 3008F BODY MASS INDEX DOCD: CPT | Performed by: INTERNAL MEDICINE

## 2020-11-20 PROCEDURE — 93000 ELECTROCARDIOGRAM COMPLETE: CPT | Performed by: INTERNAL MEDICINE

## 2020-11-20 PROCEDURE — 99215 OFFICE O/P EST HI 40 MIN: CPT | Performed by: INTERNAL MEDICINE

## 2020-11-20 PROCEDURE — 1036F TOBACCO NON-USER: CPT | Performed by: INTERNAL MEDICINE

## 2020-11-20 RX ORDER — OXYCODONE HYDROCHLORIDE 15 MG/1
TABLET ORAL
COMMUNITY
Start: 2020-11-12 | End: 2021-01-01 | Stop reason: HOSPADM

## 2020-11-27 ENCOUNTER — HOSPITAL ENCOUNTER (OUTPATIENT)
Dept: RADIOLOGY | Facility: HOSPITAL | Age: 60
Discharge: HOME | End: 2020-11-27
Attending: INTERNAL MEDICINE
Payer: COMMERCIAL

## 2020-11-27 ENCOUNTER — TELEPHONE (OUTPATIENT)
Dept: CARDIOLOGY CLINIC | Facility: CLINIC | Age: 60
End: 2020-11-27

## 2020-11-27 DIAGNOSIS — R60.0 BILATERAL LOWER EXTREMITY EDEMA: ICD-10-CM

## 2020-11-27 PROCEDURE — 93970 EXTREMITY STUDY: CPT

## 2020-11-27 PROCEDURE — 93970 EXTREMITY STUDY: CPT | Performed by: SURGERY

## 2020-12-04 ENCOUNTER — TELEPHONE (OUTPATIENT)
Dept: VASCULAR SURGERY | Facility: CLINIC | Age: 60
End: 2020-12-04

## 2020-12-07 ENCOUNTER — CONSULT (OUTPATIENT)
Dept: VASCULAR SURGERY | Facility: CLINIC | Age: 60
End: 2020-12-07
Payer: COMMERCIAL

## 2020-12-07 VITALS
SYSTOLIC BLOOD PRESSURE: 142 MMHG | RESPIRATION RATE: 18 BRPM | DIASTOLIC BLOOD PRESSURE: 94 MMHG | HEIGHT: 62 IN | HEART RATE: 80 BPM | BODY MASS INDEX: 30.73 KG/M2 | WEIGHT: 167 LBS

## 2020-12-07 DIAGNOSIS — I87.2 DEEP VENOUS INSUFFICIENCY: Primary | ICD-10-CM

## 2020-12-07 DIAGNOSIS — R60.0 BILATERAL LOWER EXTREMITY EDEMA: ICD-10-CM

## 2020-12-07 PROCEDURE — 3008F BODY MASS INDEX DOCD: CPT | Performed by: SURGERY

## 2020-12-07 PROCEDURE — 1036F TOBACCO NON-USER: CPT | Performed by: SURGERY

## 2020-12-07 PROCEDURE — 99204 OFFICE O/P NEW MOD 45 MIN: CPT | Performed by: SURGERY

## 2020-12-08 DIAGNOSIS — F33.1 MAJOR DEPRESSIVE DISORDER, RECURRENT, MODERATE (HCC): ICD-10-CM

## 2020-12-17 DIAGNOSIS — F33.1 MAJOR DEPRESSIVE DISORDER, RECURRENT, MODERATE (HCC): ICD-10-CM

## 2020-12-17 RX ORDER — BREXPIPRAZOLE 3 MG/1
TABLET ORAL
Qty: 90 TABLET | Refills: 0 | Status: SHIPPED | OUTPATIENT
Start: 2020-12-17 | End: 2021-01-01 | Stop reason: SDUPTHER

## 2021-01-01 ENCOUNTER — OFFICE VISIT (OUTPATIENT)
Dept: PSYCHIATRY | Facility: CLINIC | Age: 61
End: 2021-01-01
Payer: COMMERCIAL

## 2021-01-01 ENCOUNTER — TELEMEDICINE (OUTPATIENT)
Dept: PSYCHIATRY | Facility: CLINIC | Age: 61
End: 2021-01-01
Payer: COMMERCIAL

## 2021-01-01 ENCOUNTER — OFFICE VISIT (OUTPATIENT)
Dept: PULMONOLOGY | Facility: MEDICAL CENTER | Age: 61
End: 2021-01-01
Payer: COMMERCIAL

## 2021-01-01 ENCOUNTER — APPOINTMENT (INPATIENT)
Dept: RADIOLOGY | Facility: HOSPITAL | Age: 61
DRG: 853 | End: 2021-01-01
Payer: COMMERCIAL

## 2021-01-01 ENCOUNTER — APPOINTMENT (EMERGENCY)
Dept: RADIOLOGY | Facility: HOSPITAL | Age: 61
End: 2021-01-01
Payer: COMMERCIAL

## 2021-01-01 ENCOUNTER — TELEPHONE (OUTPATIENT)
Dept: PSYCHIATRY | Facility: CLINIC | Age: 61
End: 2021-01-01

## 2021-01-01 ENCOUNTER — APPOINTMENT (EMERGENCY)
Dept: CT IMAGING | Facility: HOSPITAL | Age: 61
End: 2021-01-01
Payer: COMMERCIAL

## 2021-01-01 ENCOUNTER — HOSPITAL ENCOUNTER (OUTPATIENT)
Dept: RADIOLOGY | Age: 61
Discharge: HOME | End: 2021-03-10
Payer: COMMERCIAL

## 2021-01-01 ENCOUNTER — IMMUNIZATIONS (OUTPATIENT)
Dept: FAMILY MEDICINE CLINIC | Facility: HOSPITAL | Age: 61
End: 2021-01-01

## 2021-01-01 ENCOUNTER — HOSPITAL ENCOUNTER (EMERGENCY)
Facility: HOSPITAL | Age: 61
Discharge: HOME | End: 2021-11-28
Attending: EMERGENCY MEDICINE | Admitting: EMERGENCY MEDICINE
Payer: COMMERCIAL

## 2021-01-01 ENCOUNTER — ANESTHESIA EVENT (INPATIENT)
Dept: PERIOP | Facility: HOSPITAL | Age: 61
DRG: 853 | End: 2021-01-01
Payer: COMMERCIAL

## 2021-01-01 ENCOUNTER — ANESTHESIA (INPATIENT)
Dept: PERIOP | Facility: HOSPITAL | Age: 61
DRG: 853 | End: 2021-01-01
Payer: COMMERCIAL

## 2021-01-01 ENCOUNTER — HOSPITAL ENCOUNTER (EMERGENCY)
Facility: HOSPITAL | Age: 61
End: 2021-12-26
Attending: EMERGENCY MEDICINE
Payer: COMMERCIAL

## 2021-01-01 ENCOUNTER — HOSPITAL ENCOUNTER (INPATIENT)
Facility: HOSPITAL | Age: 61
LOS: 1 days | DRG: 853 | End: 2021-12-27
Attending: SURGERY | Admitting: SURGERY
Payer: COMMERCIAL

## 2021-01-01 ENCOUNTER — TELEPHONE (OUTPATIENT)
Dept: PULMONOLOGY | Facility: CLINIC | Age: 61
End: 2021-01-01

## 2021-01-01 ENCOUNTER — APPOINTMENT (INPATIENT)
Dept: NON INVASIVE DIAGNOSTICS | Facility: HOSPITAL | Age: 61
DRG: 853 | End: 2021-01-01
Payer: COMMERCIAL

## 2021-01-01 ENCOUNTER — APPOINTMENT (INPATIENT)
Dept: CT IMAGING | Facility: HOSPITAL | Age: 61
DRG: 853 | End: 2021-01-01
Payer: COMMERCIAL

## 2021-01-01 VITALS
SYSTOLIC BLOOD PRESSURE: 102 MMHG | WEIGHT: 160.6 LBS | HEIGHT: 62 IN | HEART RATE: 84 BPM | BODY MASS INDEX: 29.55 KG/M2 | DIASTOLIC BLOOD PRESSURE: 62 MMHG

## 2021-01-01 VITALS
DIASTOLIC BLOOD PRESSURE: 63 MMHG | TEMPERATURE: 97.3 F | BODY MASS INDEX: 23.23 KG/M2 | HEART RATE: 101 BPM | OXYGEN SATURATION: 98 % | WEIGHT: 127 LBS | SYSTOLIC BLOOD PRESSURE: 103 MMHG | RESPIRATION RATE: 24 BRPM

## 2021-01-01 VITALS
SYSTOLIC BLOOD PRESSURE: 106 MMHG | BODY MASS INDEX: 24.29 KG/M2 | DIASTOLIC BLOOD PRESSURE: 72 MMHG | TEMPERATURE: 100.4 F | WEIGHT: 132 LBS | HEIGHT: 62 IN | OXYGEN SATURATION: 23 %

## 2021-01-01 VITALS
TEMPERATURE: 97.4 F | HEART RATE: 91 BPM | SYSTOLIC BLOOD PRESSURE: 122 MMHG | DIASTOLIC BLOOD PRESSURE: 78 MMHG | OXYGEN SATURATION: 96 % | BODY MASS INDEX: 33.49 KG/M2 | WEIGHT: 182 LBS | RESPIRATION RATE: 12 BRPM | HEIGHT: 62 IN

## 2021-01-01 VITALS
SYSTOLIC BLOOD PRESSURE: 133 MMHG | DIASTOLIC BLOOD PRESSURE: 65 MMHG | HEART RATE: 97 BPM | RESPIRATION RATE: 20 BRPM | TEMPERATURE: 97 F | BODY MASS INDEX: 23.99 KG/M2 | OXYGEN SATURATION: 99 % | WEIGHT: 131.17 LBS

## 2021-01-01 VITALS — BODY MASS INDEX: 23.37 KG/M2 | HEIGHT: 62 IN | WEIGHT: 127 LBS

## 2021-01-01 DIAGNOSIS — F41.9 ANXIETY: ICD-10-CM

## 2021-01-01 DIAGNOSIS — F99 INSOMNIA DUE TO OTHER MENTAL DISORDER: Primary | ICD-10-CM

## 2021-01-01 DIAGNOSIS — G89.29 CHRONIC BILATERAL LOW BACK PAIN WITH BILATERAL SCIATICA: ICD-10-CM

## 2021-01-01 DIAGNOSIS — F41.9 ANXIETY DISORDER, UNSPECIFIED TYPE: ICD-10-CM

## 2021-01-01 DIAGNOSIS — D38.1 NEOPLASM OF UNCERTAIN BEHAVIOR OF TRACHEA, BRONCHUS, AND LUNG: ICD-10-CM

## 2021-01-01 DIAGNOSIS — A41.9 SEPTIC SHOCK (HCC): ICD-10-CM

## 2021-01-01 DIAGNOSIS — F41.1 GENERALIZED ANXIETY DISORDER: ICD-10-CM

## 2021-01-01 DIAGNOSIS — F33.1 MAJOR DEPRESSIVE DISORDER, RECURRENT, MODERATE (HCC): ICD-10-CM

## 2021-01-01 DIAGNOSIS — M54.41 CHRONIC BILATERAL LOW BACK PAIN WITH BILATERAL SCIATICA: ICD-10-CM

## 2021-01-01 DIAGNOSIS — F33.1 MAJOR DEPRESSIVE DISORDER, RECURRENT, MODERATE (HCC): Primary | ICD-10-CM

## 2021-01-01 DIAGNOSIS — F32.1 CURRENT MODERATE EPISODE OF MAJOR DEPRESSIVE DISORDER WITHOUT PRIOR EPISODE (HCC): ICD-10-CM

## 2021-01-01 DIAGNOSIS — F17.200 TOBACCO DEPENDENCE: ICD-10-CM

## 2021-01-01 DIAGNOSIS — M54.42 CHRONIC BILATERAL LOW BACK PAIN WITH BILATERAL SCIATICA: ICD-10-CM

## 2021-01-01 DIAGNOSIS — R57.0 CARDIOGENIC SHOCK (HCC): Primary | ICD-10-CM

## 2021-01-01 DIAGNOSIS — R91.8 MASS OF UPPER LOBE OF LEFT LUNG: Primary | ICD-10-CM

## 2021-01-01 DIAGNOSIS — F41.1 GAD (GENERALIZED ANXIETY DISORDER): Primary | ICD-10-CM

## 2021-01-01 DIAGNOSIS — R65.21 SEPTIC SHOCK (HCC): ICD-10-CM

## 2021-01-01 DIAGNOSIS — E87.6 HYPOKALEMIA: ICD-10-CM

## 2021-01-01 DIAGNOSIS — K66.8 PNEUMOPERITONEUM: Primary | ICD-10-CM

## 2021-01-01 DIAGNOSIS — S32.029A L2 VERTEBRAL FRACTURE (HCC): ICD-10-CM

## 2021-01-01 DIAGNOSIS — N17.9 AKI (ACUTE KIDNEY INJURY) (HCC): ICD-10-CM

## 2021-01-01 DIAGNOSIS — F51.05 INSOMNIA DUE TO OTHER MENTAL DISORDER: Primary | ICD-10-CM

## 2021-01-01 DIAGNOSIS — Z23 ENCOUNTER FOR IMMUNIZATION: Primary | ICD-10-CM

## 2021-01-01 DIAGNOSIS — E66.9 OBESITY (BMI 30.0-34.9): ICD-10-CM

## 2021-01-01 DIAGNOSIS — S22.080A COMPRESSION FRACTURE OF T12 VERTEBRA (HCC): ICD-10-CM

## 2021-01-01 DIAGNOSIS — F11.20 UNCOMPLICATED OPIOID DEPENDENCE (HCC): ICD-10-CM

## 2021-01-01 DIAGNOSIS — K66.8 INTRA-ABDOMINAL FREE AIR OF UNKNOWN ETIOLOGY: ICD-10-CM

## 2021-01-01 DIAGNOSIS — F51.05 INSOMNIA DUE TO OTHER MENTAL DISORDER: ICD-10-CM

## 2021-01-01 DIAGNOSIS — F99 INSOMNIA DUE TO OTHER MENTAL DISORDER: ICD-10-CM

## 2021-01-01 DIAGNOSIS — F41.1 GAD (GENERALIZED ANXIETY DISORDER): ICD-10-CM

## 2021-01-01 DIAGNOSIS — S81.811A LEG LACERATION, RIGHT, INITIAL ENCOUNTER: Primary | ICD-10-CM

## 2021-01-01 LAB
2HR DELTA HS TROPONIN: 113 NG/L
2HR DELTA HS TROPONIN: 2 NG/L
4HR DELTA HS TROPONIN: 478 NG/L
ABO GROUP BLD: NORMAL
ABO GROUP BLD: NORMAL
ALBUMIN SERPL BCP-MCNC: 0.9 G/DL (ref 3.5–5)
ALBUMIN SERPL BCP-MCNC: 1.4 G/DL (ref 3.5–5)
ALBUMIN SERPL BCP-MCNC: 2 G/DL (ref 3.4–4.8)
ALBUMIN SERPL BCP-MCNC: 2 G/DL (ref 3.5–5)
ALBUMIN SERPL BCP-MCNC: 2.8 G/DL (ref 3.5–5)
ALP SERPL-CCNC: 113 U/L (ref 46–116)
ALP SERPL-CCNC: 61 U/L (ref 46–116)
ALP SERPL-CCNC: 82 U/L (ref 46–116)
ALP SERPL-CCNC: 89 U/L (ref 46–116)
ALP SERPL-CCNC: 92 U/L (ref 35–140)
ALT SERPL W P-5'-P-CCNC: 109 U/L (ref 12–78)
ALT SERPL W P-5'-P-CCNC: 138 U/L (ref 12–78)
ALT SERPL W P-5'-P-CCNC: 203 U/L (ref 12–78)
ALT SERPL W P-5'-P-CCNC: 27 U/L (ref 5–54)
ALT SERPL W P-5'-P-CCNC: 75 U/L (ref 12–78)
ANION GAP SERPL CALCULATED.3IONS-SCNC: 12 MMOL/L (ref 4–13)
ANION GAP SERPL CALCULATED.3IONS-SCNC: 14 MMOL/L (ref 4–13)
ANION GAP SERPL CALCULATED.3IONS-SCNC: 19 MMOL/L (ref 4–13)
ANION GAP SERPL CALCULATED.3IONS-SCNC: 22 MMOL/L (ref 4–13)
ANION GAP SERPL CALCULATED.3IONS-SCNC: 28 MMOL/L (ref 4–13)
ANISOCYTOSIS BLD QL SMEAR: PRESENT
AORTIC ROOT: 3.5 CM
AORTIC VALVE MEAN VELOCITY: 7.2 M/S
APICAL FOUR CHAMBER EJECTION FRACTION: 30 %
APTT PPP: 46 SECONDS (ref 23–37)
ARTERIAL PATENCY WRIST A: NO
ARTERIAL PATENCY WRIST A: YES
ASCENDING AORTA: 3.5 CM
AST SERPL W P-5'-P-CCNC: 1148 U/L (ref 5–45)
AST SERPL W P-5'-P-CCNC: 1866 U/L (ref 5–45)
AST SERPL W P-5'-P-CCNC: 379 U/L (ref 5–45)
AST SERPL W P-5'-P-CCNC: 438 U/L (ref 5–45)
AST SERPL W P-5'-P-CCNC: 85 U/L (ref 15–41)
ATRIAL RATE: 110 BPM
ATRIAL RATE: 87 BPM
AV LVOT MEAN GRADIENT: 1 MMHG
AV LVOT PEAK GRADIENT: 2 MMHG
AV MEAN GRADIENT: 2 MMHG
AV PEAK GRADIENT: 4 MMHG
BASE EX.OXY STD BLDV CALC-SCNC: 29.7 % (ref 60–80)
BASE EX.OXY STD BLDV CALC-SCNC: 60.8 % (ref 60–80)
BASE EX.OXY STD BLDV CALC-SCNC: 93.9 % (ref 60–80)
BASE EXCESS BLDA CALC-SCNC: -11.6 MMOL/L
BASE EXCESS BLDA CALC-SCNC: -13.2 MMOL/L
BASE EXCESS BLDA CALC-SCNC: -13.8 MMOL/L
BASE EXCESS BLDA CALC-SCNC: -19.9 MMOL/L
BASE EXCESS BLDA CALC-SCNC: -2 MMOL/L (ref -2–3)
BASE EXCESS BLDV CALC-SCNC: -12.1 MMOL/L
BASE EXCESS BLDV CALC-SCNC: -17.1 MMOL/L
BASE EXCESS BLDV CALC-SCNC: -3.3 MMOL/L
BASOPHILS # BLD AUTO: 0.01 THOUSANDS/ΜL (ref 0–0.1)
BASOPHILS # BLD AUTO: 0.05 THOUSANDS/ΜL (ref 0–0.1)
BASOPHILS # BLD MANUAL: 0 THOUSAND/UL (ref 0–0.1)
BASOPHILS NFR BLD AUTO: 0 % (ref 0–1)
BASOPHILS NFR BLD AUTO: 2 % (ref 0–1)
BASOPHILS NFR MAR MANUAL: 0 % (ref 0–1)
BILIRUB SERPL-MCNC: 0.69 MG/DL (ref 0.2–1)
BILIRUB SERPL-MCNC: 1.11 MG/DL (ref 0.2–1)
BILIRUB SERPL-MCNC: 1.17 MG/DL (ref 0.3–1.2)
BILIRUB SERPL-MCNC: 1.21 MG/DL (ref 0.2–1)
BILIRUB SERPL-MCNC: 1.4 MG/DL (ref 0.2–1)
BLD GP AB SCN SERPL QL: NEGATIVE
BODY TEMPERATURE: 96.2 DEGREES FEHRENHEIT
BODY TEMPERATURE: 99.7 DEGREES FEHRENHEIT
BUN SERPL-MCNC: 18 MG/DL (ref 5–25)
BUN SERPL-MCNC: 19 MG/DL (ref 5–25)
BUN SERPL-MCNC: 19 MG/DL (ref 5–25)
BUN SERPL-MCNC: 20 MG/DL (ref 5–25)
BUN SERPL-MCNC: 20 MG/DL (ref 6–20)
CA-I BLD-SCNC: 0.82 MMOL/L (ref 1.12–1.32)
CA-I BLD-SCNC: 1.02 MMOL/L (ref 1.12–1.32)
CA-I BLD-SCNC: 1.36 MMOL/L (ref 1.12–1.32)
CALCIUM ALBUM COR SERPL-MCNC: 13.6 MG/DL (ref 8.3–10.1)
CALCIUM ALBUM COR SERPL-MCNC: 6.6 MG/DL (ref 8.3–10.1)
CALCIUM ALBUM COR SERPL-MCNC: 8.3 MG/DL (ref 8.3–10.1)
CALCIUM ALBUM COR SERPL-MCNC: 8.5 MG/DL (ref 8.3–10.1)
CALCIUM ALBUM COR SERPL-MCNC: 9.2 MG/DL (ref 8.3–10.1)
CALCIUM SERPL-MCNC: 12 MG/DL (ref 8.3–10.1)
CALCIUM SERPL-MCNC: 5.6 MG/DL (ref 8.3–10.1)
CALCIUM SERPL-MCNC: 6.2 MG/DL (ref 8.3–10.1)
CALCIUM SERPL-MCNC: 6.7 MG/DL (ref 8.3–10.1)
CALCIUM SERPL-MCNC: 6.9 MG/DL (ref 8.4–10.2)
CARDIAC TROPONIN I PNL SERPL HS: 121 NG/L
CARDIAC TROPONIN I PNL SERPL HS: 234 NG/L
CARDIAC TROPONIN I PNL SERPL HS: 37 NG/L
CARDIAC TROPONIN I PNL SERPL HS: 39 NG/L
CARDIAC TROPONIN I PNL SERPL HS: 52 NG/L (ref 8–18)
CARDIAC TROPONIN I PNL SERPL HS: 599 NG/L
CARDIAC TROPONIN I PNL SERPL HS: 81 NG/L (ref 8–18)
CHLORIDE SERPL-SCNC: 101 MMOL/L (ref 100–108)
CHLORIDE SERPL-SCNC: 94 MMOL/L (ref 96–108)
CHLORIDE SERPL-SCNC: 97 MMOL/L (ref 100–108)
CHLORIDE SERPL-SCNC: 98 MMOL/L (ref 100–108)
CHLORIDE SERPL-SCNC: 99 MMOL/L (ref 100–108)
CK MB SERPL-MCNC: 3.2 % (ref 0–2.5)
CK MB SERPL-MCNC: 9.3 NG/ML (ref 0.1–5.9)
CK SERPL-CCNC: 290.5 U/L (ref 38–234)
CO2 SERPL-SCNC: 14 MMOL/L (ref 21–32)
CO2 SERPL-SCNC: 16 MMOL/L (ref 21–32)
CO2 SERPL-SCNC: 16 MMOL/L (ref 21–32)
CO2 SERPL-SCNC: 24 MMOL/L (ref 21–32)
CO2 SERPL-SCNC: 24 MMOL/L (ref 22–33)
CORTIS SERPL-MCNC: 58.6 UG/DL
CREAT SERPL-MCNC: 1.63 MG/DL (ref 0.6–1.3)
CREAT SERPL-MCNC: 1.69 MG/DL (ref 0.6–1.3)
CREAT SERPL-MCNC: 1.7 MG/DL (ref 0.6–1.3)
CREAT SERPL-MCNC: 1.8 MG/DL (ref 0.6–1.3)
CREAT SERPL-MCNC: 1.84 MG/DL (ref 0.4–1.1)
DOP CALC AO VTI: 12.86 CM
DOP CALC LVOT PEAK VEL VTI: 8.05 CM
DOP CALC LVOT PEAK VEL: 0.7 M/S
E WAVE DECELERATION TIME: 105 MS
EOSINOPHIL # BLD AUTO: 0 THOUSAND/ΜL (ref 0–0.61)
EOSINOPHIL # BLD AUTO: 0 THOUSAND/ΜL (ref 0–0.61)
EOSINOPHIL # BLD MANUAL: 0 THOUSAND/UL (ref 0–0.4)
EOSINOPHIL NFR BLD AUTO: 0 % (ref 0–6)
EOSINOPHIL NFR BLD AUTO: 0 % (ref 0–6)
EOSINOPHIL NFR BLD MANUAL: 0 % (ref 0–6)
ERYTHROCYTE [DISTWIDTH] IN BLOOD BY AUTOMATED COUNT: 17 % (ref 11.6–15.1)
ERYTHROCYTE [DISTWIDTH] IN BLOOD BY AUTOMATED COUNT: 17.5 % (ref 11.6–15.1)
ERYTHROCYTE [DISTWIDTH] IN BLOOD BY AUTOMATED COUNT: 18 % (ref 11.6–15.1)
ERYTHROCYTE [DISTWIDTH] IN BLOOD BY AUTOMATED COUNT: 18.8 % (ref 11.6–15.1)
ERYTHROCYTE [DISTWIDTH] IN BLOOD BY AUTOMATED COUNT: 18.8 % (ref 11.6–15.1)
FIBRINOGEN PPP-MCNC: 125 MG/DL (ref 227–495)
FIBRINOGEN PPP-MCNC: 168 MG/DL (ref 227–495)
FLUAV RNA RESP QL NAA+PROBE: NEGATIVE
FLUBV RNA RESP QL NAA+PROBE: NEGATIVE
GFR SERPL CREATININE-BSD FRML MDRD: 29 ML/MIN/1.73SQ M
GFR SERPL CREATININE-BSD FRML MDRD: 29 ML/MIN/1.73SQ M
GFR SERPL CREATININE-BSD FRML MDRD: 32 ML/MIN/1.73SQ M
GFR SERPL CREATININE-BSD FRML MDRD: 32 ML/MIN/1.73SQ M
GFR SERPL CREATININE-BSD FRML MDRD: 33 ML/MIN/1.73SQ M
GLUCOSE SERPL-MCNC: 132 MG/DL (ref 65–140)
GLUCOSE SERPL-MCNC: 340 MG/DL (ref 65–140)
GLUCOSE SERPL-MCNC: 365 MG/DL (ref 65–140)
GLUCOSE SERPL-MCNC: 41 MG/DL (ref 65–140)
GLUCOSE SERPL-MCNC: 59 MG/DL (ref 65–140)
GLUCOSE SERPL-MCNC: 67 MG/DL (ref 65–140)
GLUCOSE SERPL-MCNC: 81 MG/DL (ref 65–140)
GLUCOSE SERPL-MCNC: 82 MG/DL (ref 65–140)
GLUCOSE SERPL-MCNC: 91 MG/DL (ref 65–140)
GLUCOSE SERPL-MCNC: <20 MG/DL (ref 65–140)
GLUCOSE SERPL-MCNC: <20 MG/DL (ref 65–140)
HCO3 BLDA-SCNC: 13 MMOL/L (ref 22–28)
HCO3 BLDA-SCNC: 14.6 MMOL/L (ref 22–28)
HCO3 BLDA-SCNC: 15.7 MMOL/L (ref 22–28)
HCO3 BLDA-SCNC: 23.2 MMOL/L (ref 22–28)
HCO3 BLDA-SCNC: 9.9 MMOL/L (ref 22–28)
HCO3 BLDV-SCNC: 14.4 MMOL/L (ref 24–30)
HCO3 BLDV-SCNC: 16 MMOL/L (ref 24–30)
HCO3 BLDV-SCNC: 22.2 MMOL/L (ref 24–30)
HCT VFR BLD AUTO: 18.9 % (ref 34.8–46.1)
HCT VFR BLD AUTO: 21.2 % (ref 34.8–46.1)
HCT VFR BLD AUTO: 27.4 % (ref 34.8–46.1)
HCT VFR BLD AUTO: 30.3 % (ref 34.8–46.1)
HCT VFR BLD AUTO: 32.8 % (ref 34.8–46.1)
HCT VFR BLD CALC: 30 % (ref 34.8–46.1)
HGB BLD-MCNC: 10.5 G/DL (ref 11.5–15.4)
HGB BLD-MCNC: 11.2 G/DL (ref 11.5–15.4)
HGB BLD-MCNC: 6.2 G/DL (ref 11.5–15.4)
HGB BLD-MCNC: 6.8 G/DL (ref 11.5–15.4)
HGB BLD-MCNC: 6.8 G/DL (ref 11.5–15.4)
HGB BLD-MCNC: 8.9 G/DL (ref 11.5–15.4)
HGB BLDA-MCNC: 10.2 G/DL (ref 11.5–15.4)
HOROWITZ INDEX BLDA+IHG-RTO: 40 MM[HG]
HOROWITZ INDEX BLDA+IHG-RTO: 60 MM[HG]
HOROWITZ INDEX BLDA+IHG-RTO: 60 MM[HG]
IMM GRANULOCYTES # BLD AUTO: 0.01 THOUSAND/UL (ref 0–0.2)
IMM GRANULOCYTES # BLD AUTO: 0.03 THOUSAND/UL (ref 0–0.2)
IMM GRANULOCYTES NFR BLD AUTO: 0 % (ref 0–2)
IMM GRANULOCYTES NFR BLD AUTO: 1 % (ref 0–2)
INR PPP: 1.49 (ref 0.84–1.19)
INR PPP: 1.49 (ref 0.84–1.19)
INR PPP: 1.55 (ref 0.84–1.19)
INR PPP: 2.93 (ref 0.84–1.19)
INR PPP: 3.49 (ref 0.84–1.19)
INTERVENTRICULAR SEPTUM IN DIASTOLE (PARASTERNAL SHORT AXIS VIEW): 1.2 CM
LAAS-AP2: 16.9 CM2
LAAS-AP4: 16.7 CM2
LACTATE SERPL-SCNC: 11.3 MMOL/L (ref 0.5–2)
LACTATE SERPL-SCNC: 11.3 MMOL/L (ref 0.5–2)
LACTATE SERPL-SCNC: 12.3 MMOL/L (ref 0.5–2)
LACTATE SERPL-SCNC: 13.8 MMOL/L (ref 0.5–2)
LACTATE SERPL-SCNC: 14.4 MMOL/L (ref 0.5–2)
LACTATE SERPL-SCNC: 22.6 MMOL/L (ref 0.5–2)
LACTATE SERPL-SCNC: 4.9 MMOL/L (ref 0.5–2)
LACTATE SERPL-SCNC: 5.3 MMOL/L (ref 0–2)
LACTATE SERPL-SCNC: 6.5 MMOL/L (ref 0–2)
LEFT VENTRICULAR INTERNAL DIMENSION IN DIASTOLE: 3.6 CM (ref 3.77–5.61)
LEFT VENTRICULAR POSTERIOR WALL IN END DIASTOLE: 1 CM
LIPASE SERPL-CCNC: <6 U/L (ref 13–60)
LYMPHOCYTES # BLD AUTO: 0.35 THOUSANDS/ΜL (ref 0.6–4.47)
LYMPHOCYTES # BLD AUTO: 0.49 THOUSAND/UL (ref 0.6–4.47)
LYMPHOCYTES # BLD AUTO: 0.78 THOUSANDS/ΜL (ref 0.6–4.47)
LYMPHOCYTES # BLD AUTO: 26 % (ref 14–44)
LYMPHOCYTES NFR BLD AUTO: 12 % (ref 14–44)
LYMPHOCYTES NFR BLD AUTO: 33 % (ref 14–44)
MAGNESIUM SERPL-MCNC: 2.2 MG/DL (ref 1.6–2.6)
MCH RBC QN AUTO: 28.2 PG (ref 26.8–34.3)
MCH RBC QN AUTO: 28.4 PG (ref 26.8–34.3)
MCH RBC QN AUTO: 28.6 PG (ref 26.8–34.3)
MCH RBC QN AUTO: 28.9 PG (ref 26.8–34.3)
MCH RBC QN AUTO: 29 PG (ref 26.8–34.3)
MCHC RBC AUTO-ENTMCNC: 32.1 G/DL (ref 31.4–37.4)
MCHC RBC AUTO-ENTMCNC: 32.5 G/DL (ref 31.4–37.4)
MCHC RBC AUTO-ENTMCNC: 32.8 G/DL (ref 31.4–37.4)
MCHC RBC AUTO-ENTMCNC: 34.1 G/DL (ref 31.4–37.4)
MCHC RBC AUTO-ENTMCNC: 34.7 G/DL (ref 31.4–37.4)
MCV RBC AUTO: 82 FL (ref 82–98)
MCV RBC AUTO: 83 FL (ref 82–98)
MCV RBC AUTO: 88 FL (ref 82–98)
MCV RBC AUTO: 89 FL (ref 82–98)
MCV RBC AUTO: 89 FL (ref 82–98)
MONOCYTES # BLD AUTO: 0.09 THOUSAND/ΜL (ref 0.17–1.22)
MONOCYTES # BLD AUTO: 0.16 THOUSAND/ΜL (ref 0.17–1.22)
MONOCYTES # BLD AUTO: 0.6 THOUSAND/UL (ref 0–1.22)
MONOCYTES NFR BLD AUTO: 4 % (ref 4–12)
MONOCYTES NFR BLD AUTO: 6 % (ref 4–12)
MONOCYTES NFR BLD: 32 % (ref 4–12)
MV E'TISSUE VEL-SEP: 4 CM/S
MV PEAK A VEL: 0.66 M/S
MV PEAK E VEL: 49 CM/S
NEUTROPHILS # BLD AUTO: 1.44 THOUSANDS/ΜL (ref 1.85–7.62)
NEUTROPHILS # BLD AUTO: 2.32 THOUSANDS/ΜL (ref 1.85–7.62)
NEUTROPHILS # BLD MANUAL: 0.79 THOUSAND/UL (ref 1.85–7.62)
NEUTS BAND NFR BLD MANUAL: 14 % (ref 0–8)
NEUTS SEG NFR BLD AUTO: 28 % (ref 43–75)
NEUTS SEG NFR BLD AUTO: 60 % (ref 43–75)
NEUTS SEG NFR BLD AUTO: 82 % (ref 43–75)
NRBC BLD AUTO-RTO: 0 /100 WBCS
NRBC BLD AUTO-RTO: 0 /100 WBCS
NRBC BLD AUTO-RTO: 2 /100 WBCS
O2 CT BLDA-SCNC: 10.6 ML/DL (ref 16–23)
O2 CT BLDA-SCNC: 13.2 ML/DL (ref 16–23)
O2 CT BLDA-SCNC: 8.6 ML/DL (ref 16–23)
O2 CT BLDA-SCNC: 9 ML/DL (ref 16–23)
O2 CT BLDV-SCNC: 13.8 ML/DL
O2 CT BLDV-SCNC: 2.5 ML/DL
O2 CT BLDV-SCNC: 6.7 ML/DL
OXYHGB MFR BLDA: 96.7 % (ref 94–97)
OXYHGB MFR BLDA: 97.2 % (ref 94–97)
OXYHGB MFR BLDA: 97.7 % (ref 94–97)
OXYHGB MFR BLDA: 98.2 % (ref 94–97)
P AXIS: 77 DEGREES
PCO2 BLD: 24 MMOL/L (ref 21–32)
PCO2 BLD: 42.7 MM HG (ref 36–44)
PCO2 BLDA: 33.9 MM HG (ref 36–44)
PCO2 BLDA: 39 MM HG (ref 36–44)
PCO2 BLDA: 42.6 MM HG (ref 36–44)
PCO2 BLDA: 43.1 MM HG (ref 36–44)
PCO2 BLDV: 41.4 MM HG (ref 42–50)
PCO2 BLDV: 47.2 MM HG (ref 42–50)
PCO2 BLDV: 77.3 MM HG (ref 42–50)
PCO2 TEMP ADJ BLDA: 36.8 MM HG (ref 36–44)
PCO2 TEMP ADJ BLDA: 44.2 MM HG (ref 36–44)
PEEP RESPIRATORY: 6 CM[H2O]
PH BLD: 7.04 [PH] (ref 7.35–7.45)
PH BLD: 7.17 [PH] (ref 7.35–7.45)
PH BLD: 7.34 [PH] (ref 7.35–7.45)
PH BLDA: 7.02 [PH] (ref 7.35–7.45)
PH BLDA: 7.15 [PH] (ref 7.35–7.45)
PH BLDA: 7.18 [PH] (ref 7.35–7.45)
PH BLDA: 7.2 [PH] (ref 7.35–7.45)
PH BLDV: 6.89 [PH] (ref 7.3–7.4)
PH BLDV: 7.15 [PH] (ref 7.3–7.4)
PH BLDV: 7.35 [PH] (ref 7.3–7.4)
PLATELET # BLD AUTO: 107 THOUSANDS/UL (ref 149–390)
PLATELET # BLD AUTO: 119 THOUSANDS/UL (ref 149–390)
PLATELET # BLD AUTO: 121 THOUSANDS/UL (ref 149–390)
PLATELET # BLD AUTO: 146 THOUSANDS/UL (ref 149–390)
PLATELET # BLD AUTO: 72 THOUSANDS/UL (ref 149–390)
PLATELET BLD QL SMEAR: ABNORMAL
PMV BLD AUTO: 10.5 FL (ref 8.9–12.7)
PMV BLD AUTO: 11.4 FL (ref 8.9–12.7)
PMV BLD AUTO: 12 FL (ref 8.9–12.7)
PMV BLD AUTO: 12.5 FL (ref 8.9–12.7)
PMV BLD AUTO: 13 FL (ref 8.9–12.7)
PO2 BLD: 154.4 MM HG (ref 75–129)
PO2 BLD: 194.2 MM HG (ref 75–129)
PO2 BLD: >400 MM HG (ref 75–129)
PO2 BLDA: 150.8 MM HG (ref 75–129)
PO2 BLDA: 162 MM HG (ref 75–129)
PO2 BLDA: 200.8 MM HG (ref 75–129)
PO2 BLDA: 205.9 MM HG (ref 75–129)
PO2 BLDV: 107.3 MM HG (ref 35–45)
PO2 BLDV: 29 MM HG (ref 35–45)
PO2 BLDV: 42 MM HG (ref 35–45)
POTASSIUM BLD-SCNC: 3.4 MMOL/L (ref 3.5–5.3)
POTASSIUM SERPL-SCNC: 2.2 MMOL/L (ref 3.5–5)
POTASSIUM SERPL-SCNC: 4.6 MMOL/L (ref 3.5–5.3)
POTASSIUM SERPL-SCNC: 5 MMOL/L (ref 3.5–5.3)
POTASSIUM SERPL-SCNC: 5.1 MMOL/L (ref 3.5–5.3)
POTASSIUM SERPL-SCNC: 7.2 MMOL/L (ref 3.5–5.3)
PR INTERVAL: 167 MS
PR INTERVAL: 570 MS
PROCALCITONIN SERPL-MCNC: 32.2 NG/ML
PROCALCITONIN SERPL-MCNC: 37.28 NG/ML
PROCALCITONIN SERPL-MCNC: 37.47 NG/ML
PROT SERPL-MCNC: 3.1 G/DL (ref 6.4–8.2)
PROT SERPL-MCNC: 3.6 G/DL (ref 6.4–8.2)
PROT SERPL-MCNC: 3.9 G/DL (ref 6.4–8.2)
PROT SERPL-MCNC: 4 G/DL (ref 6.4–8.3)
PROT SERPL-MCNC: 4.1 G/DL (ref 6.4–8.2)
PROTHROMBIN TIME: 17.6 SECONDS (ref 11.6–14.5)
PROTHROMBIN TIME: 17.6 SECONDS (ref 11.6–14.5)
PROTHROMBIN TIME: 18.3 SECONDS (ref 11.6–14.5)
PROTHROMBIN TIME: 29.3 SECONDS (ref 11.6–14.5)
PROTHROMBIN TIME: 33.7 SECONDS (ref 11.6–14.5)
QRS AXIS: -45 DEGREES
QRS AXIS: 62 DEGREES
QRSD INTERVAL: 142 MS
QRSD INTERVAL: 146 MS
QT INTERVAL: 421 MS
QT INTERVAL: 429 MS
QTC INTERVAL: 517 MS
QTC INTERVAL: 570 MS
RBC # BLD AUTO: 2.14 MILLION/UL (ref 3.81–5.12)
RBC # BLD AUTO: 2.38 MILLION/UL (ref 3.81–5.12)
RBC # BLD AUTO: 3.08 MILLION/UL (ref 3.81–5.12)
RBC # BLD AUTO: 3.72 MILLION/UL (ref 3.81–5.12)
RBC # BLD AUTO: 3.95 MILLION/UL (ref 3.81–5.12)
RH BLD: POSITIVE
RH BLD: POSITIVE
RIGHT ATRIAL 2D VOLUME: 34 ML
RIGHT ATRIUM AREA SYSTOLE A4C: 13.7 CM2
RIGHT VENTRICLE ID DIMENSION: 3.5 CM
RSV RNA RESP QL NAA+PROBE: NEGATIVE
SARS-COV-2 RNA RESP QL NAA+PROBE: NEGATIVE
SL CV LV EF: 15
SL CV PED ECHO LEFT VENTRICLE DIASTOLIC VOLUME (MOD BIPLANE) 2D: 56 ML
SODIUM BLD-SCNC: 133 MMOL/L (ref 136–145)
SODIUM SERPL-SCNC: 132 MMOL/L (ref 133–145)
SODIUM SERPL-SCNC: 133 MMOL/L (ref 136–145)
SODIUM SERPL-SCNC: 134 MMOL/L (ref 136–145)
SODIUM SERPL-SCNC: 137 MMOL/L (ref 136–145)
SODIUM SERPL-SCNC: 142 MMOL/L (ref 136–145)
SPECIMEN EXPIRATION DATE: NORMAL
SPECIMEN SOURCE: ABNORMAL
T WAVE AXIS: 86 DEGREES
T WAVE AXIS: 98 DEGREES
TRICUSPID VALVE PEAK REGURGITATION VELOCITY: 3.19 M/S
TRICUSPID VALVE S': 47 CM/S
TV PEAK GRADIENT: 41 MMHG
VENT AC: 16
VENT AC: 16
VENT AC: 24
VENT- AC: AC
VENTRICULAR RATE: 110 BPM
VENTRICULAR RATE: 87 BPM
VT SETTING VENT: 350 ML
VT SETTING VENT: 350 ML
VT SETTING VENT: 400 ML
WBC # BLD AUTO: 1.24 THOUSAND/UL (ref 4.31–10.16)
WBC # BLD AUTO: 1.25 THOUSAND/UL (ref 4.31–10.16)
WBC # BLD AUTO: 1.89 THOUSAND/UL (ref 4.31–10.16)
WBC # BLD AUTO: 2.39 THOUSAND/UL (ref 4.31–10.16)
WBC # BLD AUTO: 2.85 THOUSAND/UL (ref 4.31–10.16)
Z-SCORE OF LEFT VENTRICULAR DIMENSION IN END SYSTOLE: -2.45

## 2021-01-01 PROCEDURE — 93306 TTE W/DOPPLER COMPLETE: CPT

## 2021-01-01 PROCEDURE — 85610 PROTHROMBIN TIME: CPT | Performed by: NURSE PRACTITIONER

## 2021-01-01 PROCEDURE — 82805 BLOOD GASES W/O2 SATURATION: CPT | Performed by: NURSE PRACTITIONER

## 2021-01-01 PROCEDURE — 99291 CRITICAL CARE FIRST HOUR: CPT

## 2021-01-01 PROCEDURE — 99284 EMERGENCY DEPT VISIT MOD MDM: CPT | Performed by: EMERGENCY MEDICINE

## 2021-01-01 PROCEDURE — 99214 OFFICE O/P EST MOD 30 MIN: CPT | Performed by: NURSE PRACTITIONER

## 2021-01-01 PROCEDURE — 80053 COMPREHEN METABOLIC PANEL: CPT | Performed by: NURSE PRACTITIONER

## 2021-01-01 PROCEDURE — 85730 THROMBOPLASTIN TIME PARTIAL: CPT | Performed by: EMERGENCY MEDICINE

## 2021-01-01 PROCEDURE — 85025 COMPLETE CBC W/AUTO DIFF WBC: CPT | Performed by: ANESTHESIOLOGY

## 2021-01-01 PROCEDURE — 82330 ASSAY OF CALCIUM: CPT | Performed by: NURSE PRACTITIONER

## 2021-01-01 PROCEDURE — 3008F BODY MASS INDEX DOCD: CPT | Performed by: INTERNAL MEDICINE

## 2021-01-01 PROCEDURE — 71250 CT THORAX DX C-: CPT

## 2021-01-01 PROCEDURE — 83605 ASSAY OF LACTIC ACID: CPT | Performed by: EMERGENCY MEDICINE

## 2021-01-01 PROCEDURE — 99282 EMERGENCY DEPT VISIT SF MDM: CPT

## 2021-01-01 PROCEDURE — 83605 ASSAY OF LACTIC ACID: CPT | Performed by: NURSE PRACTITIONER

## 2021-01-01 PROCEDURE — 0241U HB NFCT DS VIR RESP RNA 4 TRGT: CPT | Performed by: EMERGENCY MEDICINE

## 2021-01-01 PROCEDURE — 30233M1 TRANSFUSION OF NONAUTOLOGOUS PLASMA CRYOPRECIPITATE INTO PERIPHERAL VEIN, PERCUTANEOUS APPROACH: ICD-10-PCS | Performed by: INTERNAL MEDICINE

## 2021-01-01 PROCEDURE — 85025 COMPLETE CBC W/AUTO DIFF WBC: CPT | Performed by: NURSE PRACTITIONER

## 2021-01-01 PROCEDURE — 93308 TTE F-UP OR LMTD: CPT | Performed by: INTERNAL MEDICINE

## 2021-01-01 PROCEDURE — 85384 FIBRINOGEN ACTIVITY: CPT | Performed by: NURSE PRACTITIONER

## 2021-01-01 PROCEDURE — 96375 TX/PRO/DX INJ NEW DRUG ADDON: CPT

## 2021-01-01 PROCEDURE — 3008F BODY MASS INDEX DOCD: CPT | Performed by: NURSE PRACTITIONER

## 2021-01-01 PROCEDURE — G1004 CDSM NDSC: HCPCS

## 2021-01-01 PROCEDURE — 36415 COLL VENOUS BLD VENIPUNCTURE: CPT | Performed by: EMERGENCY MEDICINE

## 2021-01-01 PROCEDURE — 72125 CT NECK SPINE W/O DYE: CPT

## 2021-01-01 PROCEDURE — 86901 BLOOD TYPING SEROLOGIC RH(D): CPT | Performed by: ANESTHESIOLOGY

## 2021-01-01 PROCEDURE — 85007 BL SMEAR W/DIFF WBC COUNT: CPT | Performed by: ANESTHESIOLOGY

## 2021-01-01 PROCEDURE — 85018 HEMOGLOBIN: CPT | Performed by: NURSE PRACTITIONER

## 2021-01-01 PROCEDURE — 99204 OFFICE O/P NEW MOD 45 MIN: CPT | Performed by: INTERNAL MEDICINE

## 2021-01-01 PROCEDURE — 99024 POSTOP FOLLOW-UP VISIT: CPT | Performed by: STUDENT IN AN ORGANIZED HEALTH CARE EDUCATION/TRAINING PROGRAM

## 2021-01-01 PROCEDURE — 30233N1 TRANSFUSION OF NONAUTOLOGOUS RED BLOOD CELLS INTO PERIPHERAL VEIN, PERCUTANEOUS APPROACH: ICD-10-PCS | Performed by: INTERNAL MEDICINE

## 2021-01-01 PROCEDURE — 5A12012 PERFORMANCE OF CARDIAC OUTPUT, SINGLE, MANUAL: ICD-10-PCS | Performed by: INTERNAL MEDICINE

## 2021-01-01 PROCEDURE — 74174 CTA ABD&PLVS W/CONTRAST: CPT

## 2021-01-01 PROCEDURE — 84295 ASSAY OF SERUM SODIUM: CPT

## 2021-01-01 PROCEDURE — 82533 TOTAL CORTISOL: CPT | Performed by: NURSE PRACTITIONER

## 2021-01-01 PROCEDURE — 99233 SBSQ HOSP IP/OBS HIGH 50: CPT | Performed by: INTERNAL MEDICINE

## 2021-01-01 PROCEDURE — 90715 TDAP VACCINE 7 YRS/> IM: CPT | Performed by: EMERGENCY MEDICINE

## 2021-01-01 PROCEDURE — 85025 COMPLETE CBC W/AUTO DIFF WBC: CPT | Performed by: EMERGENCY MEDICINE

## 2021-01-01 PROCEDURE — 85027 COMPLETE CBC AUTOMATED: CPT | Performed by: ANESTHESIOLOGY

## 2021-01-01 PROCEDURE — 86920 COMPATIBILITY TEST SPIN: CPT

## 2021-01-01 PROCEDURE — 87077 CULTURE AEROBIC IDENTIFY: CPT | Performed by: EMERGENCY MEDICINE

## 2021-01-01 PROCEDURE — 80053 COMPREHEN METABOLIC PANEL: CPT | Performed by: EMERGENCY MEDICINE

## 2021-01-01 PROCEDURE — P9040 RBC LEUKOREDUCED IRRADIATED: HCPCS

## 2021-01-01 PROCEDURE — 82948 REAGENT STRIP/BLOOD GLUCOSE: CPT

## 2021-01-01 PROCEDURE — 93005 ELECTROCARDIOGRAM TRACING: CPT

## 2021-01-01 PROCEDURE — 99215 OFFICE O/P EST HI 40 MIN: CPT | Performed by: NURSE PRACTITIONER

## 2021-01-01 PROCEDURE — P9012 CRYOPRECIPITATE EACH UNIT: HCPCS

## 2021-01-01 PROCEDURE — 93306 TTE W/DOPPLER COMPLETE: CPT | Performed by: INTERNAL MEDICINE

## 2021-01-01 PROCEDURE — 0DQA4ZZ REPAIR JEJUNUM, PERCUTANEOUS ENDOSCOPIC APPROACH: ICD-10-PCS | Performed by: SURGERY

## 2021-01-01 PROCEDURE — P9017 PLASMA 1 DONOR FRZ W/IN 8 HR: HCPCS

## 2021-01-01 PROCEDURE — 85014 HEMATOCRIT: CPT

## 2021-01-01 PROCEDURE — 96366 THER/PROPH/DIAG IV INF ADDON: CPT

## 2021-01-01 PROCEDURE — 99222 1ST HOSP IP/OBS MODERATE 55: CPT | Performed by: INTERNAL MEDICINE

## 2021-01-01 PROCEDURE — 96365 THER/PROPH/DIAG IV INF INIT: CPT

## 2021-01-01 PROCEDURE — NC001 PR NO CHARGE: Performed by: INTERNAL MEDICINE

## 2021-01-01 PROCEDURE — 36556 INSERT NON-TUNNEL CV CATH: CPT | Performed by: EMERGENCY MEDICINE

## 2021-01-01 PROCEDURE — 1036F TOBACCO NON-USER: CPT | Performed by: INTERNAL MEDICINE

## 2021-01-01 PROCEDURE — 71045 X-RAY EXAM CHEST 1 VIEW: CPT

## 2021-01-01 PROCEDURE — 99291 CRITICAL CARE FIRST HOUR: CPT | Performed by: INTERNAL MEDICINE

## 2021-01-01 PROCEDURE — 0DNW4ZZ RELEASE PERITONEUM, PERCUTANEOUS ENDOSCOPIC APPROACH: ICD-10-PCS | Performed by: SURGERY

## 2021-01-01 PROCEDURE — 96367 TX/PROPH/DG ADDL SEQ IV INF: CPT

## 2021-01-01 PROCEDURE — 90471 IMMUNIZATION ADMIN: CPT

## 2021-01-01 PROCEDURE — 86850 RBC ANTIBODY SCREEN: CPT | Performed by: ANESTHESIOLOGY

## 2021-01-01 PROCEDURE — 94002 VENT MGMT INPAT INIT DAY: CPT

## 2021-01-01 PROCEDURE — 1036F TOBACCO NON-USER: CPT | Performed by: NURSE PRACTITIONER

## 2021-01-01 PROCEDURE — 82330 ASSAY OF CALCIUM: CPT | Performed by: INTERNAL MEDICINE

## 2021-01-01 PROCEDURE — 91300 COVID-19 PFIZER VACC 0.3 ML: CPT

## 2021-01-01 PROCEDURE — 83605 ASSAY OF LACTIC ACID: CPT | Performed by: ANESTHESIOLOGY

## 2021-01-01 PROCEDURE — 85610 PROTHROMBIN TIME: CPT | Performed by: ANESTHESIOLOGY

## 2021-01-01 PROCEDURE — 80053 COMPREHEN METABOLIC PANEL: CPT | Performed by: ANESTHESIOLOGY

## 2021-01-01 PROCEDURE — 30233K1 TRANSFUSION OF NONAUTOLOGOUS FROZEN PLASMA INTO PERIPHERAL VEIN, PERCUTANEOUS APPROACH: ICD-10-PCS | Performed by: INTERNAL MEDICINE

## 2021-01-01 PROCEDURE — 83690 ASSAY OF LIPASE: CPT | Performed by: EMERGENCY MEDICINE

## 2021-01-01 PROCEDURE — 12004 RPR S/N/AX/GEN/TRK7.6-12.5CM: CPT | Performed by: EMERGENCY MEDICINE

## 2021-01-01 PROCEDURE — 73030 X-RAY EXAM OF SHOULDER: CPT

## 2021-01-01 PROCEDURE — 87040 BLOOD CULTURE FOR BACTERIA: CPT | Performed by: EMERGENCY MEDICINE

## 2021-01-01 PROCEDURE — 87106 FUNGI IDENTIFICATION YEAST: CPT | Performed by: EMERGENCY MEDICINE

## 2021-01-01 PROCEDURE — 70450 CT HEAD/BRAIN W/O DYE: CPT

## 2021-01-01 PROCEDURE — 82805 BLOOD GASES W/O2 SATURATION: CPT | Performed by: EMERGENCY MEDICINE

## 2021-01-01 PROCEDURE — 84132 ASSAY OF SERUM POTASSIUM: CPT

## 2021-01-01 PROCEDURE — 78815 PET IMAGE W/CT SKULL-THIGH: CPT

## 2021-01-01 PROCEDURE — 90833 PSYTX W PT W E/M 30 MIN: CPT | Performed by: NURSE PRACTITIONER

## 2021-01-01 PROCEDURE — P9037 PLATE PHERES LEUKOREDU IRRAD: HCPCS

## 2021-01-01 PROCEDURE — 86900 BLOOD TYPING SEROLOGIC ABO: CPT | Performed by: ANESTHESIOLOGY

## 2021-01-01 PROCEDURE — 44238 UNLISTED LAPS PX INTESTINE: CPT | Performed by: SURGERY

## 2021-01-01 PROCEDURE — 99291 CRITICAL CARE FIRST HOUR: CPT | Performed by: EMERGENCY MEDICINE

## 2021-01-01 PROCEDURE — 93010 ELECTROCARDIOGRAM REPORT: CPT

## 2021-01-01 PROCEDURE — 82805 BLOOD GASES W/O2 SATURATION: CPT | Performed by: SURGERY

## 2021-01-01 PROCEDURE — 82550 ASSAY OF CK (CPK): CPT | Performed by: EMERGENCY MEDICINE

## 2021-01-01 PROCEDURE — 96368 THER/DIAG CONCURRENT INF: CPT

## 2021-01-01 PROCEDURE — 82803 BLOOD GASES ANY COMBINATION: CPT

## 2021-01-01 PROCEDURE — 99285 EMERGENCY DEPT VISIT HI MDM: CPT

## 2021-01-01 PROCEDURE — 85610 PROTHROMBIN TIME: CPT | Performed by: EMERGENCY MEDICINE

## 2021-01-01 PROCEDURE — 99223 1ST HOSP IP/OBS HIGH 75: CPT | Performed by: STUDENT IN AN ORGANIZED HEALTH CARE EDUCATION/TRAINING PROGRAM

## 2021-01-01 PROCEDURE — 99292 CRITICAL CARE ADDL 30 MIN: CPT | Performed by: INTERNAL MEDICINE

## 2021-01-01 PROCEDURE — 84484 ASSAY OF TROPONIN QUANT: CPT | Performed by: NURSE PRACTITIONER

## 2021-01-01 PROCEDURE — 30233R1 TRANSFUSION OF NONAUTOLOGOUS PLATELETS INTO PERIPHERAL VEIN, PERCUTANEOUS APPROACH: ICD-10-PCS | Performed by: INTERNAL MEDICINE

## 2021-01-01 PROCEDURE — C9113 INJ PANTOPRAZOLE SODIUM, VIA: HCPCS | Performed by: NURSE PRACTITIONER

## 2021-01-01 PROCEDURE — 84484 ASSAY OF TROPONIN QUANT: CPT | Performed by: EMERGENCY MEDICINE

## 2021-01-01 PROCEDURE — 82553 CREATINE MB FRACTION: CPT | Performed by: EMERGENCY MEDICINE

## 2021-01-01 PROCEDURE — 84145 PROCALCITONIN (PCT): CPT | Performed by: NURSE PRACTITIONER

## 2021-01-01 PROCEDURE — 83735 ASSAY OF MAGNESIUM: CPT | Performed by: NURSE PRACTITIONER

## 2021-01-01 PROCEDURE — 44238 UNLISTED LAPS PX INTESTINE: CPT | Performed by: STUDENT IN AN ORGANIZED HEALTH CARE EDUCATION/TRAINING PROGRAM

## 2021-01-01 PROCEDURE — A9552 F18 FDG: HCPCS

## 2021-01-01 PROCEDURE — 85027 COMPLETE CBC AUTOMATED: CPT | Performed by: NURSE PRACTITIONER

## 2021-01-01 PROCEDURE — 0001A COVID-19 PFIZER VACC 0.3 ML: CPT

## 2021-01-01 PROCEDURE — C9290 INJ, BUPIVACAINE LIPOSOME: HCPCS | Performed by: STUDENT IN AN ORGANIZED HEALTH CARE EDUCATION/TRAINING PROGRAM

## 2021-01-01 PROCEDURE — 74176 CT ABD & PELVIS W/O CONTRAST: CPT

## 2021-01-01 PROCEDURE — 0WCG4ZZ EXTIRPATION OF MATTER FROM PERITONEAL CAVITY, PERCUTANEOUS ENDOSCOPIC APPROACH: ICD-10-PCS | Performed by: SURGERY

## 2021-01-01 PROCEDURE — 82947 ASSAY GLUCOSE BLOOD QUANT: CPT

## 2021-01-01 PROCEDURE — 84145 PROCALCITONIN (PCT): CPT | Performed by: EMERGENCY MEDICINE

## 2021-01-01 RX ORDER — NALOXONE HYDROCHLORIDE 4 MG/.1ML
SPRAY NASAL
COMMUNITY
Start: 2021-01-01 | End: 2021-01-01 | Stop reason: HOSPADM

## 2021-01-01 RX ORDER — HEPARIN SODIUM 5000 [USP'U]/ML
5000 INJECTION, SOLUTION INTRAVENOUS; SUBCUTANEOUS EVERY 8 HOURS SCHEDULED
Status: DISCONTINUED | OUTPATIENT
Start: 2021-01-01 | End: 2021-01-01

## 2021-01-01 RX ORDER — LIDOCAINE HYDROCHLORIDE AND EPINEPHRINE 10; 10 MG/ML; UG/ML
10 INJECTION, SOLUTION INFILTRATION; PERINEURAL ONCE
Status: COMPLETED | OUTPATIENT
Start: 2021-01-01 | End: 2021-01-01

## 2021-01-01 RX ORDER — MAGNESIUM HYDROXIDE 1200 MG/15ML
LIQUID ORAL AS NEEDED
Status: DISCONTINUED | OUTPATIENT
Start: 2021-01-01 | End: 2021-01-01 | Stop reason: HOSPADM

## 2021-01-01 RX ORDER — ESZOPICLONE 2 MG/1
2 TABLET, FILM COATED ORAL
Qty: 30 TABLET | Refills: 0 | Status: SHIPPED | OUTPATIENT
Start: 2021-01-01 | End: 2021-01-01 | Stop reason: SDUPTHER

## 2021-01-01 RX ORDER — GINSENG 100 MG
1 CAPSULE ORAL ONCE
Status: COMPLETED | OUTPATIENT
Start: 2021-01-01 | End: 2021-01-01

## 2021-01-01 RX ORDER — FLUCONAZOLE 2 MG/ML
200 INJECTION, SOLUTION INTRAVENOUS EVERY 24 HOURS
Status: DISCONTINUED | OUTPATIENT
Start: 2021-12-28 | End: 2021-01-01 | Stop reason: HOSPADM

## 2021-01-01 RX ORDER — BUPROPION HYDROCHLORIDE 300 MG/1
300 TABLET ORAL DAILY
Qty: 90 TABLET | Refills: 0 | Status: SHIPPED | OUTPATIENT
Start: 2021-01-01 | End: 2021-01-01

## 2021-01-01 RX ORDER — OXYCODONE HYDROCHLORIDE 20 MG/1
TABLET ORAL
COMMUNITY
Start: 2021-01-01 | End: 2021-01-01 | Stop reason: HOSPADM

## 2021-01-01 RX ORDER — POTASSIUM CHLORIDE 14.9 MG/ML
20 INJECTION INTRAVENOUS ONCE
Status: COMPLETED | OUTPATIENT
Start: 2021-01-01 | End: 2021-01-01

## 2021-01-01 RX ORDER — MILRINONE LACTATE 0.2 MG/ML
0.13 INJECTION, SOLUTION INTRAVENOUS CONTINUOUS
Status: DISCONTINUED | OUTPATIENT
Start: 2021-01-01 | End: 2021-01-01 | Stop reason: HOSPADM

## 2021-01-01 RX ORDER — ALBUMIN (HUMAN) 12.5 G/50ML
50 SOLUTION INTRAVENOUS ONCE
Status: COMPLETED | OUTPATIENT
Start: 2021-01-01 | End: 2021-01-01

## 2021-01-01 RX ORDER — PREGABALIN 75 MG/1
75 CAPSULE ORAL 3 TIMES DAILY
COMMUNITY
Start: 2021-01-01 | End: 2021-01-01 | Stop reason: SDUPTHER

## 2021-01-01 RX ORDER — CEFEPIME HYDROCHLORIDE 2 G/50ML
2000 INJECTION, SOLUTION INTRAVENOUS ONCE
Status: COMPLETED | OUTPATIENT
Start: 2021-01-01 | End: 2021-01-01

## 2021-01-01 RX ORDER — CYCLOBENZAPRINE HCL 10 MG
10 TABLET ORAL 3 TIMES DAILY PRN
COMMUNITY
Start: 2021-01-01 | End: 2021-01-01 | Stop reason: HOSPADM

## 2021-01-01 RX ORDER — CLINDAMYCIN HYDROCHLORIDE 300 MG/1
300 CAPSULE ORAL 3 TIMES DAILY
COMMUNITY
Start: 2021-01-01 | End: 2021-01-01

## 2021-01-01 RX ORDER — VECURONIUM BROMIDE 1 MG/ML
INJECTION, POWDER, LYOPHILIZED, FOR SOLUTION INTRAVENOUS AS NEEDED
Status: DISCONTINUED | OUTPATIENT
Start: 2021-01-01 | End: 2021-01-01

## 2021-01-01 RX ORDER — SERTRALINE HYDROCHLORIDE 100 MG/1
200 TABLET, FILM COATED ORAL DAILY
Qty: 180 TABLET | Refills: 0 | Status: SHIPPED | OUTPATIENT
Start: 2021-01-01 | End: 2021-01-01

## 2021-01-01 RX ORDER — ALBUTEROL SULFATE 90 UG/1
2 AEROSOL, METERED RESPIRATORY (INHALATION) EVERY 6 HOURS PRN
COMMUNITY
Start: 2021-01-01 | End: 2021-01-01 | Stop reason: HOSPADM

## 2021-01-01 RX ORDER — SODIUM CHLORIDE, SODIUM GLUCONATE, SODIUM ACETATE, POTASSIUM CHLORIDE, MAGNESIUM CHLORIDE, SODIUM PHOSPHATE, DIBASIC, AND POTASSIUM PHOSPHATE .53; .5; .37; .037; .03; .012; .00082 G/100ML; G/100ML; G/100ML; G/100ML; G/100ML; G/100ML; G/100ML
1000 INJECTION, SOLUTION INTRAVENOUS ONCE
Status: COMPLETED | OUTPATIENT
Start: 2021-01-01 | End: 2021-01-01

## 2021-01-01 RX ORDER — KETAMINE HYDROCHLORIDE 50 MG/ML
INJECTION, SOLUTION, CONCENTRATE INTRAMUSCULAR; INTRAVENOUS AS NEEDED
Status: DISCONTINUED | OUTPATIENT
Start: 2021-01-01 | End: 2021-01-01

## 2021-01-01 RX ORDER — CLONAZEPAM 0.5 MG/1
0.5 TABLET ORAL 2 TIMES DAILY
Qty: 60 TABLET | Refills: 0 | Status: SHIPPED | OUTPATIENT
Start: 2021-01-01 | End: 2021-01-01 | Stop reason: SDUPTHER

## 2021-01-01 RX ORDER — CEFAZOLIN SODIUM 2 G/50ML
2000 SOLUTION INTRAVENOUS EVERY 8 HOURS
Status: DISCONTINUED | OUTPATIENT
Start: 2021-01-01 | End: 2021-01-01 | Stop reason: HOSPADM

## 2021-01-01 RX ORDER — HYDROXYZINE PAMOATE 25 MG/1
25 CAPSULE ORAL 3 TIMES DAILY PRN
Qty: 30 CAPSULE | Refills: 0 | Status: SHIPPED | OUTPATIENT
Start: 2021-01-01 | End: 2021-01-01 | Stop reason: SDUPTHER

## 2021-01-01 RX ORDER — CHLORHEXIDINE GLUCONATE 0.12 MG/ML
15 RINSE ORAL EVERY 12 HOURS SCHEDULED
Status: DISCONTINUED | OUTPATIENT
Start: 2021-01-01 | End: 2021-01-01 | Stop reason: HOSPADM

## 2021-01-01 RX ORDER — FENTANYL CITRATE 50 UG/ML
50 INJECTION, SOLUTION INTRAMUSCULAR; INTRAVENOUS
Status: DISCONTINUED | OUTPATIENT
Start: 2021-01-01 | End: 2021-01-01 | Stop reason: HOSPADM

## 2021-01-01 RX ORDER — MIDAZOLAM HYDROCHLORIDE 2 MG/2ML
INJECTION, SOLUTION INTRAMUSCULAR; INTRAVENOUS AS NEEDED
Status: DISCONTINUED | OUTPATIENT
Start: 2021-01-01 | End: 2021-01-01

## 2021-01-01 RX ORDER — ALBUMIN (HUMAN) 12.5 G/50ML
12.5 SOLUTION INTRAVENOUS ONCE
Status: COMPLETED | OUTPATIENT
Start: 2021-01-01 | End: 2021-01-01

## 2021-01-01 RX ORDER — BREXPIPRAZOLE 3 MG/1
TABLET ORAL
Qty: 30 TABLET | Refills: 2 | Status: SHIPPED | OUTPATIENT
Start: 2021-01-01 | End: 2021-01-01 | Stop reason: SDUPTHER

## 2021-01-01 RX ORDER — CEPHALEXIN 500 MG/1
500 CAPSULE ORAL 3 TIMES DAILY
Qty: 21 CAPSULE | Refills: 0 | Status: SHIPPED | OUTPATIENT
Start: 2021-01-01 | End: 2021-01-01

## 2021-01-01 RX ORDER — DEXTROSE MONOHYDRATE 25 G/50ML
50 INJECTION, SOLUTION INTRAVENOUS ONCE
Status: COMPLETED | OUTPATIENT
Start: 2021-01-01 | End: 2021-01-01

## 2021-01-01 RX ORDER — BUPIVACAINE HYDROCHLORIDE 5 MG/ML
INJECTION, SOLUTION PERINEURAL AS NEEDED
Status: DISCONTINUED | OUTPATIENT
Start: 2021-01-01 | End: 2021-01-01 | Stop reason: HOSPADM

## 2021-01-01 RX ORDER — SULFAMETHOXAZOLE AND TRIMETHOPRIM 800; 160 MG/1; MG/1
TABLET ORAL
COMMUNITY
Start: 2021-01-01 | End: 2021-01-01 | Stop reason: HOSPADM

## 2021-01-01 RX ORDER — CALCIUM GLUCONATE 20 MG/ML
1 INJECTION, SOLUTION INTRAVENOUS ONCE
Status: COMPLETED | OUTPATIENT
Start: 2021-01-01 | End: 2021-01-01

## 2021-01-01 RX ORDER — SERTRALINE HYDROCHLORIDE 100 MG/1
200 TABLET, FILM COATED ORAL DAILY
Qty: 180 TABLET | Refills: 0 | Status: SHIPPED | OUTPATIENT
Start: 2021-01-01 | End: 2021-01-01 | Stop reason: HOSPADM

## 2021-01-01 RX ORDER — DEXTROSE MONOHYDRATE 50 MG/ML
INJECTION, SOLUTION INTRAVENOUS CONTINUOUS PRN
Status: DISCONTINUED | OUTPATIENT
Start: 2021-01-01 | End: 2021-01-01

## 2021-01-01 RX ORDER — BUPROPION HYDROCHLORIDE 300 MG/1
300 TABLET ORAL
COMMUNITY
End: 2021-01-01 | Stop reason: HOSPADM

## 2021-01-01 RX ORDER — FENTANYL CITRATE 50 UG/ML
INJECTION, SOLUTION INTRAMUSCULAR; INTRAVENOUS AS NEEDED
Status: DISCONTINUED | OUTPATIENT
Start: 2021-01-01 | End: 2021-01-01

## 2021-01-01 RX ORDER — HYDROMORPHONE HCL/PF 1 MG/ML
1 SYRINGE (ML) INJECTION EVERY 4 HOURS PRN
Status: DISCONTINUED | OUTPATIENT
Start: 2021-01-01 | End: 2021-01-01 | Stop reason: HOSPADM

## 2021-01-01 RX ORDER — SUCCINYLCHOLINE/SOD CL,ISO/PF 100 MG/5ML
SYRINGE (ML) INTRAVENOUS AS NEEDED
Status: DISCONTINUED | OUTPATIENT
Start: 2021-01-01 | End: 2021-01-01

## 2021-01-01 RX ORDER — UMECLIDINIUM BROMIDE AND VILANTEROL TRIFENATATE 62.5; 25 UG/1; UG/1
POWDER RESPIRATORY (INHALATION)
COMMUNITY
Start: 2021-01-01 | End: 2021-01-01 | Stop reason: HOSPADM

## 2021-01-01 RX ORDER — PROPOFOL 10 MG/ML
INJECTION, EMULSION INTRAVENOUS AS NEEDED
Status: DISCONTINUED | OUTPATIENT
Start: 2021-01-01 | End: 2021-01-01

## 2021-01-01 RX ORDER — SERTRALINE HYDROCHLORIDE 100 MG/1
TABLET, FILM COATED ORAL
Qty: 180 TABLET | Refills: 3 | Status: SHIPPED | OUTPATIENT
Start: 2021-01-01 | End: 2021-01-01 | Stop reason: SDUPTHER

## 2021-01-01 RX ORDER — BUPROPION HYDROCHLORIDE 300 MG/1
300 TABLET ORAL DAILY
Qty: 90 TABLET | Refills: 0 | Status: SHIPPED | OUTPATIENT
Start: 2021-01-01 | End: 2021-01-01 | Stop reason: SDUPTHER

## 2021-01-01 RX ORDER — CALCIUM GLUCONATE 20 MG/ML
2 INJECTION, SOLUTION INTRAVENOUS ONCE
Status: COMPLETED | OUTPATIENT
Start: 2021-01-01 | End: 2021-01-01

## 2021-01-01 RX ORDER — FENTANYL 100 UG/H
PATCH TRANSDERMAL
COMMUNITY
Start: 2021-01-01 | End: 2021-01-01 | Stop reason: HOSPADM

## 2021-01-01 RX ORDER — FENTANYL 75 UG/H
PATCH TRANSDERMAL
COMMUNITY
Start: 2021-01-01 | End: 2021-01-01 | Stop reason: HOSPADM

## 2021-01-01 RX ORDER — ESZOPICLONE 2 MG/1
2 TABLET, FILM COATED ORAL
Qty: 30 TABLET | Refills: 0 | Status: SHIPPED | OUTPATIENT
Start: 2021-01-01 | End: 2021-01-01 | Stop reason: HOSPADM

## 2021-01-01 RX ORDER — CEPHALEXIN 500 MG/1
500 CAPSULE ORAL 3 TIMES DAILY
Qty: 21 CAPSULE | Refills: 0 | Status: SHIPPED | OUTPATIENT
Start: 2021-01-01 | End: 2021-01-01 | Stop reason: SDUPTHER

## 2021-01-01 RX ORDER — CLONAZEPAM 0.5 MG/1
0.5 TABLET ORAL 2 TIMES DAILY
Qty: 60 TABLET | Refills: 0 | Status: SHIPPED | OUTPATIENT
Start: 2021-01-01 | End: 2021-01-01 | Stop reason: HOSPADM

## 2021-01-01 RX ORDER — HYDROXYZINE PAMOATE 25 MG/1
25 CAPSULE ORAL 3 TIMES DAILY PRN
Qty: 90 CAPSULE | Refills: 0 | Status: SHIPPED | OUTPATIENT
Start: 2021-01-01 | End: 2021-01-01 | Stop reason: SDUPTHER

## 2021-01-01 RX ORDER — BENZONATATE 200 MG/1
200 CAPSULE ORAL 3 TIMES DAILY PRN
COMMUNITY
Start: 2021-01-01 | End: 2021-01-01 | Stop reason: HOSPADM

## 2021-01-01 RX ORDER — EPINEPHRINE 1 MG/ML
INJECTION, SOLUTION, CONCENTRATE INTRAVENOUS
Status: COMPLETED
Start: 2021-01-01 | End: 2021-01-01

## 2021-01-01 RX ORDER — PROPOFOL 10 MG/ML
5-50 INJECTION, EMULSION INTRAVENOUS
Status: DISCONTINUED | OUTPATIENT
Start: 2021-01-01 | End: 2021-01-01 | Stop reason: HOSPADM

## 2021-01-01 RX ORDER — CEPHALEXIN 500 MG/1
500 CAPSULE ORAL ONCE
Status: COMPLETED | OUTPATIENT
Start: 2021-01-01 | End: 2021-01-01

## 2021-01-01 RX ORDER — NOREPINEPHRINE BITARTRATE 1 MG/ML
INJECTION, SOLUTION INTRAVENOUS AS NEEDED
Status: DISCONTINUED | OUTPATIENT
Start: 2021-01-01 | End: 2021-01-01

## 2021-01-01 RX ORDER — POTASSIUM CHLORIDE 14.9 MG/ML
20 INJECTION INTRAVENOUS ONCE
Status: DISCONTINUED | OUTPATIENT
Start: 2021-01-01 | End: 2021-01-01 | Stop reason: HOSPADM

## 2021-01-01 RX ORDER — PANTOPRAZOLE SODIUM 40 MG/1
40 INJECTION, POWDER, FOR SOLUTION INTRAVENOUS EVERY 12 HOURS SCHEDULED
Status: DISCONTINUED | OUTPATIENT
Start: 2021-01-01 | End: 2021-01-01 | Stop reason: HOSPADM

## 2021-01-01 RX ORDER — SODIUM CHLORIDE 9 MG/ML
INJECTION, SOLUTION INTRAVENOUS CONTINUOUS PRN
Status: DISCONTINUED | OUTPATIENT
Start: 2021-01-01 | End: 2021-01-01

## 2021-01-01 RX ORDER — ALBUTEROL SULFATE 90 UG/1
AEROSOL, METERED RESPIRATORY (INHALATION)
COMMUNITY
Start: 2021-01-01 | End: 2021-01-01 | Stop reason: SDUPTHER

## 2021-01-01 RX ORDER — FLUCONAZOLE 2 MG/ML
400 INJECTION, SOLUTION INTRAVENOUS EVERY 24 HOURS
Status: DISCONTINUED | OUTPATIENT
Start: 2021-01-01 | End: 2021-01-01

## 2021-01-01 RX ORDER — ROCURONIUM BROMIDE 10 MG/ML
INJECTION, SOLUTION INTRAVENOUS AS NEEDED
Status: DISCONTINUED | OUTPATIENT
Start: 2021-01-01 | End: 2021-01-01

## 2021-01-01 RX ORDER — PHENAZOPYRIDINE HYDROCHLORIDE 100 MG/1
TABLET, FILM COATED ORAL
COMMUNITY
Start: 2021-01-01 | End: 2021-01-01 | Stop reason: HOSPADM

## 2021-01-01 RX ORDER — SODIUM CHLORIDE, SODIUM GLUCONATE, SODIUM ACETATE, POTASSIUM CHLORIDE, MAGNESIUM CHLORIDE, SODIUM PHOSPHATE, DIBASIC, AND POTASSIUM PHOSPHATE .53; .5; .37; .037; .03; .012; .00082 G/100ML; G/100ML; G/100ML; G/100ML; G/100ML; G/100ML; G/100ML
500 INJECTION, SOLUTION INTRAVENOUS ONCE
Status: COMPLETED | OUTPATIENT
Start: 2021-01-01 | End: 2021-01-01

## 2021-01-01 RX ORDER — SODIUM CHLORIDE, SODIUM LACTATE, POTASSIUM CHLORIDE, CALCIUM CHLORIDE 600; 310; 30; 20 MG/100ML; MG/100ML; MG/100ML; MG/100ML
125 INJECTION, SOLUTION INTRAVENOUS CONTINUOUS
Status: DISCONTINUED | OUTPATIENT
Start: 2021-01-01 | End: 2021-01-01

## 2021-01-01 RX ORDER — POTASSIUM CHLORIDE 20 MEQ/1
40 TABLET, EXTENDED RELEASE ORAL ONCE
Status: DISCONTINUED | OUTPATIENT
Start: 2021-01-01 | End: 2021-01-01

## 2021-01-01 RX ORDER — SERTRALINE HYDROCHLORIDE 100 MG/1
200 TABLET, FILM COATED ORAL DAILY
Qty: 180 TABLET | Refills: 0 | Status: SHIPPED | OUTPATIENT
Start: 2021-01-01 | End: 2021-01-01 | Stop reason: SDUPTHER

## 2021-01-01 RX ORDER — HYDROXYZINE PAMOATE 25 MG/1
25 CAPSULE ORAL 3 TIMES DAILY PRN
Qty: 270 CAPSULE | Refills: 0 | Status: SHIPPED | OUTPATIENT
Start: 2021-01-01 | End: 2021-01-01 | Stop reason: HOSPADM

## 2021-01-01 RX ADMIN — THIAMINE HYDROCHLORIDE 100 MG: 100 INJECTION, SOLUTION INTRAMUSCULAR; INTRAVENOUS at 09:21

## 2021-01-01 RX ADMIN — SODIUM CHLORIDE, SODIUM GLUCONATE, SODIUM ACETATE, POTASSIUM CHLORIDE, MAGNESIUM CHLORIDE, SODIUM PHOSPHATE, DIBASIC, AND POTASSIUM PHOSPHATE 1000 ML: .53; .5; .37; .037; .03; .012; .00082 INJECTION, SOLUTION INTRAVENOUS at 18:56

## 2021-01-01 RX ADMIN — CEFAZOLIN SODIUM 2000 MG: 2 SOLUTION INTRAVENOUS at 21:55

## 2021-01-01 RX ADMIN — PROPOFOL 10 MCG/KG/MIN: 10 INJECTION, EMULSION INTRAVENOUS at 00:27

## 2021-01-01 RX ADMIN — VASOPRESSIN 1 UNITS: 20 INJECTION INTRAVENOUS at 22:11

## 2021-01-01 RX ADMIN — HEPARIN SODIUM 5000 UNITS: 5000 INJECTION INTRAVENOUS; SUBCUTANEOUS at 06:22

## 2021-01-01 RX ADMIN — SODIUM CHLORIDE, SODIUM GLUCONATE, SODIUM ACETATE, POTASSIUM CHLORIDE, MAGNESIUM CHLORIDE, SODIUM PHOSPHATE, DIBASIC, AND POTASSIUM PHOSPHATE 500 ML: .53; .5; .37; .037; .03; .012; .00082 INJECTION, SOLUTION INTRAVENOUS at 12:45

## 2021-01-01 RX ADMIN — PROPOFOL 100 MG: 10 INJECTION, EMULSION INTRAVENOUS at 21:52

## 2021-01-01 RX ADMIN — EPINEPHRINE: 1 INJECTION, SOLUTION, CONCENTRATE INTRAVENOUS at 13:00

## 2021-01-01 RX ADMIN — HYDROCORTISONE SODIUM SUCCINATE 100 MG: 100 INJECTION, POWDER, FOR SOLUTION INTRAMUSCULAR; INTRAVENOUS at 13:21

## 2021-01-01 RX ADMIN — LIDOCAINE HYDROCHLORIDE,EPINEPHRINE BITARTRATE 10 ML: 10; .01 INJECTION, SOLUTION INFILTRATION; PERINEURAL at 20:47

## 2021-01-01 RX ADMIN — ALBUMIN (HUMAN) 50 G: 0.25 INJECTION, SOLUTION INTRAVENOUS at 03:05

## 2021-01-01 RX ADMIN — Medication 100 MG: at 21:52

## 2021-01-01 RX ADMIN — FENTANYL CITRATE 100 MCG: 50 INJECTION INTRAMUSCULAR; INTRAVENOUS at 21:52

## 2021-01-01 RX ADMIN — CHLORHEXIDINE GLUCONATE 0.12% ORAL RINSE 15 ML: 1.2 LIQUID ORAL at 00:36

## 2021-01-01 RX ADMIN — VECURONIUM BROMIDE 10 MG: 1 INJECTION, POWDER, LYOPHILIZED, FOR SOLUTION INTRAVENOUS at 22:41

## 2021-01-01 RX ADMIN — PANTOPRAZOLE SODIUM 40 MG: 40 INJECTION, POWDER, FOR SOLUTION INTRAVENOUS at 09:22

## 2021-01-01 RX ADMIN — NOREPINEPHRINE BITARTRATE 30 MCG/MIN: 1 INJECTION, SOLUTION, CONCENTRATE INTRAVENOUS at 06:27

## 2021-01-01 RX ADMIN — MIDAZOLAM 2 MG: 1 INJECTION INTRAMUSCULAR; INTRAVENOUS at 22:32

## 2021-01-01 RX ADMIN — MIDAZOLAM 2 MG: 1 INJECTION INTRAMUSCULAR; INTRAVENOUS at 21:52

## 2021-01-01 RX ADMIN — SODIUM BICARBONATE 125 ML/HR: 84 INJECTION, SOLUTION INTRAVENOUS at 01:20

## 2021-01-01 RX ADMIN — ROCURONIUM BROMIDE 50 MG: 50 INJECTION, SOLUTION INTRAVENOUS at 22:04

## 2021-01-01 RX ADMIN — VASOPRESSIN 1 UNITS: 20 INJECTION INTRAVENOUS at 22:46

## 2021-01-01 RX ADMIN — POTASSIUM CHLORIDE 20 MEQ: 14.9 INJECTION, SOLUTION INTRAVENOUS at 20:18

## 2021-01-01 RX ADMIN — EPINEPHRINE 2 MCG/MIN: 1 INJECTION, SOLUTION, CONCENTRATE INTRAVENOUS at 13:21

## 2021-01-01 RX ADMIN — PANTOPRAZOLE SODIUM 40 MG: 40 INJECTION, POWDER, FOR SOLUTION INTRAVENOUS at 00:00

## 2021-01-01 RX ADMIN — CHLORHEXIDINE GLUCONATE 0.12% ORAL RINSE 15 ML: 1.2 LIQUID ORAL at 09:21

## 2021-01-01 RX ADMIN — NOREPINEPHRINE BITARTRATE 28 MCG/MIN: 1 INJECTION, SOLUTION, CONCENTRATE INTRAVENOUS at 08:30

## 2021-01-01 RX ADMIN — NOREPINEPHRINE BITARTRATE 14 MCG: 1 INJECTION INTRAVENOUS at 22:41

## 2021-01-01 RX ADMIN — VASOPRESSIN 1 UNITS: 20 INJECTION INTRAVENOUS at 22:41

## 2021-01-01 RX ADMIN — SODIUM CHLORIDE: 0.9 INJECTION, SOLUTION INTRAVENOUS at 23:35

## 2021-01-01 RX ADMIN — VASOPRESSIN 1 UNITS: 20 INJECTION INTRAVENOUS at 22:17

## 2021-01-01 RX ADMIN — SODIUM CHLORIDE: 0.9 INJECTION, SOLUTION INTRAVENOUS at 22:42

## 2021-01-01 RX ADMIN — ALBUMIN (HUMAN) 12.5 G: 0.25 INJECTION, SOLUTION INTRAVENOUS at 13:22

## 2021-01-01 RX ADMIN — IOHEXOL 100 ML: 350 INJECTION, SOLUTION INTRAVENOUS at 05:32

## 2021-01-01 RX ADMIN — VASOPRESSIN 1 UNITS: 20 INJECTION INTRAVENOUS at 22:04

## 2021-01-01 RX ADMIN — CALCIUM GLUCONATE 2 G: 20 INJECTION, SOLUTION INTRAVENOUS at 05:54

## 2021-01-01 RX ADMIN — FLUCONAZOLE IN SODIUM CHLORIDE 400 MG: 2 INJECTION, SOLUTION INTRAVENOUS at 01:33

## 2021-01-01 RX ADMIN — DEXTROSE MONOHYDRATE 50 ML: 500 INJECTION PARENTERAL at 02:32

## 2021-01-01 RX ADMIN — SODIUM BICARBONATE 100 MEQ: 84 INJECTION INTRAVENOUS at 00:51

## 2021-01-01 RX ADMIN — FENTANYL CITRATE 50 MCG: 50 INJECTION INTRAMUSCULAR; INTRAVENOUS at 20:19

## 2021-01-01 RX ADMIN — BUPIVACAINE 20 ML: 13.3 INJECTION, SUSPENSION, LIPOSOMAL INFILTRATION at 21:35

## 2021-01-01 RX ADMIN — NOREPINEPHRINE BITARTRATE 30 MCG/MIN: 1 INJECTION, SOLUTION, CONCENTRATE INTRAVENOUS at 13:26

## 2021-01-01 RX ADMIN — SODIUM BICARBONATE 125 ML/HR: 84 INJECTION, SOLUTION INTRAVENOUS at 09:23

## 2021-01-01 RX ADMIN — SODIUM CHLORIDE, SODIUM LACTATE, POTASSIUM CHLORIDE, AND CALCIUM CHLORIDE 125 ML/HR: .6; .31; .03; .02 INJECTION, SOLUTION INTRAVENOUS at 00:19

## 2021-01-01 RX ADMIN — DEXTROSE: 5 SOLUTION INTRAVENOUS at 22:39

## 2021-01-01 RX ADMIN — SODIUM CHLORIDE, SODIUM GLUCONATE, SODIUM ACETATE, POTASSIUM CHLORIDE, MAGNESIUM CHLORIDE, SODIUM PHOSPHATE, DIBASIC, AND POTASSIUM PHOSPHATE 1000 ML: .53; .5; .37; .037; .03; .012; .00082 INJECTION, SOLUTION INTRAVENOUS at 16:12

## 2021-01-01 RX ADMIN — PIPERACILLIN SODIUM AND TAZOBACTAM SODIUM 3.38 G: 36; 4.5 INJECTION, POWDER, LYOPHILIZED, FOR SOLUTION INTRAVENOUS at 00:57

## 2021-01-01 RX ADMIN — BACITRACIN 1 SMALL APPLICATION: 500 OINTMENT TOPICAL at 21:10

## 2021-01-01 RX ADMIN — VASOPRESSIN 2 UNITS: 20 INJECTION INTRAVENOUS at 21:52

## 2021-01-01 RX ADMIN — SODIUM CHLORIDE 19 MCG/MIN: 0.9 INJECTION, SOLUTION INTRAVENOUS at 21:45

## 2021-01-01 RX ADMIN — NOREPINEPHRINE BITARTRATE 5 MCG/MIN: 1 INJECTION, SOLUTION, CONCENTRATE INTRAVENOUS at 17:59

## 2021-01-01 RX ADMIN — POTASSIUM CHLORIDE 20 MEQ: 14.9 INJECTION, SOLUTION INTRAVENOUS at 18:45

## 2021-01-01 RX ADMIN — MILRINONE LACTATE IN DEXTROSE 0.38 MCG/KG/MIN: 200 INJECTION, SOLUTION INTRAVENOUS at 02:45

## 2021-01-01 RX ADMIN — CEFEPIME HYDROCHLORIDE 2000 MG: 2 INJECTION, SOLUTION INTRAVENOUS at 17:03

## 2021-01-01 RX ADMIN — NOREPINEPHRINE BITARTRATE 25 MCG/MIN: 1 INJECTION, SOLUTION, CONCENTRATE INTRAVENOUS at 10:48

## 2021-01-01 RX ADMIN — PIPERACILLIN SODIUM AND TAZOBACTAM SODIUM 3.38 G: 36; 4.5 INJECTION, POWDER, LYOPHILIZED, FOR SOLUTION INTRAVENOUS at 06:22

## 2021-01-01 RX ADMIN — MIDAZOLAM 2 MG: 1 INJECTION INTRAMUSCULAR; INTRAVENOUS at 23:35

## 2021-01-01 RX ADMIN — SODIUM CHLORIDE, SODIUM GLUCONATE, SODIUM ACETATE, POTASSIUM CHLORIDE, MAGNESIUM CHLORIDE, SODIUM PHOSPHATE, DIBASIC, AND POTASSIUM PHOSPHATE 1000 ML: .53; .5; .37; .037; .03; .012; .00082 INJECTION, SOLUTION INTRAVENOUS at 17:07

## 2021-01-01 RX ADMIN — KETAMINE HYDROCHLORIDE 50 MG: 50 INJECTION INTRAMUSCULAR; INTRAVENOUS at 21:57

## 2021-01-01 RX ADMIN — NOREPINEPHRINE BITARTRATE 30 MCG/MIN: 1 INJECTION, SOLUTION, CONCENTRATE INTRAVENOUS at 03:14

## 2021-01-01 RX ADMIN — NOREPINEPHRINE BITARTRATE 20 MCG/MIN: 1 INJECTION, SOLUTION, CONCENTRATE INTRAVENOUS at 00:54

## 2021-01-01 RX ADMIN — MEROPENEM 1000 MG: 1 INJECTION, POWDER, FOR SOLUTION INTRAVENOUS at 10:47

## 2021-01-01 RX ADMIN — METRONIDAZOLE 500 MG: 500 INJECTION, SOLUTION INTRAVENOUS at 21:57

## 2021-01-01 RX ADMIN — CALCIUM GLUCONATE 1 G: 20 INJECTION, SOLUTION INTRAVENOUS at 06:24

## 2021-01-01 RX ADMIN — SODIUM CHLORIDE: 0.9 INJECTION, SOLUTION INTRAVENOUS at 21:45

## 2021-01-01 RX ADMIN — HEPARIN SODIUM 5000 UNITS: 5000 INJECTION INTRAVENOUS; SUBCUTANEOUS at 00:36

## 2021-01-01 RX ADMIN — CEPHALEXIN 500 MG: 500 CAPSULE ORAL at 20:47

## 2021-01-01 RX ADMIN — ALBUMIN (HUMAN) 50 G: 0.25 INJECTION, SOLUTION INTRAVENOUS at 00:35

## 2021-01-01 RX ADMIN — TETANUS TOXOID, REDUCED DIPHTHERIA TOXOID AND ACELLULAR PERTUSSIS VACCINE, ADSORBED 0.5 ML: 5; 2.5; 8; 8; 2.5 SUSPENSION INTRAMUSCULAR at 20:46

## 2021-01-01 RX ADMIN — NOREPINEPHRINE BITARTRATE 14 MCG: 1 INJECTION INTRAVENOUS at 22:30

## 2021-01-01 RX ADMIN — VASOPRESSIN 0.04 UNITS/MIN: 20 INJECTION INTRAVENOUS at 08:44

## 2021-01-23 NOTE — PROGRESS NOTES
Refill request for Zoloft Sent to [facility] request sent to [phone] [facility],[address]

## 2021-02-23 NOTE — PSYCH
Virtual Regular Visit    Problem List Items Addressed This Visit        Other    Major depressive disorder, recurrent, moderate ([address] ) - Primary    Anxiety disorder        Reason for visit is   Chief Complaint   Patient presents with    Anxiety    Depression    Medication Management    Pain     Encounter provider Maikol Jacques PhD    Provider located at [address] [place] [address]  [address]  [place] [address]  [phone]    Recent Visits  No visits were found meeting these conditions  Showing recent visits within past 7 days and meeting all other requirements     Today's Visits  Date Type Provider Dept   02/23/21 Telemedicine Maikol Jacques PhD [facility]   Showing today's visits and meeting all other requirements     Future Appointments  No visits were found meeting these conditions  Showing future appointments within next 150 days and meeting all other requirements        After connecting through [employer], the patient was identified by name and date of birth  Pranav Nelson was informed that this is a telemedicine visit and that the visit is being conducted through [employer] and patient was informed that this is not a secure, HIPAA-compliant platform  She agrees to proceed  My office door was closed  No one else was in the room  She acknowledged consent and understanding of privacy and security of the video platform  The patient has agreed to participate and understands they can discontinue the visit at any time  SUBJECTIVE:    Pranav Nelson is a 64 y o  female with a history of anxiety, depression, chronic back pain seen for medication management and therapy  Wanetta Lipoma was upset due to back surgery being cancelled due to insurance  The company wants her to have PT first; however, she has had PT in the past without improvement  Complained of severe back pain, has difficulty walking at times   Chest Xray was done to clear her for surgery and two nodules were found in her left lung  She was tearful, anxious and depressed  CAT scan was performed today and she will be told the results on Thursday the 25th  Discussed her disappointment in not having the surgery and possible lung cancer  Anxious about diagnosis and not moving to [place]  Denies SOB or chest pain  Denies SI   and family are supportive  Takes medication as prescribed      HPI ROS Appetite Changes and Sleep: normal appetite and decreased energy    Review Of Systems:     Mood Anxiety and Depression   Behavior Normal    Thought Content Normal   General Emotional Problems   Personality Normal   Other Psych Symptoms Normal   Constitutional Feeling Tired   ENT As Noted in HPI   Cardiovascular As Noted in HPI   Respiratory new nodules in left lung   Gastrointestinal As Noted in HPI   Genitourinary As Noted in HPI   Musculoskeletal As Noted in HPI   Integumentary As Noted in HPI   Neurological As Noted in HPI   Endocrine Normal    Other Symptoms Normal        Substance Abuse History:    Social History     Substance and Sexual Activity   Drug Use No       Family Psychiatric History:     Family History   Problem Relation Age of Onset    Hypertension Mother     Hypertension Father     Cardiomyopathy Maternal Grandmother        Social History     Socioeconomic History    Marital status: /Civil Union     Spouse name: Not on file    Number of children: Not on file    Years of education: Not on file    Highest education level: Not on file   Occupational History    Not on file   Social Needs    Financial resource strain: Not on file    Food insecurity     Worry: Not on file     Inability: Not on file    Transportation needs     Medical: Not on file     Non-medical: Not on file   Tobacco Use    Smoking status: Former Smoker     Packs/day: 1 00     Years: 40 00     Pack years: 40 00     Types: Cigarettes     Quit date: 8/18/2019     Years since quittin 5    Smokeless tobacco: Never Used   Substance and Sexual Activity    Alcohol use: Not Currently    Drug use: No    Sexual activity: Not on file   Lifestyle    Physical activity     Days per week: Not on file     Minutes per session: Not on file    Stress: Not on file   Relationships    Social connections     Talks on phone: Not on file     Gets together: Not on file     Attends Ep[employer]opalian service: Not on file     Active member of club or organization: Not on file     Attends meetings of clubs or organizations: Not on file     Relationship status: Not on file    Intimate partner violence     Fear of current or ex partner: Not on file     Emotionally abused: Not on file     Physically abused: Not on file     Forced sexual activity: Not on file   Other Topics Concern    Not on file   Social History Narrative    Not on file       Past Medical History:   Diagnosis Date    Arthritis     Depression     Scoliosis        Past Surgical History:   Procedure Laterality Date    APPENDECTOMY     [address] Right     knee    KNEE ARTHROSCOPY      LAST ASSESSED [id number]    NERVE BLOCK Right 2017    Procedure: [address] [place] L3, L4, L5;  Surgeon: Ruddy Bonilla MD;  Location: [facility] MAIN OR;  Service:     NERVE BLOCK Right 2016    Procedure: BLOCK MEDIAL BRANCH T9, T10, T11, T12;  Surgeon: Ruddy Bonilla MD;  Location: [facility] MAIN OR;  Service:     OOPHORECTOMY Bilateral     VT INJ DX/THER AGNT PARAVERT FACET [address] [employer], 1ST LEVEL Left 2017    Procedure: THORACIC MEDIAL BRANCH BLOCK T8-9, T10-11;  Surgeon: Ruddy Bonilla MD;  Location: [facility] MAIN OR;  Service: Pain Management     VT INJECTION,SACROILIAC JOINT Right 2017    Procedure: SI JOINT INJECTION;  Surgeon: Ruddy Bonilla MD;  Location: [facility] MAIN OR;  Service: Pain Management     RADIOFREQUENCY ABLATION Right 2/17/2017    Procedure: ABLATION RADIO FREQUENCY (RFA)  L3-4, L4-5, L5-S1;  Surgeon: Emma Stephens MD;  Location: [place] [address] MAIN OR;  Service:    [address] [place] [address] Right 9/23/2016    Procedure: ABLATION RADIO FREQUENCY (RFA)  T11, T12, L1, L2;  Surgeon: Emma Stephens MD;  Location: [place] [address] MAIN OR;  Service:     REPLACEMENT TOTAL KNEE      LAST ASSESSED [id number]       Current Outpatient Medications   Medication Sig Dispense Refill    ammonium lactate (LAC-HYDRIN) 12 % lotion Apply topically 2 (two) times a day as needed for dry skin 400 g 0    baclofen 10 mg tablet Take 10 mg by mouth 2 (two) times a day  0    Brexpiprazole (Rexulti) 3 MG tablet Take 1 tablet (3 mg total) by mouth daily 90 tablet 0    buPROPion (WELLBUTRIN XL) 300 mg 24 hr tablet daily      clonazePAM (KlonoPIN) 0 5 mg tablet Take 1 tablet (0 5 mg total) by mouth 2 (two) times a day 60 tablet 0    ergocalciferol (VITAMIN D2) 50,000 units take 1 capsule by mouth every week  FOR 6 WEEKS  0    ergocalciferol (VITAMIN D2) 50,000 units Take 1 capsule (50,000 Units total) by mouth once a week 12 capsule 0    fentaNYL (DURAGESIC) 50 mcg/hr Place 1 patch on the skin every third day Max Daily Amount: 1 patch 10 patch 0    furosemide (LASIX) 20 mg tablet Take 1 tablet (20 mg total) by mouth daily as needed (leg swelling) 30 tablet 1    iron polysaccharides (FERREX) 150 mg capsule Take 1 capsule (150 mg total) by mouth daily 90 capsule 1    mupirocin (BACTROBAN) 2 % ointment Apply topically 3 (three) times a day 22 g 0    oxyCODONE (ROXICODONE) 15 mg immediate release tablet take 1 tablet by mouth FIVE TIMES A DAY MAX 75 MG A DAY      pantoprazole (PROTONIX) 40 mg tablet Take 40 mg by mouth daily        potassium chloride (K-DUR,KLOR-CON) 10 mEq tablet Take 1 tablet (10 mEq total) by mouth daily as needed (take with furosemide for leg swelling) 30 tablet 1    pregabalin (LYRICA) 75 mg capsule Take 1 capsule (75 mg total) by mouth 3 (three) times a day 270 capsule 1    sertraline (ZOLOFT) 100 mg tablet TAKE 2 TABLETS BY MOUTH  DAILY 180 tablet 3     No current facility-administered medications for this visit  Allergies   Allergen Reactions    Vioxx [Rofecoxib] Rash       reviewed    OBJECTIVE:     Mental Status Examination:    Appearance adequate hygiene and grooming, good eye contact  and tearful, anxious   Mood depressed and anxious   Affect affect was tearful   Speech a normal rate   Thought Processes normal thought processes   Hallucinations no hallucinations present    Thought Content no delusions   Abnormal Thoughts no suicidal thoughts  and no homicidal thoughts    Orientation  oriented to person and place and time   Remote Memory short term memory intact and long term memory intact   Attention Span concentration intact   Intellect Appears to be of Average Intelligence   Insight Insight intact   Judgement judgment was intact   Muscle Strength decreased strength and difficulty walking due to pain   Language no difficulty naming common objects   Fund of Knowledge displays adequate knowledge of current events   Pain moderate to severe   Pain Scale 8       Laboratory Results: No results found for this or any previous visit  Assessment/Plan:       Diagnoses and all orders for this visit:    Major depressive disorder, recurrent, moderate (HCC)    Anxiety disorder, unspecified type          Treatment Recommendations- Risks Benefits      Immediate Medical/Psychiatric/Psychotherapy Treatments and Any Precautions: Reviewed medication, discussed possible diagnosis, coping skills were discussed  Encouraged verbalization of feelings which were validated and normalized   Support provided    Risks, Benefits And Possible Side Effects Of Medications:  {PSYCH RISK, BENEFITS AND POSSIBLE SIDE EFFECTS (Optional):39589    Controlled Medication Discussion: Discussed with patient the risks of sedation, respiratory depression, impairment of ability to drive and potential for abuse and addiction related to treatment with benzodiazepine medications  The patient understands risk of treatment with benzodiazepine medications, agrees to not drive if feels impaired and agrees to take medications as prescribed  and she is aware of safe use and storage of medication     Psychotherapy Provided: coping skills, support, encouraged verbalization of feelings    Goals discussed in session: improve anxiety and depression    Counseling provided: 45    Treatment Plan:    Completed and signed during the session: Not applicable - Treatment Plan not due at this session enacted 7/15/2020, updated 10/14/2020     I spent 40 minutes with the patient during this visit and 5 min reviewing Epic for all data/events relevant to this visit, composing this note and conducting other visit follow up activities      Betito Christine, PhD 02/23/21

## 2021-03-04 PROBLEM — M54.9 CHRONIC BACK PAIN: Status: ACTIVE | Noted: 2021-01-01

## 2021-03-04 PROBLEM — R91.8 MASS OF UPPER LOBE OF LEFT LUNG: Status: ACTIVE | Noted: 2021-01-01

## 2021-03-04 PROBLEM — E66.9 OBESITY (BMI 30.0-34.9): Status: ACTIVE | Noted: 2021-01-01

## 2021-03-04 PROBLEM — F17.200 TOBACCO DEPENDENCE: Status: ACTIVE | Noted: 2020-09-15

## 2021-03-04 PROBLEM — Z72.0 TOBACCO ABUSE: Status: ACTIVE | Noted: 2020-09-15

## 2021-03-04 PROBLEM — G89.29 CHRONIC BACK PAIN: Status: ACTIVE | Noted: 2021-01-01

## 2021-03-04 NOTE — LETTER
March 4, 2021     Destinee Quintanilla MD  [address] [facility]  [address] [facility] [address]    Patient: Joanna Chew   YOB: 1960   Date of Visit: 3/4/2021       Dear Dr Vivian Gonzales:    Thank you for referring Jorje Randle to me for evaluation  Below are my notes for this consultation  If you have questions, please do not hesitate to call me  I look forward to following your patient along with you  Sincerely,        Jameson Hollis MD        CC: No Recipients  Jameson Hollis MD  3/4/2021  8:56 PM  Sign when Signing Visit  Assessment/Plan:    Obesity (BMI 30 0-34  9)  She is mildly obese  She had bariatric surgery before  Tobacco dependence  She has been a smoker for a long time; 1 and half pack per day for more than 40 years  She states that she had quit for few months but restarted now  She is not on any nicotine patch  I counseled her to quit smoking completely  Mass of upper lobe of left lung  Ms Jorje Randle has chronic back pain and was scheduled for lumbar disc surgery for chronic degenerative disc disease  Her chest x-ray done prior to the evaluation showed a left upper lobe opacity  She subsequently underwent a CT scan of the chest on 02/23/2021 which showed a 2 6 x 3 3 x 3 5 cm spiculated mass lesion in the left upper lobe highly suspicious for primary lung neoplasm  There were no enlarged mediastinal or hilar lymph nodes  Her clinical examination was unremarkable  She needs a CT PET scan for further evaluation of this lung mass and a CT-guided biopsy later depending on the PET scan results  We will await the CT PET scan results before clearing her for surgery  She will be given the option to go ahead with the surgery for her back now or wait for treatment for her suspected lung neoplasm  I have also ordered a full PFT  I had a long discussion with the patient as well as her  and I have answered all their questions      Chronic bilateral low back pain without sciatica  She has history of chronic back pain for more than 15 years secondary to degenerative lumbar disc disease  She is on chronic pain and is on opioids  She states that the chronic pain significantly interferes with her daily life and activities and sleep  She is strongly hoping that the surgery would cure her chronic back pain  We will await the CT PET scan to see whether she has any abnormal focus anywhere related to her lung mass  I had a long discussion with her and her   She was upset that her surgery could be delayed because of her lung finding  Diagnoses and all orders for this visit:    Mass of upper lobe of left lung  -     Cancel: NM PET CT skull base to mid thigh; Future  -     Complete PFT with post Bronchodilator and Six Minute walk; Future    Tobacco dependence  -     Cancel: NM PET CT skull base to mid thigh; Future  -     Complete PFT with post Bronchodilator and Six Minute walk; Future    Chronic bilateral low back pain with bilateral sciatica    Obesity (BMI 30 0-34 9)    Other orders  -     sulfamethoxazole-trimethoprim (BACTRIM DS) 800-160 mg per tablet; take 1 tablet by mouth twice a day for 3 days  -     phenazopyridine (PYRIDIUM) 100 mg tablet; take 1 tablet by mouth three times a day if needed  FOR BLADDER SPASMS  -     fentaNYL (DURAGESIC) 75 mcg/hr; PLACE 1 PATCH ON THE SKIN EVERY THIRD DAY  MAX DAILY AMOUNT  1 PATCH  -     cyclobenzaprine (FLEXERIL) 10 mg tablet; Take 10 mg by mouth Three times daily as needed          Subjective:      Patient ID: Sae Quesada is a 64 y o  female  [place] has chronic back pain for more than 15 years and was planning to get back surgery in 2 weeks time    She was trying to get preoperative clearance and her preoperative chest x-ray reportedly showed a left upper lobe opacity which on subsequent CT scan showed to be a 2 6 x 3 3 x 3 5 cm spiculated left upper lobe spiculated lung mass highly suspicious for primary lung malignancy  There were no mediastinal or hilar node involvement or any masses elsewhere according to report  She has been referred by her primary care physician regarding the lung mass  She denies any weight loss or anorexia  She denies any hemoptysis shortness of breath cough or phlegm  She also has no wheezing  She is not on any oxygen and she is not using any inhaler  Her daily activities  are significantly restricted by the chronic pain  The severe pain is also interfering with her sleep  She is on oxycodone  She denies any previous history of COPD or bronchial asthma  She has been a smoker for a long time; 1 and half pack per day for more than 40 years  She states that she had quit for few months but restarted now  She is not on any nicotine patch  I counseled her to quit smoking completely  The patient is upset that she cannot have the surgery with the preoperative clearance from today  The following portions of the patient's history were reviewed and updated as appropriate: allergies, current medications, past family history, past medical history, past social history, past surgical history and problem list     Review of Systems   Constitutional: Negative for appetite change, chills, fever and unexpected weight change  HENT: Negative for hearing loss, rhinorrhea, sneezing, sore throat, trouble swallowing and voice change  Eyes: Negative for visual disturbance  Respiratory: Negative for cough, chest tightness, shortness of breath, wheezing and stridor  Cardiovascular: Positive for leg swelling  Negative for chest pain and palpitations  Gastrointestinal: Negative for abdominal pain, constipation, diarrhea, nausea and vomiting  Endocrine: Negative for polyuria  Genitourinary: Negative for dysuria, frequency and urgency  Musculoskeletal: Positive for arthralgias and back pain  Skin: Negative for rash     Neurological: Negative for dizziness, seizures, syncope, light-headedness and headaches  Psychiatric/Behavioral: Positive for sleep disturbance  The patient is nervous/anxious  Objective:      /78   Pulse 91   Temp (!) 97 4 °F (36 3 °C) (Temporal)   Resp 12   Ht 5' 2" (1 575 m)   Wt 82 6 kg (182 lb)   SpO2 96%   BMI 33 29 kg/m²          Physical Exam  Vitals signs reviewed  Constitutional:       General: She is not in acute distress  Appearance: She is obese  She is not ill-appearing, toxic-appearing or diaphoretic  HENT:      Head: Normocephalic  Eyes:      General: No scleral icterus  Conjunctiva/sclera: Conjunctivae normal    Neck:      Musculoskeletal: No neck rigidity  Cardiovascular:      Rate and Rhythm: Normal rate and regular rhythm  Heart sounds: Normal heart sounds  No murmur  Pulmonary:      Effort: Pulmonary effort is normal  No respiratory distress  Breath sounds: Normal breath sounds  No stridor  No wheezing, rhonchi or rales  Chest:      Chest wall: No tenderness  Abdominal:      General: Bowel sounds are normal       Palpations: Abdomen is soft  Tenderness: There is no abdominal tenderness  Musculoskeletal:      Right lower leg: No edema  Left lower leg: No edema  Lymphadenopathy:      Cervical: No cervical adenopathy  Skin:     Coloration: Skin is not jaundiced or pale  Neurological:      Mental Status: She is alert and oriented to person, place, and time  Gait: Gait normal    Psychiatric:         Mood and Affect: Mood normal          Behavior: Behavior normal          Thought Content:  Thought content normal          Judgment: Judgment normal

## 2021-03-04 NOTE — PROGRESS NOTES
Assessment/Plan:    Obesity (BMI 30 0-34  9)  She is mildly obese  She had bariatric surgery before  Tobacco dependence  She has been a smoker for a long time; 1 and half pack per day for more than 40 years  She states that she had quit for few months but restarted now  She is not on any nicotine patch  I counseled her to quit smoking completely  Mass of upper lobe of left lung  Ms Shelia Downs has chronic back pain and was scheduled for lumbar disc surgery for chronic degenerative disc disease  Her chest x-ray done prior to the evaluation showed a left upper lobe opacity  She subsequently underwent a CT scan of the chest on 02/23/2021 which showed a 2 6 x 3 3 x 3 5 cm spiculated mass lesion in the left upper lobe highly suspicious for primary lung neoplasm  There were no enlarged mediastinal or hilar lymph nodes  Her clinical examination was unremarkable  She needs a CT PET scan for further evaluation of this lung mass and a CT-guided biopsy later depending on the PET scan results  We will await the CT PET scan results before clearing her for surgery  She will be given the option to go ahead with the surgery for her back now or wait for treatment for her suspected lung neoplasm  I have also ordered a full PFT  I had a long discussion with the patient as well as her  and I have answered all their questions  Chronic bilateral low back pain without sciatica  She has history of chronic back pain for more than 15 years secondary to degenerative lumbar disc disease  She is on chronic pain and is on opioids  She states that the chronic pain significantly interferes with her daily life and activities and sleep  She is strongly hoping that the surgery would cure her chronic back pain  We will await the CT PET scan to see whether she has any abnormal focus anywhere related to her lung mass  I had a long discussion with her and her     She was upset that her surgery could be delayed because of her lung finding  Diagnoses and all orders for this visit:    Mass of upper lobe of left lung  -     Cancel: NM PET CT skull base to mid thigh; Future  -     Complete PFT with post Bronchodilator and Six Minute walk; Future    Tobacco dependence  -     Cancel: NM PET CT skull base to mid thigh; Future  -     Complete PFT with post Bronchodilator and Six Minute walk; Future    Chronic bilateral low back pain with bilateral sciatica    Obesity (BMI 30 0-34 9)    Other orders  -     sulfamethoxazole-trimethoprim (BACTRIM DS) 800-160 mg per tablet; take 1 tablet by mouth twice a day for 3 days  -     phenazopyridine (PYRIDIUM) 100 mg tablet; take 1 tablet by mouth three times a day if needed  FOR BLADDER SPASMS  -     fentaNYL (DURAGESIC) 75 mcg/hr; PLACE 1 PATCH ON THE SKIN EVERY THIRD DAY  MAX DAILY AMOUNT  1 PATCH  -     cyclobenzaprine (FLEXERIL) 10 mg tablet; Take 10 mg by mouth Three times daily as needed          Subjective:      Patient ID: Joanna Chew is a 64 y o  female  [place] Harps has chronic back pain for more than 15 years and was planning to get back surgery in 2 weeks time  She was trying to get preoperative clearance and her preoperative chest x-ray reportedly showed a left upper lobe opacity which on subsequent CT scan showed to be a 2 6 x 3 3 x 3 5 cm spiculated left upper lobe spiculated lung mass highly suspicious for primary lung malignancy  There were no mediastinal or hilar node involvement or any masses elsewhere according to report  She has been referred by her primary care physician regarding the lung mass  She denies any weight loss or anorexia  She denies any hemoptysis shortness of breath cough or phlegm  She also has no wheezing  She is not on any oxygen and she is not using any inhaler  Her daily activities  are significantly restricted by the chronic pain  The severe pain is also interfering with her sleep  She is on oxycodone    She denies any previous history of COPD or bronchial asthma  She has been a smoker for a long time; 1 and half pack per day for more than 40 years  She states that she had quit for few months but restarted now  She is not on any nicotine patch  I counseled her to quit smoking completely  The patient is upset that she cannot have the surgery with the preoperative clearance from today  The following portions of the patient's history were reviewed and updated as appropriate: allergies, current medications, past family history, past medical history, past social history, past surgical history and problem list     Review of Systems   Constitutional: Negative for appetite change, chills, fever and unexpected weight change  HENT: Negative for hearing loss, rhinorrhea, sneezing, sore throat, trouble swallowing and voice change  Eyes: Negative for visual disturbance  Respiratory: Negative for cough, chest tightness, shortness of breath, wheezing and stridor  Cardiovascular: Positive for leg swelling  Negative for chest pain and palpitations  Gastrointestinal: Negative for abdominal pain, constipation, diarrhea, nausea and vomiting  Endocrine: Negative for polyuria  Genitourinary: Negative for dysuria, frequency and urgency  Musculoskeletal: Positive for arthralgias and back pain  Skin: Negative for rash  Neurological: Negative for dizziness, seizures, syncope, light-headedness and headaches  Psychiatric/Behavioral: Positive for sleep disturbance  The patient is nervous/anxious  Objective:      /78   Pulse 91   Temp (!) 97 4 °F (36 3 °C) (Temporal)   Resp 12   Ht 5' 2" (1 575 m)   Wt 82 6 kg (182 lb)   SpO2 96%   BMI 33 29 kg/m²          Physical Exam  Vitals signs reviewed  Constitutional:       General: She is not in acute distress  Appearance: She is obese  She is not ill-appearing, toxic-appearing or diaphoretic  HENT:      Head: Normocephalic     Eyes:      General: No scleral icterus  Conjunctiva/sclera: Conjunctivae normal    Neck:      Musculoskeletal: No neck rigidity  Cardiovascular:      Rate and Rhythm: Normal rate and regular rhythm  Heart sounds: Normal heart sounds  No murmur  Pulmonary:      Effort: Pulmonary effort is normal  No respiratory distress  Breath sounds: Normal breath sounds  No stridor  No wheezing, rhonchi or rales  Chest:      Chest wall: No tenderness  Abdominal:      General: Bowel sounds are normal       Palpations: Abdomen is soft  Tenderness: There is no abdominal tenderness  Musculoskeletal:      Right lower leg: No edema  Left lower leg: No edema  Lymphadenopathy:      Cervical: No cervical adenopathy  Skin:     Coloration: Skin is not jaundiced or pale  Neurological:      Mental Status: She is alert and oriented to person, place, and time  Gait: Gait normal    Psychiatric:         Mood and Affect: Mood normal          Behavior: Behavior normal          Thought Content:  Thought content normal          Judgment: Judgment normal

## 2021-03-04 NOTE — ASSESSMENT & PLAN NOTE
She has been a smoker for a long time; 1 and half pack per day for more than 40 years  She states that she had quit for few months but restarted now  She is not on any nicotine patch  I counseled her to quit smoking completely

## 2021-03-05 NOTE — ASSESSMENT & PLAN NOTE
Amrit Sellers has chronic back pain and was scheduled for lumbar disc surgery for chronic degenerative disc disease  Her chest x-ray done prior to the evaluation showed a left upper lobe opacity  She subsequently underwent a CT scan of the chest on 02/23/2021 which showed a 2 6 x 3 3 x 3 5 cm spiculated mass lesion in the left upper lobe highly suspicious for primary lung neoplasm  There were no enlarged mediastinal or hilar lymph nodes  Her clinical examination was unremarkable  She needs a CT PET scan for further evaluation of this lung mass and a CT-guided biopsy later depending on the PET scan results  We will await the CT PET scan results before clearing her for surgery  She will be given the option to go ahead with the surgery for her back now or wait for treatment for her suspected lung neoplasm  I have also ordered a full PFT  I had a long discussion with the patient as well as her  and I have answered all their questions

## 2021-03-05 NOTE — ASSESSMENT & PLAN NOTE
She has history of chronic back pain for more than 15 years secondary to degenerative lumbar disc disease  She is on chronic pain and is on opioids  She states that the chronic pain significantly interferes with her daily life and activities and sleep  She is strongly hoping that the surgery would cure her chronic back pain  We will await the CT PET scan to see whether she has any abnormal focus anywhere related to her lung mass  I had a long discussion with her and her   She was upset that her surgery could be delayed because of her lung finding

## 2021-03-10 NOTE — PROGRESS NOTES
Telephone to [facility] re: request for medication to treat anxiety   No answer, message left requesting a return call

## 2021-03-11 NOTE — TELEPHONE ENCOUNTER
Dr Ken Bee asked me to reach out to patient to bring her into the office today at 2pm for a follow up visit and to do a spirometry on her  Spoke to patient and she declined to come in stated she got a second opinion at [facility] and she will be going with them  I asked the patient if she will be returning to see Dr Ken Bee and she stated no  Provider informed

## 2021-03-29 NOTE — TELEPHONE ENCOUNTER
Telephone call to [facility] re: anxiety medications  Discussed options; however, she is one multiple opioids and antidepressants  Not advisable to add more medication  Support was provided  She informed me she fell down a flight of stairs and fractured her collar bone  Support given  She does not know how it happened

## 2021-05-24 NOTE — BH TREATMENT PLAN
TREATMENT PLAN (Medication Management Only)         [facility]     Name and Date of Sunnyvidal Rg  1960  Date of Treatment Plan: July 15, 2020, October 14, 2020, May 24,2021  Diagnosis/Diagnoses:    1  Major depressive disorder, recurrent, moderate (HCC)       Strengths/Personal Resources for Self-Care: supportive family, taking medications as prescribed, ability to communicate needs, ability to listen  Area/Areas of need (in own words): anxiety symptoms, depressive symptoms  1          Long Term Goal: improve anxiety and depressive symptoms  Target Date: 6 months - 11/24/2021  Person/Persons responsible for completion of goal: Hafsa and provider  2          Short Term Objective (s) - How will we reach this goal?:   A  Provider new recommended medication/dosage changes and/or continue medication(s): continue current medications as prescribed  Vistaril for anxiety prn  B  coping with chronic pain  C  reframing automatic negative thoughts  Target Date: 6 months - 11/24/2021  Person/Persons Responsible for Completion of Goal: Hafsa and provider  Progress Towards Goals: progressing slowly  Treatment Modality: medication management every 1-3 months  Review due 180 days from date of this plan: 6 months - 11/24/2021  Expected length of service: maintenance  My Physician/PA/NP and I have developed this plan together and I agree to work on the goals and objectives  I understand the treatment goals that were developed for my treatment

## 2021-05-25 NOTE — PSYCH
Virtual Regular Visit    Problem List Items Addressed This Visit        Other    Current moderate episode of major depressive disorder without prior episode (HCC)    Relevant Medications    hydrOXYzine pamoate (VISTARIL) 25 mg capsule    Brexpiprazole (Rexulti) 3 MG tablet    Major depressive disorder, recurrent, moderate (HCC) - Primary    Relevant Medications    hydrOXYzine pamoate (VISTARIL) 25 mg capsule    Brexpiprazole (Rexulti) 3 MG tablet    Anxiety disorder    Relevant Medications    hydrOXYzine pamoate (VISTARIL) 25 mg capsule    Brexpiprazole (Rexulti) 3 MG tablet      Other Visit Diagnoses     Anxiety        Relevant Medications    clonazePAM (KlonoPIN) 0 5 mg tablet        Reason for visit is   Chief Complaint   Patient presents with   [facility] Depression    Medication Management     Encounter provider Dawna Parkinson, PhD    Provider located at [address] [place], [place] [address]  [place] [address]  [phone]    Recent Visits  Date Type Provider Dept   05/24/21 Telemedicine Dawna Parkinson, PhD [place] recent visits within past 7 days and meeting all other requirements     Future Appointments  No visits were found meeting these conditions  Showing future appointments within next 150 days and meeting all other requirements        After connecting through [employer], the patient was identified by name and date of birth  Guerlinedereck Tatum was informed that this is a telemedicine visit and that the visit is being conducted through phone due to not being able to connect on [employer] and patient was informed that this is not a secure, HIPAA-compliant platform  She agrees to proceed  My office door was closed  No one else was in the room  She acknowledged consent and understanding of privacy and security of the video platform   The patient has agreed to participate and understands they can discontinue the visit at any time  SUBJECTIVE:    Niki Mercado is a 64 y o  female with a history of anxiety and depression  Chronic pain seen for medication mangement  Caterinaveronica Courser reports falling down the stairs, fractured her left arm, clavicle and sustained a concussion with brain bleed  She is discouraged  Has had 5 radiation treatments for lung CA  Reports planning to have back surgery, fusion of L2-L5  Reports she is not sleeping and has increased anxiety  Appetite is good  Takes medication as prescribed  Family are supportive   Depressed, denies SI    HPI ROS Appetite Changes and Sleep: normal appetite and decreased energy    Review Of Systems:     Mood Anxiety and Depression   Behavior Normal    Thought Content Normal   General Emotional Problems and Sleep Disturbances   Personality Normal   Other Psych Symptoms Normal   Constitutional As Noted in HPI   ENT As Noted in HPI   Cardiovascular As Noted in HPI   Respiratory As Noted in HPI   Gastrointestinal As Noted in HPI   Genitourinary As Noted in HPI   Musculoskeletal As Noted in HPI   Integumentary As Noted in HPI   Neurological As Noted in HPI   Endocrine Normal    Other Symptoms Normal        Substance Abuse History:    Social History     Substance and Sexual Activity   Drug Use No       Family Psychiatric History:     Family History   Problem Relation Age of Onset    Hypertension Mother     Hypertension Father     Cardiomyopathy Maternal Grandmother        Social History     Socioeconomic History    Marital status: /Civil Union     Spouse name: Not on file    Number of children: Not on file    Years of education: Not on file    Highest education level: Not on file   Occupational History    Not on file   Social Needs    Financial resource strain: Not on file    Food insecurity     Worry: Not on file     Inability: Not on file    Transportation needs     Medical: Not on file     Non-medical: Not on file   Tobacco Use    Smoking status: Former Smoker     Packs/day: 1 00     Years: 40 00     Pack years: 40 00     Types: Cigarettes     Quit date: 2019     Years since quittin 7    Smokeless tobacco: Never Used   Substance and Sexual Activity    Alcohol use: Not Currently    Drug use: No    Sexual activity: Not on file   Lifestyle    Physical activity     Days per week: Not on file     Minutes per session: Not on file    Stress: Not on file   Relationships    Social connections     Talks on phone: Not on file     Gets together: Not on file     Attends Evangelical service: Not on file     Active member of club or organization: Not on file     Attends meetings of clubs or organizations: Not on file     Relationship status: Not on file    Intimate partner violence     Fear of current or ex partner: Not on file     Emotionally abused: Not on file     Physically abused: Not on file     Forced sexual activity: Not on file   Other Topics Concern    Not on file   Social History Narrative    Not on file       Past Medical History:   Diagnosis Date    Arthritis     Depression     Scoliosis        Past Surgical History:   Procedure Laterality Date    APPENDECTOMY      [address] Right     knee    KNEE ARTHROSCOPY      LAST ASSESSED [id number]    NERVE BLOCK Right 2017    Procedure: [address] L3, L4, L5;  Surgeon: Kamilah Greco MD;  Location: [facility] MAIN OR;  Service:     NERVE BLOCK Right 2016    Procedure: BLOCK MEDIAL BRANCH T9, T10, T11, T12;  Surgeon: Kamilah Greco MD;  Location: [facility] MAIN OR;  Service:     OOPHORECTOMY Bilateral     [place] INJ DX/THER AGNT PARAVERT FACET [address], 1ST LEVEL Left 2017    Procedure: THORACIC MEDIAL BRANCH BLOCK T8-9, T10-11;  Surgeon: Kamilah Greco MD;  Location[facility] MAIN OR;  Service: Pain Management     [place] INJECTION,SACROILIAC JOINT Right 6/16/2017    Procedure: SI JOINT INJECTION;  Surgeon: Lashawn Vigil MD;  Location: [facility] MAIN OR;  Service: Pain Management     RADIOFREQUENCY ABLATION Right 2/17/2017    Procedure: ABLATION RADIO FREQUENCY (RFA)  L3-4, L4-5, L5-S1;  Surgeon: Lashawn Vigil MD;  Location: [place] [address] MAIN OR;  Service:    Darcy  Right 9/23/2016    Procedure: ABLATION RADIO FREQUENCY (RFA)  T11, T12, L1, L2;  Surgeon: Lashawn Vigil MD;  Location: [place] [address] MAIN OR;  Service:     REPLACEMENT TOTAL KNEE      LAST ASSESSED [id number]       Current Outpatient Medications   Medication Sig Dispense Refill    Brexpiprazole (Rexulti) 3 MG tablet Take 1 tablet (3 mg total) by mouth daily 90 tablet 0    buPROPion (WELLBUTRIN XL) 300 mg 24 hr tablet daily      clonazePAM (KlonoPIN) 0 5 mg tablet Take 1 tablet (0 5 mg total) by mouth 2 (two) times a day 60 tablet 0    cyclobenzaprine (FLEXERIL) 10 mg tablet Take 10 mg by mouth Three times daily as needed      fentaNYL (DURAGESIC) 50 mcg/hr Place 1 patch on the skin every third day Max Daily Amount: 1 patch 10 patch 0    fentaNYL (DURAGESIC) 75 mcg/hr PLACE 1 PATCH ON THE SKIN EVERY THIRD DAY   MAX DAILY AMOUNT  1 PATCH      furosemide (LASIX) 20 mg tablet Take 1 tablet (20 mg total) by mouth daily as needed (leg swelling) 30 tablet 1    hydrOXYzine pamoate (VISTARIL) 25 mg capsule Take 1 capsule (25 mg total) by mouth 3 (three) times a day as needed for anxiety 30 capsule 0    oxyCODONE (ROXICODONE) 15 mg immediate release tablet take 1 tablet by mouth FIVE TIMES A DAY MAX 75 MG A DAY      phenazopyridine (PYRIDIUM) 100 mg tablet take 1 tablet by mouth three times a day if needed  FOR BLADDER SPASMS      pregabalin (LYRICA) 75 mg capsule Take 1 capsule (75 mg total) by mouth 3 (three) times a day 270 capsule 1    sertraline (ZOLOFT) 100 mg tablet Take 2 tablets (200 mg total) by mouth daily 180 tablet 0    sulfamethoxazole-trimethoprim (BACTRIM DS) 800-160 mg per tablet take 1 tablet by mouth twice a day for 3 days       No current facility-administered medications for this visit  Allergies   Allergen Reactions    Vioxx [Rofecoxib] Rash       The following portions of the patient's history were reviewed and updated as appropriate: allergies, current medications, past family history, past medical history, past social history, past surgical history and problem list     OBJECTIVE:     Mental Status Examination:    Appearance phone session   Mood depressed and anxious   Affect phone session   Speech a normal rate   Thought Processes normal thought processes   Hallucinations no hallucinations present    Thought Content no delusions   Abnormal Thoughts no suicidal thoughts  and no homicidal thoughts    Orientation  oriented to person and place and time   Remote Memory short term memory intact and long term memory intact   Attention Span concentration intact   Intellect Appears to be of Average Intelligence   Insight Insight intact   Judgement judgment was intact   Muscle Strength reports weakness and gait problems due to pain   Language no difficulty naming common objects   Fund of Knowledge displays adequate knowledge of current events   Pain moderate to severe   Pain Scale 5       Laboratory Results: No results found for this or any previous visit  Assessment/Plan:       Diagnoses and all orders for this visit:    Major depressive disorder, recurrent, moderate (HCC)  -     Brexpiprazole (Rexulti) 3 MG tablet; Take 1 tablet (3 mg total) by mouth daily    Current moderate episode of major depressive disorder without prior episode (HCC)    Generalized anxiety disorder  -     hydrOXYzine pamoate (VISTARIL) 25 mg capsule; Take 1 capsule (25 mg total) by mouth 3 (three) times a day as needed for anxiety    Anxiety  -     clonazePAM (KlonoPIN) 0 5 mg tablet;  Take 1 tablet (0 5 mg total) by mouth 2 (two) times a day          Treatment Recommendations- Risks Benefits Immediate Medical/Psychiatric/Psychotherapy Treatments and Any Precautions: Reviewed medications, vistaril ordered for anxiety  Risks, Benefits And Possible Side Effects Of Medications:  Vistaril    Controlled Medication Discussion: Discussed with patient the risks of sedation, respiratory depression, impairment of ability to drive and potential for abuse and addiction related to treatment with benzodiazepine medications  The patient understands risk of treatment with benzodiazepine medications, agrees to not drive if feels impaired and agrees to take medications as prescribed   and she is aware of safe use and storage of medication     Psychotherapy Provided: support    Goals discussed in session:reduce anxiety       Treatment Plan:    Completed and signed during the session: Yes - Treatment Plan done but not signed at time of office visit due to:  Plan reviewed by phone and verbal consent given due to [address] distancing enacted 7/15/2020, updated 10/14/2020, 5/24/2021    Ana Bolaños, PhD 05/25/21

## 2021-05-25 NOTE — PATIENT INSTRUCTIONS
Continue medications  Call with problems or concerns  Vistaril 25 mg Three times a day if necessary for anxiety

## 2021-06-21 PROBLEM — F41.1 GAD (GENERALIZED ANXIETY DISORDER): Status: ACTIVE | Noted: 2021-01-01

## 2021-06-22 NOTE — PSYCH
This note was not shared with the patient due to this is a psychotherapy note  Virtual Regular Visit    Problem List Items Addressed This Visit        Other    Major depressive disorder, recurrent, moderate (HCC) - Primary    Relevant Medications    buPROPion (WELLBUTRIN XL) 300 mg 24 hr tablet    hydrOXYzine pamoate (VISTARIL) 25 mg capsule    sertraline (ZOLOFT) 100 mg tablet    Anxiety disorder    Relevant Medications    buPROPion (WELLBUTRIN XL) 300 mg 24 hr tablet    hydrOXYzine pamoate (VISTARIL) 25 mg capsule    sertraline (ZOLOFT) 100 mg tablet    SAMMY (generalized anxiety disorder)    Relevant Medications    buPROPion (WELLBUTRIN XL) 300 mg 24 hr tablet    hydrOXYzine pamoate (VISTARIL) 25 mg capsule    sertraline (ZOLOFT) 100 mg tablet        Reason for visit is   Chief Complaint   Patient presents with   [facility] Depression    Medication Management     Encounter provider Junior Khanh PhD    Provider located at [address] [place], [place] [address]  [place] [address] [phone]    Recent Visits  Date Type Provider Dept   06/21/21 Telemedicine PhD Dennis [place] recent visits within past 7 days and meeting all other requirements  Future Appointments  No visits were found meeting these conditions  Showing future appointments within next 150 days and meeting all other requirements       After connecting through [employer], the patient was identified by name and date of birth  Memo Bee was informed that this is a telemedicine visit and that the visit is being conducted through [employer] (air [employer]) and patient was informed that this is not a secure, HIPAA-compliant platform  She agrees to proceed  My office door was closed  No one else was in the room  She acknowledged consent and understanding of privacy and security of the video platform   The patient has agreed to participate and understands they can discontinue the visit at any time  SUBJECTIVE:    Tasha Reynolds is a 64 y o  female with a history of anxiety, depression, chronic pain seen for medication management and mood evaluation  Danica Pabon reports she is going to have surgery on her back  The lung cancer has shrunk and she continues to receive treatment  Upset due to how youngest son treats her and has anxiety regarding health  Appetite is reported as good and she is sleeping at night  No further falls reported  Takes medication as prescribed  Pain is constant      HPI ROS Appetite Changes and Sleep: normal appetite and decreased energy    Review Of Systems:     Mood Anxiety and Depression   Behavior Normal    Thought Content Normal   General Emotional Problems   Personality Normal   Other Psych Symptoms Normal   Constitutional As Noted in HPI   ENT As Noted in HPI   Cardiovascular As Noted in HPI   Respiratory As Noted in HPI   Gastrointestinal As Noted in HPI   Genitourinary As Noted in HPI   Musculoskeletal As Noted in HPI   Integumentary As Noted in HPI   Neurological As Noted in HPI   Endocrine Normal    Other Symptoms Normal        Substance Abuse History:    Social History     Substance and Sexual Activity   Drug Use No       Family Psychiatric History:     Family History   Problem Relation Age of Onset    Hypertension Mother     Hypertension Father     Cardiomyopathy Maternal Grandmother        Social History     Socioeconomic History    Marital status: /Civil Union     Spouse name: Delroy Banks Number of children: 3    Years of education: Not on file    Highest education level: Not on file   Occupational History    Not on file   Tobacco Use    Smoking status: Former Smoker     Packs/day: 1 00     Years: 40 00     Pack years: 40 00     Types: Cigarettes     Quit date: 2019     Years since quittin 8    Smokeless tobacco: Never Used   Vaping Use    Vaping Use: Never used   Substance and Sexual Activity    Alcohol use: Not Currently    Drug use: No    Sexual activity: Not Currently     Partners: Male   Other Topics Concern    Not on file   Social History Narrative    Not on file     Social Determinants of Health     Financial Resource Strain:     Difficulty of Paying Living Expenses:    Food Insecurity:     Worried About Running Out of Food in the Last Year:     [address] in the Last Year:    Transportation Needs:     Lack of Transportation (Medical):      Lack of Transportation (Non-Medical):    Physical Activity:     Days of Exercise per Week:     Minutes of Exercise per Session:    Stress:     Feeling of Stress :    Social Connections:     Frequency of Communication with Friends and Family:     Frequency of Social Gatherings with Friends and Family:     Attends Hinduism Services:     Active Member of Clubs or Organizations:     Attends Club or Organization Meetings:     Marital Status:    Intimate Partner Violence:     Fear of Current or Ex-Partner:     Emotionally Abused:     Physically Abused:     Sexually Abused:        Past Medical History:   Diagnosis Date    Anxiety     Arthritis     Depression     Panic attack     Scoliosis        Past Surgical History:   Procedure Laterality Date    APPENDECTOMY      CHOLECYSTECTOMY      DILATION AND CURETTAGE OF UTERUS      GASTRIC BYPASS      HYSTERECTOMY      JOINT REPLACEMENT Right     knee    KNEE ARTHROSCOPY      LAST ASSESSED [id number]    NERVE BLOCK Right 1/6/2017    Procedure: LUMBAR MEDIAL BRANCH BLOCK L3, L4, L5;  Surgeon: Giuseppe Melo MD;  Location: [place] [address] MAIN OR;  Service:     NERVE BLOCK Right 8/12/2016    Procedure: BLOCK MEDIAL BRANCH T9, T10, T11, T12;  Surgeon: Giuseppe Melo MD;  Location: [place] [address] MAIN OR;  Service:     OOPHORECTOMY Bilateral     [place] INJ DX/THER AGNT PARAVERT FACET Valerie Rodas, 1ST LEVEL Left 4/28/2017    Procedure: THORACIC MEDIAL BRANCH BLOCK T8-9, T10-11;  Surgeon: Chrissie Rinne Iman Cade MD;  Location: [facility] MAIN OR;  Service: Pain Management     [place] INJECTION,SACROILIAC JOINT Right 6/16/2017    Procedure: SI JOINT INJECTION;  Surgeon: Cristin Whiteside MD;  Location: [facility] MAIN OR;  Service: Pain Management     RADIOFREQUENCY ABLATION Right 2/17/2017    Procedure: ABLATION RADIO FREQUENCY (RFA)  L3-4, L4-5, L5-S1;  Surgeon: Cristin Whiteside MD;  Location: [facility] MAIN OR;  Service:    Naye Salinasn Right 9/23/2016    Procedure: ABLATION RADIO FREQUENCY (RFA)  T11, T12, L1, L2;  Surgeon: Cristin Whiteside MD;  [facility] MAIN OR;  Service:     REPLACEMENT TOTAL KNEE      LAST ASSESSED [id number]       Current Outpatient Medications   Medication Sig Dispense Refill    Brexpiprazole (Rexulti) 3 MG tablet Take 1 tablet (3 mg total) by mouth daily 90 tablet 0    buPROPion (WELLBUTRIN XL) 300 mg 24 hr tablet Take 1 tablet (300 mg total) by mouth daily 90 tablet 0    clonazePAM (KlonoPIN) 0 5 mg tablet Take 1 tablet (0 5 mg total) by mouth 2 (two) times a day 60 tablet 0    cyclobenzaprine (FLEXERIL) 10 mg tablet Take 10 mg by mouth Three times daily as needed      fentaNYL (DURAGESIC) 50 mcg/hr Place 1 patch on the skin every third day Max Daily Amount: 1 patch 10 patch 0    fentaNYL (DURAGESIC) 75 mcg/hr PLACE 1 PATCH ON THE SKIN EVERY THIRD DAY   MAX DAILY AMOUNT  1 PATCH      furosemide (LASIX) 20 mg tablet Take 1 tablet (20 mg total) by mouth daily as needed (leg swelling) 30 tablet 1    hydrOXYzine pamoate (VISTARIL) 25 mg capsule Take 1 capsule (25 mg total) by mouth 3 (three) times a day as needed for anxiety 270 capsule 0    oxyCODONE (ROXICODONE) 15 mg immediate release tablet take 1 tablet by mouth FIVE TIMES A DAY MAX 75 MG A DAY      phenazopyridine (PYRIDIUM) 100 mg tablet take 1 tablet by mouth three times a day if needed  FOR BLADDER SPASMS      pregabalin (LYRICA) 75 mg capsule Take 1 capsule (75 mg total) by mouth 3 (three) times a day 270 capsule 1    sertraline (ZOLOFT) 100 mg tablet Take 2 tablets (200 mg total) by mouth daily 180 tablet 0    sulfamethoxazole-trimethoprim (BACTRIM DS) 800-160 mg per tablet take 1 tablet by mouth twice a day for 3 days       No current facility-administered medications for this visit  Allergies   Allergen Reactions    Vioxx [Rofecoxib] Rash       The following portions of the patient's history were reviewed and updated as appropriate: allergies, current medications, past family history, past medical history, past social history, past surgical history and problem list     OBJECTIVE:     Mental Status Examination:    Appearance calm and cooperative , adequate hygiene and grooming and good eye contact    Mood anxious   Affect affect appropriate    Speech a normal rate   Thought Processes normal thought processes   Hallucinations no hallucinations present    Thought Content no delusions   Abnormal Thoughts no suicidal thoughts  and no homicidal thoughts    Orientation  oriented to person and place and time   Remote Memory short term memory intact and long term memory intact   Attention Span concentration intact   Intellect Appears to be of Average Intelligence   Insight Insight intact   Judgement judgment was intact   Muscle Strength Abnormal gait and Decreased muscle strength due to pain   Language no difficulty naming common objects   Fund of Knowledge displays adequate knowledge of current events   Pain moderate to severe   Pain Scale 7       Laboratory Results: No results found for this or any previous visit  Assessment/Plan:       Diagnoses and all orders for this visit:    Major depressive disorder, recurrent, moderate (HCC)  -     buPROPion (WELLBUTRIN XL) 300 mg 24 hr tablet; Take 1 tablet (300 mg total) by mouth daily  -     sertraline (ZOLOFT) 100 mg tablet;  Take 2 tablets (200 mg total) by mouth daily    SAMMY (generalized anxiety disorder)    Generalized anxiety disorder  -     Discontinue: hydrOXYzine pamoate (VISTARIL) 25 mg capsule; Take 1 capsule (25 mg total) by mouth 3 (three) times a day as needed for anxiety  -     Discontinue: hydrOXYzine pamoate (VISTARIL) 25 mg capsule; Take 1 capsule (25 mg total) by mouth 3 (three) times a day as needed for anxiety  -     hydrOXYzine pamoate (VISTARIL) 25 mg capsule; Take 1 capsule (25 mg total) by mouth 3 (three) times a day as needed for anxiety          Treatment Recommendations- Risks Benefits      Immediate Medical/Psychiatric/Psychotherapy Treatments and Any Precautions: reviewed medication, support provided continue treatment plan    Risks, Benefits And Possible Side Effects Of Medications:  {PSYCH RISK, BENEFITS AND POSSIBLE SIDE EFFECTS (Optional):00149    Controlled Medication Discussion: Discussed with patient the risks of sedation, respiratory depression, impairment of ability to drive and potential for abuse and addiction related to treatment with benzodiazepine medications  The patient understands risk of treatment with benzodiazepine medications, agrees to not drive if feels impaired and agrees to take medications as prescribed   and she is aware of safe use and storage of medication     Psychotherapy Provided: 16 min  Supportive therapy  Coping with son's behavior  Coping with pain    Goals discussed in session: improve mood, decrease anxiety       Treatment Plan:    Completed and signed during the session: Not applicable - Treatment Plan not due at this session enacted 7/15/2020, updated 10/14/2020, 5/24/2021        Shruthi Boles, PhD 06/22/21

## 2021-07-20 NOTE — PSYCH
Subjective:     Patient ID: Angelia Lemons is a 64 y o  female history of chronic pain, anxiety, depression seen for medication management and mood evaluation  Edna Lopez and her  attended the session reporting depression is on and off, denies SI, situational anxiety related to wanting to move to [place] was expressed  Appetite and sleep were reported as good  She reports weight loss, fell on her buttocks and back pain is present  Recent back surgery with small pocket of fluid experienced near surgical site  She is due for a reassessment of back  Verbalized frustration due to not finding a place to live in [place]  Takes medication as prescribed  Family are supportive  HPI ROS Appetite Changes and Sleep: normal appetite and decreased energy    Review Of Systems:     Mood Anxiety and Depression   Behavior Normal    Thought Content Normal   General Emotional Problems   Personality Normal   Other Psych Symptoms Normal   Constitutional As Noted in HPI   ENT As Noted in HPI   Cardiovascular As Noted in HPI   Respiratory As Noted in HPI   Gastrointestinal As Noted in HPI   Genitourinary As Noted in HPI   Musculoskeletal As Noted in HPI   Integumentary As Noted in HPI   Neurological As Noted in HPI   Endocrine Normal    Other Symptoms Normal              Laboratory Results: No results found for this or any previous visit      Substance Abuse History:  Social History     Substance and Sexual Activity   Drug Use No       Family Psychiatric History:   Family History   Problem Relation Age of Onset    Hypertension Mother     Hypertension Father     Cardiomyopathy Maternal Grandmother        The following portions of the patient's history were reviewed and updated as appropriate: allergies, current medications, past family history, past medical history, past social history, past surgical history and problem list     Social History     Socioeconomic History    Marital status: /Civil Union     Spouse name: Tiffany Malagon  Number of children: 3    Years of education: Not on file    Highest education level: Not on file   Occupational History    Not on file   Tobacco Use    Smoking status: Former Smoker     Packs/day: 1 00     Years: 40 00     Pack years: 40 00     Types: Cigarettes     Quit date: 2019     Years since quittin 9    Smokeless tobacco: Never Used   Vaping Use    Vaping Use: Never used   Substance and Sexual Activity    Alcohol use: Not Currently    Drug use: No    Sexual activity: Not Currently     Partners: Male   Other Topics Concern    Not on file   Social History Narrative    Not on file     Social Determinants of Health     Financial Resource Strain:     Difficulty of Paying Living Expenses:    Food Insecurity:     Worried About Running Out of Food in the Last Year:     [address] in the Last Year:    Transportation Needs:     Lack of Transportation (Medical):      Lack of Transportation (Non-Medical):    Physical Activity:     Days of Exercise per Week:     Minutes of Exercise per Session:    Stress:     Feeling of Stress :    Social Connections:     Frequency of Communication with Friends and Family:     Frequency of Social Gatherings with Friends and Family:     Attends Spiritism Services:     Active Member of Clubs or Organizations:     Attends Club or Organization Meetings:     Marital Status:    Intimate Partner Violence:     Fear of Current or Ex-Partner:     Emotionally Abused:     Physically Abused:     Sexually Abused:      Social History     Social History Narrative    Not on file       Objective:       Mental status:  Appearance calm and cooperative , adequate hygiene and grooming and good eye contact    Mood depressed and anxious   Affect affect appropriate    Speech a normal rate   Thought Processes normal thought processes   Hallucinations no hallucinations present    Thought Content no delusions   Abnormal Thoughts no suicidal thoughts  and no homicidal thoughts    Orientation  oriented to person and place and time   Remote Memory short term memory intact and long term memory intact   Attention Span concentration intact   Intellect Appears to be of Average Intelligence   Insight Insight intact   Judgement judgment was intact   Muscle Strength Abnormal gait and Decreased muscle strength   Language no difficulty naming common objects   Fund of Knowledge displays adequate knowledge of current events   Pain moderate to severe   Pain Scale 7-8       Assessment/Plan:       Diagnoses and all orders for this visit:    SAMMY (generalized anxiety disorder)    Major depressive disorder, recurrent, moderate (HCC)    Anxiety  -     clonazePAM (KlonoPIN) 0 5 mg tablet; Take 1 tablet (0 5 mg total) by mouth 2 (two) times a day    Uncomplicated opioid dependence ([facility] [address] )            Treatment Recommendations- Risks Benefits      Immediate Medical/Psychiatric/Psychotherapy Treatments and Any Precautions: reviewed medications, continue with treatment plan    Risks, Benefits And Possible Side Effects Of Medications:  {PSYCH RISK, BENEFITS AND POSSIBLE SIDE EFFECTS (Optional):27743    Controlled Medication Discussion: Discussed with patient the risks of sedation, respiratory depression, impairment of ability to drive and potential for abuse and addiction related to treatment with benzodiazepine medications  The patient understands risk of treatment with benzodiazepine medications, agrees to not drive if feels impaired and agrees to take medications as prescribed  and she is aware of safe use and storage of medication  Not driving at this time  Psychotherapy Provided: 20 min Individual psychotherapy provided     Supportive therapy  Coping with frustration over move  Encouraged verbalization of feelings, which were normalized and validated    Goals discussed in session: improve coping with anxiety and frustration    Treatment plan not due this session, enacted 10/14/2020, updated 5/24/2021

## 2021-11-09 PROBLEM — S06.0X1A CONCUSSION WITH BRIEF LOC: Status: ACTIVE | Noted: 2021-01-01

## 2021-11-09 PROBLEM — S06.5X9A SDH (SUBDURAL HEMATOMA) (HCC): Status: ACTIVE | Noted: 2021-01-01

## 2021-11-09 PROBLEM — H53.8 BLURRY VISION: Status: ACTIVE | Noted: 2021-01-01

## 2021-11-09 PROBLEM — Z98.890 BACK PAIN WITH HISTORY OF SPINAL SURGERY: Status: ACTIVE | Noted: 2021-01-01

## 2021-11-09 PROBLEM — S42.001A CLOSED DISPLACED FRACTURE OF RIGHT CLAVICLE: Status: ACTIVE | Noted: 2021-01-01

## 2021-11-09 PROBLEM — R42 LIGHTHEADED: Status: ACTIVE | Noted: 2021-01-01

## 2021-11-09 PROBLEM — M67.461 GANGLION OF RIGHT KNEE: Status: ACTIVE | Noted: 2021-01-01

## 2021-11-09 PROBLEM — M54.9 BACK PAIN WITH HISTORY OF SPINAL SURGERY: Status: ACTIVE | Noted: 2021-01-01

## 2021-11-09 PROBLEM — C34.92: Status: ACTIVE | Noted: 2021-01-01

## 2021-11-09 PROBLEM — S06.0X9A CONCUSSION WITH BRIEF LOC: Status: ACTIVE | Noted: 2021-01-01

## 2021-11-09 PROBLEM — Z13.820 SCREENING FOR OSTEOPOROSIS: Status: ACTIVE | Noted: 2021-01-01

## 2021-11-09 PROBLEM — Z98.1 STATUS POST LUMBAR SPINAL FUSION: Status: ACTIVE | Noted: 2021-01-01

## 2021-11-09 PROBLEM — Z01.818 PRE-OPERATIVE CLEARANCE: Status: ACTIVE | Noted: 2021-01-01

## 2021-11-09 PROBLEM — J44.9 COPD (CHRONIC OBSTRUCTIVE PULMONARY DISEASE) (HCC): Status: ACTIVE | Noted: 2021-01-01

## 2021-11-09 PROBLEM — S06.5XAA SDH (SUBDURAL HEMATOMA) (HCC): Status: ACTIVE | Noted: 2021-01-01

## 2021-11-09 PROBLEM — S83.289A LATERAL MENISCUS TEAR: Status: ACTIVE | Noted: 2021-01-01

## 2021-11-09 PROBLEM — D64.9 CHRONIC ANEMIA: Status: ACTIVE | Noted: 2021-01-01

## 2021-11-09 PROBLEM — C34.90 LUNG CANCER (HCC): Status: ACTIVE | Noted: 2021-01-01

## 2021-11-09 PROBLEM — M19.90 ARTHRITIS: Status: ACTIVE | Noted: 2021-01-01

## 2021-11-09 PROBLEM — C34.12 PRIMARY MALIGNANT NEOPLASM OF LEFT UPPER LOBE OF LUNG (HCC): Status: ACTIVE | Noted: 2021-01-01

## 2021-12-26 PROBLEM — A41.9 SEPTIC SHOCK (HCC): Status: ACTIVE | Noted: 2021-01-01

## 2021-12-26 PROBLEM — K66.8 PNEUMOPERITONEUM: Status: ACTIVE | Noted: 2021-01-01

## 2021-12-26 PROBLEM — R65.21 SEPTIC SHOCK (HCC): Status: ACTIVE | Noted: 2021-01-01

## 2021-12-26 PROBLEM — K63.1 BOWEL PERFORATION (HCC): Status: ACTIVE | Noted: 2021-01-01

## 2021-12-26 PROBLEM — E87.6 HYPOKALEMIA: Status: ACTIVE | Noted: 2021-01-01

## 2021-12-26 PROBLEM — N17.9 AKI (ACUTE KIDNEY INJURY) (HCC): Status: ACTIVE | Noted: 2021-01-01

## 2021-12-26 PROBLEM — S22.080A COMPRESSION FRACTURE OF T12 VERTEBRA (HCC): Status: ACTIVE | Noted: 2021-01-01

## 2021-12-27 PROBLEM — J96.00 ACUTE RESPIRATORY FAILURE (HCC): Status: ACTIVE | Noted: 2021-01-01

## 2021-12-27 NOTE — ASSESSMENT & PLAN NOTE
· Secondary to intra-abdominal contamination in the setting of her bowel perforation  · Was treated with IV abx   · Unfortunately patient had decreased EF in the setting of septic shock   · Patient was maintained of levophed, milnarone and vasopressin   · Eventually patient become hypotensive and coded  · ROSC was achieved after 3 mintues, decision by family was made at this time for patient to become DNR  · Patient's TOD was called at 2:28 pm

## 2021-12-27 NOTE — CONSULTS
Cardiology Consultation  MD Shanita Brown MD Monika Rist, DO, MD Gil Gudino DO, Odilon Salamanca DO, [facility] - [place]  ----------------------------------------------------------------  [address] [place] [address]    Lee Alcantar 64 y o  female MRN: [id number]  Unit/Bed#: ICU 14 Encounter: [id number]      Reason for Consultation: Cardiomyopathy      ASSESSMENT:   · Bowel perforation s/p laparoscopic repair, December 26, 2021  · Shock, likely septic, less likely cardiogenic  · Cardiomyopathy with takotsubo appearance on bedside echo  · Acute hypoxic respiratory failure with ventilator dependence  · Non MI troponin elevation secondary to sepsis in the face of acute kidney injury with takotsubo cardiomyopathy  · Acute kidney injury  · Lactic acidosis  · Probable acute blood loss anemia  · Transaminitis with shock liver  · Supratherapeutic INR secondary to shock liver  · Hypocalcemia  · 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 at [facility]  · COPD     PLAN:  Bedside echocardiogram demonstrates severely reduced systolic function with preserved RV function  Lower extremities are mildly cool, but not cold and she is not significantly edematous  She also was found to have purulent findings inside of her abdomen during the laparoscopic procedure   I believe her physiology is predominantly septic, but there is likely some component of the LV dysfunction playing a role  Due to her drop in hemoglobin, she would not be a candidate for advanced therapies such as Impella or intra-aortic balloon pump as they would both require full anticoagulation during her dropping hemoglobin  Currently on pressor support and would not be able to start on ACE-inhibitor/ARB or began beta-blocker  Will check formal 2D echocardiogram to assess cardiac structure and function  Continue with Levophed as per Critical Care  Would perform right heart catheterization to further evaluate her SVR and output to see where she currently is on her medications including Milrinone  If physiology favor sepsis, would likely be better to start titrating off Milrinone in add additional pressors  Will discuss with interventional placing right heart catheterization given supratherapeutic INR  Patient receiving FFP shortly  Antibiotics as per primary team  Guarded prognosis    Signed: Kamran Euceda DO, [facility] [place], [facility]      History of Present Illness:  Nirmal Adame is a 64 y o  female with COPD, primary malignant neoplasm of right upper lobe status post radiation therapy at [facility], fibromyalgia and history of tobacco use presents with acute onset abdominal pain at [facility]  The patient was found be hypotensive and tachycardic  CT scan demonstrated free air in the abdomen with rising lactic acid  She was subsequently transferred to [facility] for emergent surgical intervention and  She underwent laparoscopic procedure with extensive lysis of adhesions, perforated ulcer repair and abdominal washout  She was found have purulence drainage inside the abdomen  Following the procedure overnight, she became more hypotensive with rising lactic acid  Bedside echocardiogram was performed showing significant left ventricular dysfunction  We have been asked to see her regarding this LV dysfunction  Patient remains intubated and sedated on ventilator  Pressors have increased to maximum dose Levophed  Case was discussed with cardiology overnight and she was started on Milrinone  Unable to answer questions due to being intubated and sedated on ventilator        Review of Systems:  Unable to obtain review of systems as patient is intubated and sedated on ventilator      Past Medical History:   Diagnosis Date    Anxiety     Arthritis     Bowel perforation ([facility] [address] ) 12/26/2021    Depression     Panic attack     Scoliosis        Past Surgical History:   Procedure Laterality Date    APPENDECTOMY      CHOLECYSTECTOMY      DILATION AND CURETTAGE OF UTERUS      GASTRIC BYPASS      HYSTERECTOMY      JOINT REPLACEMENT Right     knee    KNEE ARTHROSCOPY      LAST ASSESSED [id number]    NERVE BLOCK Right 1/6/2017    Procedure: LUMBAR MEDIAL BRANCH BLOCK L3, L4, L5;  Surgeon: Lashawn Vigil MD;  Location: [place] [address] MAIN OR;  Service:     NERVE BLOCK Right 8/12/2016    Procedure: BLOCK MEDIAL BRANCH T9, T10, T11, T12;  Surgeon: Lashawn Vigil MD;  Location: [place] [address] MAIN OR;  Service:     OOPHORECTOMY Bilateral     UT INJ DX/THER AGNT PARAVERT FACET [employer] [employer], 1ST LEVEL Left 4/28/2017    Procedure: THORACIC MEDIAL BRANCH BLOCK T8-9, T10-11;  Surgeon: Lashawn Vigil MD;  Location: [place] [address] MAIN OR;  Service: Pain Management     UT INJECTION,SACROILIAC JOINT Right 6/16/2017    Procedure: SI JOINT INJECTION;  Surgeon: Lashawn Vigil MD;  Location: [place] [address] MAIN OR;  Service: Pain Management     UT LAP,DIAGNOSTIC ABDOMEN N/A 12/26/2021    Procedure: LAPAROSCOPY DIAGNOSTIC; EXTENSIVE LYSIS OF ADHESIONS; MARGINAL ULCER PERFORATION REPAIR; EXTENSIVE ABDOMINAL WASHOUT;  Surgeon: Elizabeth Hernandez MD;  [place];  Service: Darlene Nestle Right 2/17/2017    Procedure: ABLATION RADIO FREQUENCY (RFA)  L3-4, L4-5, L5-S1;  Surgeon: Lashawn Vigil MD;  Location: [place] [address] MAIN OR;  Service:     RADIOFREQUENCY ABLATION Right 9/23/2016    Procedure: ABLATION RADIO FREQUENCY (RFA)  T11, T12, L1, L2;  Surgeon: Lashawn Vigil MD;  Location: [place] [address] MAIN OR;  Service:     REPLACEMENT TOTAL KNEE      LAST ASSESSED [id number]       Social History     Socioeconomic History    Marital status: /Civil Union     Spouse name: Alyssa Snyder Number of children: 3    Years of education: Not on file    Highest education level: Not on file   Occupational History    Not on file   Tobacco Use    Smoking status: Current Some Day Smoker     Packs/day: 1 00     Years: 40 00     Pack years: 40 00     Types: Cigarettes     Last attempt to quit: 2019     Years since quittin 3    Smokeless tobacco: Never Used   Vaping Use    Vaping Use: Never used   Substance and Sexual Activity    Alcohol use: Not Currently    Drug use: No    Sexual activity: Not Currently     Partners: Male   Other Topics Concern    Not on file   Social History Narrative    Not on file     Social Determinants of Health     Financial Resource Strain: Not on file   Food Insecurity: Not on file   Transportation Needs: Not on file   Physical Activity: Not on file   Stress: Not on file   Social Connections: Not on file   Intimate Partner Violence: Not on file   Housing Stability: Not on file       Family History   Problem Relation Age of Onset    Hypertension Mother     Hypertension Father     Cardiomyopathy Maternal Grandmother        Allergies   Allergen Reactions    Coconut Oil-Flaxseed Oil - Food Allergy Other (See Comments)    Glycyrrhiza Other (See Comments)    Vioxx [Rofecoxib] Rash       Current Facility-Administered Medications on File Prior to Encounter   Medication    [COMPLETED] cefepime (MAXIPIME) IVPB (premix in dextrose) 2,000 mg 50 mL    [COMPLETED] multi-electrolyte (ISOLYTE-S PH 7 4) bolus 1,000 mL    [COMPLETED] multi-electrolyte (ISOLYTE-S PH 7 4) bolus 1,000 mL    [COMPLETED] multi-electrolyte (ISOLYTE-S PH 7 4) bolus 1,000 mL    [COMPLETED] potassium chloride 20 mEq IVPB (premix)    [DISCONTINUED] fentanyl citrate (PF) 100 MCG/2ML 50 mcg    [DISCONTINUED] norepinephrine (LEVOPHED) 4 mg (STANDARD CONCENTRATION) IV in sodium chloride 0 9% 250 mL    [DISCONTINUED] potassium chloride (K-DUR,KLOR-CON) CR tablet 40 mEq    [COMPLETED] potassium chloride 20 mEq IVPB (premix)     Current Outpatient Medications on File Prior to Encounter   Medication Sig    albuterol (PROVENTIL HFA,VENTOLIN HFA) 90 mcg/act inhaler Inhale 2 puffs every 6 (six) hours as needed    Anoro Ellipta 62 5-25 MCG/INH inhaler     benzonatate (TESSALON) 200 MG capsule Take 200 mg by mouth Three times daily as needed    buPROPion (WELLBUTRIN XL) 300 mg 24 hr tablet Take 300 mg by mouth    clonazePAM (KlonoPIN) 0 5 mg tablet Take 1 tablet (0 5 mg total) by mouth 2 (two) times a day    cyclobenzaprine (FLEXERIL) 10 mg tablet Take 10 mg by mouth Three times daily as needed    eszopiclone (LUNESTA) 2 mg tablet Take 1 tablet (2 mg total) by mouth daily at bedtime as needed for sleep Take immediately before bedtime    fentaNYL (DURAGESIC) 100 mcg/hr TD 72 hr patch     fentaNYL (DURAGESIC) 50 mcg/hr Place 1 patch on the skin every third day Max Daily Amount: 1 patch    fentaNYL (DURAGESIC) 75 mcg/hr PLACE 1 PATCH ON THE SKIN EVERY THIRD DAY   MAX DAILY AMOUNT  1 PATCH    furosemide (LASIX) 20 mg tablet Take 1 tablet (20 mg total) by mouth daily as needed (leg swelling)    hydrOXYzine pamoate (VISTARIL) 25 mg capsule Take 1 capsule (25 mg total) by mouth 3 (three) times a day as needed for anxiety    mupirocin (BACTROBAN) 2 % ointment PLEASE SEE ATTACHED FOR DETAILED DIRECTIONS    naloxone (Narcan) 4 mg/0 1 mL nasal spray     Narcan 4 MG/0 1ML nasal spray     oxyCODONE (ROXICODONE) 15 mg immediate release tablet take 1 tablet by mouth FIVE TIMES A DAY MAX 75 MG A DAY    oxyCODONE (ROXICODONE) 20 MG TABS     phenazopyridine (PYRIDIUM) 100 mg tablet take 1 tablet by mouth three times a day if needed  FOR BLADDER SPASMS    pregabalin (LYRICA) 75 mg capsule Take 1 capsule (75 mg total) by mouth 3 (three) times a day    Rexulti 3 MG tablet TAKE 1 TABLET BY MOUTH  DAILY    sertraline (ZOLOFT) 100 mg tablet Take 2 tablets (200 mg total) by mouth daily    sulfamethoxazole-trimethoprim (BACTRIM DS) 800-160 mg per tablet take 1 tablet by mouth twice a day for 3 days (Patient not taking: Reported on 11/28/2021)        Current Facility-Administered Medications   Medication Dose Route Frequency Provider Last Rate    chlorhexidine  15 mL Mouth/Throat Q12H [address], CRNP      [START ON 12/28/2021] fluconazole  200 mg Intravenous Q24H Sal Gonzalez MD      heparin (porcine)  5,000 Units Subcutaneous [facility] [place] Grade, CRNP      HYDROmorphone  1 mg Intravenous Q4H PRN [place] Grade, CRNP      meropenem  1,000 mg Intravenous Q12H Sal Gonzalez MD      milrinone [facility]) infusion  0 38 mcg/kg/min Intravenous Continuous [place] Grade, CRNP 0 38 mcg/kg/min (12/27/21 0245)    norepinephrine  1-30 mcg/min Intravenous Titrated [place] Grade, CRNP 25 mcg/min (12/27/21 0959)    pantoprazole  40 mg Intravenous Q12H [address] [place] Grade, CRNP      propofol  5-50 mcg/kg/min Intravenous Titrated [place] Grade, CRNP Stopped (12/27/21 0030)    sodium bicarbonate infusion  125 mL/hr Intravenous Continuous [place] Grade, CRNP 125 mL/hr (12/27/21 3424)    thiamine  100 mg Intravenous Daily [place] Grade, CRNP 100 mg (12/27/21 0921)    vasopressin  0 04 Units/min Intravenous Continuous [place], CRNP 0 04 Units/min (12/27/21 0844)       milrinone (PRIMACOR) infusion, 0 38 mcg/kg/min, Last Rate: 0 38 mcg/kg/min (12/27/21 0245)  norepinephrine, 1-30 mcg/min, Last Rate: 25 mcg/min (12/27/21 0959)  propofol, 5-50 mcg/kg/min, Last Rate: Stopped (12/27/21 0030)  sodium bicarbonate infusion, 125 mL/hr, Last Rate: 125 mL/hr (12/27/21 0923)  vasopressin, 0 04 Units/min, Last Rate: 0 04 Units/min (12/27/21 0844)        Vitals:    12/27/21 0907 12/27/21 0909 12/27/21 0915 12/27/21 0942   BP:    126/67   BP Location:       Pulse:  102 102 (!) 106   Resp:  (!) 24 (!) 24 (!) 24   Temp: (!) 97 2 °F (36 2 °C)  97 8 °F (36 6 °C)    TempSrc:   Temporal    SpO2:    97%   Weight:    59 9 kg (132 lb)   Height:    5' 2" (1 575 m)       I/O last 3 completed shifts:   In: 5787 7 [I V :4891; IV Piggyback:896 7]  Out: 710 [Urine:395; Drains:295; Blood:20]      Intake/Output Summary (Last 24 hours) at 2021 1019  Last data filed at 2021 0933  Gross per 24 hour   Intake 8301 78 ml   Output 1060 ml   Net 7241 78 ml       Weight change:     PHYSICAL EXAMINATION:  Gen: sedated, NAD, frail  Head/eyes: AT/NC, pupils equal and round, Anicteric  ENT: dry mm; +ETT  Neck: Supple, No elevated JVP, trachea midline  Resp: CTA bilaterally no w/r/r  CV: tachy reg +S1, S2, No m/r/g  Abd: Soft, decreased BS mildly distended  Ext: no LE edema bilaterally; bilateral LE healing ulcers w/ mildly cool extremities  Neuro: sedated on vent   Psych: sedated on vent  Skin: no rash, erythema or venous stasis changes on exposed skin    Lab Results:  Results from last 7 days   Lab Units 21  0414   WBC Thousand/uL 1 24*   HEMOGLOBIN g/dL 6 2*   HEMATOCRIT % 18 9*   PLATELETS Thousands/uL 72*     Results from last 7 days   Lab Units 21  0413 21  0017 21  2230   POTASSIUM mmol/L 4 6   < >  --    CHLORIDE mmol/L 99*   < >  --    CO2 mmol/L 16*   < >  --    CO2, I-STAT mmol/L  --   --  24   BUN mg/dL 18   < >  --    CREATININE mg/dL 1 80*   < >  --    CALCIUM mg/dL 5 6*   < >  --    ALK PHOS U/L 61   < >  --    ALT U/L 138*   < >  --    AST U/L 1,148*   < >  --    GLUCOSE, ISTAT mg/dl  --   --  67    < > = values in this interval not displayed       No results found for: TROPONINT      Results from last 7 days   Lab Units 21  1611   CK TOTAL U/L 290 5*   CK MB INDEX % 3 2*         Results from last 7 days   Lab Units 21  0409   INR  3 49*           Results for orders placed during the hospital encounter of 19    Echo complete with contrast if indicated    [address]  [address] [facility] [address]  [phone]    Transthoracic Echocardiogram  2D, M-mode, Doppler, and Color Doppler    Study date:  2019    Patient: Rebecca Newman  MR number: [id number]  Account number: [de-identified]  : 1960  Age: 61 years  Gender: Female  Status: Outpatient  Location: Echo lab  Height: 62 in  Weight: 195 6 lb  BP: 117/ 64 mmHg    Indications: Edema    Diagnoses: R60 9 - Edema, unspecified    Sonographer:  KY Perkins  Primary Physician:  Jordy Lee DO  Group:  [facility]  cc:  Ed Prescott MD  Interpreting Physician:  Flavio Sam MD    SUMMARY    LEFT VENTRICLE:  Systolic function was normal  Ejection fraction was estimated in the range of 55 % to 60 % to be 60 %  There were no regional wall motion abnormalities  Doppler parameters were consistent with abnormal left ventricular relaxation (grade 1 diastolic dysfunction)  TRICUSPID VALVE:  There was trace regurgitation  IVC, HEPATIC VEINS:  The inferior vena cava was mildly dilated  HISTORY: PRIOR HISTORY: Scoliosis, Depression, Arthritis    PROCEDURE: The procedure was performed in the echo lab  This was a routine study  The transthoracic approach was used  The study included complete 2D imaging, M-mode, complete spectral Doppler, and color Doppler  The heart rate was 80 bpm,  at the start of the study  Image quality was adequate  LEFT VENTRICLE: Size was normal  Systolic function was normal  Ejection fraction was estimated in the range of 55 % to 60 % to be 60 %  There were no regional wall motion abnormalities  Wall thickness was normal  No evidence of apical  thrombus  DOPPLER: Doppler parameters were consistent with abnormal left ventricular relaxation (grade 1 diastolic dysfunction)  RIGHT VENTRICLE: The size was normal  Systolic function was normal  Wall thickness was normal     LEFT ATRIUM: Size was normal     RIGHT ATRIUM: Size was normal     MITRAL VALVE: Valve structure was normal  There was normal leaflet separation  DOPPLER: The transmitral velocity was within the normal range  There was no evidence for stenosis  There was no significant regurgitation  AORTIC VALVE: The valve was trileaflet   Leaflets exhibited normal thickness and normal cuspal separation  DOPPLER: Transaortic velocity was within the normal range  There was no evidence for stenosis  There was no significant  regurgitation  TRICUSPID VALVE: The valve structure was normal  There was normal leaflet separation  DOPPLER: The transtricuspid velocity was within the normal range  There was no evidence for stenosis  There was trace regurgitation  The tricuspid jet  envelope definition was inadequate for estimation of RV systolic pressure  There are no indirect findings (abnormal RV volume or geometry, altered pulmonary flow velocity profile, or leftward septal displacement) which would suggest  moderate or severe pulmonary hypertension  PULMONIC VALVE: Leaflets exhibited normal thickness, no calcification, and normal cuspal separation  DOPPLER: The transpulmonic velocity was within the normal range  There was no significant regurgitation  PERICARDIUM: There was no pericardial effusion  The pericardium was normal in appearance  AORTA: The root exhibited normal size  SYSTEMIC VEINS: IVC: The inferior vena cava was mildly dilated  SYSTEM MEASUREMENT TABLES    2D mode  AoR Diam 2D: 3 3 cm  LA Diam (2D): 3 6 cm  LA/Ao (2D): 1 09  FS (2D Teich): 27 7 %  IVSd (2D): 0 97 cm  LVDEV: 79 9 cmï¾³  LVESV: 36 7 cmï¾³  LVIDd(2D): 4 23 cm  LVISd (2D): 3 06 cm  LVPWd (2D): 0 88 cm  SV (Teich): 43 2 cmï¾³    Apical four chamber  LVEF A4C: 58 %    Unspecified Scan Mode  MV Peak A Matthew: 1070 mm/s  MV Peak E Matthew  Mean: 933 mm/s  MVA (PHT): 4 23 cmï¾²  PHT: 52 ms  Max P mm[Hg]  V Max: 2540 mm/s  Vmax: 2350 mm/s  RA Area: 14 7 cmï¾²  RA Volume: 32 cmï¾³  TAPSE: 2 2 cm    [employer] Accredited Echocardiography Laboratory    Prepared and electronically signed by    Tatiana Lomeli MD  Signed 2019 11:12:25    No results found for this or any previous visit  No results found for this or any previous visit  No results found for this or any previous visit        CXR: Results for orders placed in visit on 04/20/21    XR chest pa & lateral    Narrative  6 [phone] [id number] [id number]    Results for orders placed during the hospital encounter of 12/26/21    XR chest portable    Narrative  CHEST    INDICATION:   Assess placement of endotracheal tube  Hypoxia  COMPARISON:  11/5/2019    EXAM PERFORMED/VIEWS:  XR CHEST PORTABLE      FINDINGS:    Endotracheal tube tip is approximately 2 5 cm above the lyn  Left upper quadrant drainage catheter is present with left basilar atelectasis and small effusion  There is also left upper lung field patchy airspace disease  Patient is status post lumbar spinal fusion    Impression  Endotracheal tube tip approximately 2 5 cm above the lyn  Patchy foci of airspace disease in the left upper lung field and the left base with small left effusion            Workstation performed: [id number]      ECG: ST LBBB    This note was completed in part utilizing [employer] Direct Software  Grammatical errors, random word insertions, spelling mistakes, and incomplete sentences may be an occasional consequence of this system secondary to software limitations, ambient noise, and hardware issues  If you have any questions or concerns about the content, text, or information contained within the body of this dictation, please contact the provider for clarification

## 2021-12-27 NOTE — PROGRESS NOTES
Chart reviewed  ECHO reviewed  Patient s/p bowel perforation with septic shock and now cardiomyopathy mostly due to severe sepsis  Appears to be going into DIC  ABG  Ph 7  2    Hgb 6 2    Lactic acid 12 3    She is on Milrinone and Epi  INR is up to 3 49  I do feel SG catheter will not provide any more useful information to guide her care at this time  Will d/w Dr Анна Gaston

## 2021-12-27 NOTE — PROCEDURES
POC Cardiac US    Date/Time: 12/27/2021 2:36 AM  Performed by: Emanuel Tolliver MD  Authorized by: Emanuel Tolliver MD     Patient location:  ICU  Procedure details:     Exam Type:  Diagnostic    Indications: hypotension and suspected volume depletion      Assessment / Evaluation for: cardiac function and intravascular volume status      Exam Type: initial exam      Image quality: diagnostic      Image availability:  Images available in PACS  Patient Details:     Cardiac Rhythm:  Regular    Medications: norepinephrine infusion      Mechanical ventilation: Yes    Cardiac findings:     Echo technique: limited 2D      Wall motion: hypodynamic      LV systolic function: depressed      RV dilation: none    IVC findings:     IVC Size: dilated      IVC Inspiratory Collapse: absent

## 2021-12-27 NOTE — PROGRESS NOTES
Pastoral Care Progress Note    2021  Patient: Real Bautista : 1960  Admission Date & Time: 2021  MRN: [id number] [facility]: [id number]      Family at bedside, provided emotional support,  Pastoral presence and prayer, will continue to follow and support family

## 2021-12-27 NOTE — H&P
Consultation - Bariatric Surgery   Antoni Bal 64 y o  female MRN: [id number]  Unit/Bed#: ED 02 Encounter: [id number]    Assessment/Plan     Assessment:  65 yo female with hx of laparoscopic RNYGB in 2005 with revision several years ago at OSH now presenting with acute abdominal pain found to have free air on CT scan    Plan:  - OR for diagnostic laparoscopy  - Admit to bariatric surgery service  - IV Abx  - NPO/IVF      History of Present Illness     HPI:  Antoni Bal is a 64 y o  female There is no height or weight on file to calculate BMI  with history of laparoscopic RNYGB in 2005 with revision about 4 years ago who presented to [facility] ED with complaints of acute onset abdominal pain  Found to be hypotensive and tachycardic on arrival to the ER  She was started on levophed  CT scan done without IV or PO contrast showed free air  She presented with a lactic of 6 5 which came down to 5 3 with fluids  Bariatric surgery service was consulted and the decision was made to transfer her priority one to [facility] for operative intervention  In [facility] ER, patient was found to peritoneal on exam  Of note, she's is a current smoker  She smokes a 1/2 pack a day  She denies alcohol use  Review of Systems   Constitutional: Negative for chills and fever  HENT: Negative for ear pain and sore throat  Eyes: Negative for pain and visual disturbance  Respiratory: Negative for cough and shortness of breath  Cardiovascular: Negative for chest pain and palpitations  Gastrointestinal: Positive for abdominal distention and abdominal pain  Genitourinary: Negative for dysuria and hematuria  Musculoskeletal: Negative for arthralgias and back pain  Skin: Negative for color change and rash  Neurological: Negative for seizures and syncope  All other systems reviewed and are negative        Historical Information   Past Medical History:   Diagnosis Date    Anxiety     Arthritis     Bowel perforation ([address] ) 2021    Depression     Panic attack     Scoliosis      Past Surgical History:   Procedure Laterality Date    APPENDECTOMY      CHOLECYSTECTOMY      DILATION AND CURETTAGE OF UTERUS      GASTRIC BYPASS      HYSTERECTOMY      JOINT REPLACEMENT Right     knee    KNEE ARTHROSCOPY      LAST ASSESSED [id number]    NERVE BLOCK Right 2017    Procedure: LUMBAR MEDIAL BRANCH BLOCK L3, L4, L5;  Surgeon: Manoj Harrell MD;  Location: [facility] MAIN OR;  Service:     NERVE BLOCK Right 2016    Procedure: BLOCK MEDIAL BRANCH T9, T10, T11, T12;  Surgeon: Manoj Harrell MD;  Location: [facility] MAIN OR;  Service:     OOPHORECTOMY Bilateral     DE INJ DX/THER AGNT PARAVERT FACET Ami Cm, 1ST LEVEL Left 2017    Procedure: THORACIC MEDIAL BRANCH BLOCK T8-9, T10-11;  Surgeon: Manoj Harrell MD;  Location: [facility] MAIN OR;  Service: Pain Management     DE INJECTION,SACROILIAC JOINT Right 2017    Procedure: SI JOINT INJECTION;  Surgeon: Manoj Harrell MD;  Location: [facility] MAIN OR;  Service: Pain Management     RADIOFREQUENCY ABLATION Right 2017    Procedure: ABLATION RADIO FREQUENCY (RFA)  L3-4, L4-5, L5-S1;  Surgeon: Manoj Harrell MD;  Location: [facility] MAIN OR;  Service:     RADIOFREQUENCY ABLATION Right 2016    Procedure: ABLATION RADIO FREQUENCY (RFA)  T11, T12, L1, L2;  Surgeon: Manoj Harrell MD;  Location: [facility] MAIN OR;  Service:     REPLACEMENT TOTAL KNEE      LAST ASSESSED [id number]     Social History   Social History     Substance and Sexual Activity   Alcohol Use Not Currently     Social History     Substance and Sexual Activity   Drug Use No     Social History     Tobacco Use   Smoking Status Current Some Day Smoker    Packs/day: 1 00    Years: 40 00    Pack years: 40 00    Types: Cigarettes    Last attempt to quit: 2019    Years since quittin 3   Smokeless Tobacco Never Used     Family History: non-contributory    Meds/Allergies   all current active meds have been reviewed  Allergies   Allergen Reactions    Coconut Oil-Flaxseed Oil - Food Allergy Other (See Comments)    Glycyrrhiza Other (See Comments)    Vioxx [Rofecoxib] Rash       Objective   First Vitals:   Blood Pressure: 112/58 (12/26/21 2113)  Pulse: 100 (12/26/21 2113)  Temperature: (!) 97 4 °F (36 3 °C) (12/26/21 2115)  Temp Source: Oral (12/26/21 2115)  Respirations: (!) 24 (12/26/21 2113)  SpO2: 99 % (12/26/21 2113)    Current Vitals:   Blood Pressure: 112/58 (12/26/21 2113)  Pulse: 100 (12/26/21 2113)  Temperature: (!) 97 4 °F (36 3 °C) (12/26/21 2115)  Temp Source: Oral (12/26/21 2115)  Respirations: (!) 24 (12/26/21 2113)  SpO2: 99 % (12/26/21 2113)    No intake or output data in the 24 hours ending 12/26/21 2123    Invasive Devices  Report    Central Venous Catheter Line            CVC Central Lines 12/26/21 Triple Left Femoral <1 day          Peripheral Intravenous Line            Peripheral IV 12/26/21 Dorsal (posterior); Right  <1 day    Peripheral IV 12/26/21 Right Antecubital <1 day                Physical Exam  Constitutional:       General: She is in acute distress  HENT:      Head: Normocephalic and atraumatic  Cardiovascular:      Rate and Rhythm: Regular rhythm  Tachycardia present  Pulmonary:      Effort: Pulmonary effort is normal    Abdominal:      General: There is distension  Tenderness: There is abdominal tenderness  There is guarding and rebound  Musculoskeletal:         General: Normal range of motion  Skin:     General: Skin is warm and dry  Neurological:      Mental Status: She is alert and oriented to person, place, and time  Psychiatric:         Mood and Affect: Mood normal          Lab Results: I have personally reviewed pertinent lab results  Imaging: I have personally reviewed pertinent reports  EKG, Pathology, and Other Studies: I have personally reviewed pertinent reports        CT chest/abd/pelvis  FINDINGS:  Exam is limited without IV and oral contrast particularly for soft tissue and bowel evaluation          CHEST     LUNGS:  Spiculated residual nodular density left upper lobe measures 2 0 x 1 9 cm image 32 series 204  Prior measurements of 4 3 x 3 2 cm on PET/CT  Volume loss in the left upper lobe  Small internal metallic object may be for radiotherapy      Scattered emphysematous changes      Compressive atelectasis in the left lower lobe adjacent to the pleural effusion      PLEURA:  New small left pleural effusion and trace right pleural effusion      HEART/GREAT VESSELS: Heart is unremarkable for patient's age  No thoracic aortic aneurysm      MEDIASTINUM AND CHELE:  Mild esophageal distention with fluid  Small hiatal hernia  No significant mediastinal lymphadenopathy  Limited evaluation for hilar lymphadenopathy without IV contrast      CHEST WALL AND LOWER NECK:   Unremarkable      ABDOMEN     LIVER/BILIARY TREE:  Liver is diffusely decreased in density consistent with fatty change  No CT evidence of suspicious hepatic mass  Normal hepatic contours  No biliary dilatation      GALLBLADDER:  Gallbladder is surgically absent      SPLEEN:  Unremarkable      PANCREAS:  Unremarkable      ADRENAL GLANDS:  Unremarkable      KIDNEYS/URETERS:  Unremarkable  No hydronephrosis      STOMACH AND BOWEL: Status post gastric bypass  Variable mild small bowel distention  Edis limb appears distended  No abrupt transition point       APPENDIX:  No findings to suggest appendicitis      ABDOMINOPELVIC CAVITY:  Scattered pneumoperitoneum  Majority of the free air is in the upper abdomen   There is an air tract in the left upper quadrant just to the left of the post surgical stomach      Mild to moderate ascites      VESSELS:  Unremarkable for patient's age      PELVIS     REPRODUCTIVE ORGANS:  Surgical changes of prior hysterectomy      URINARY BLADDER:  Unremarkable      ABDOMINAL WALL/INGUINAL REGIONS:  Small fat-containing bilateral inguinal hernias  Peripheral subcutaneous edema      OSSEOUS STRUCTURES:  Mild to moderate compression deformity at T12 related to the superior endplate  This is new  New mild superior endplate deformity at L2       Multiple chronic appearing bilateral lower rib fractures anterolaterally  Old fracture deformity of the right medial clavicle  Stable sclerotic density at the right ischial tuberosity      Interval posterior lumbar spine fusion from L2 to L5  Dextroscoliosis of the thoracolumbar spine      IMPRESSION:  Exam is limited without IV and oral contrast particularly for soft tissue and bowel evaluation       1   Scattered pneumoperitoneum largely in the upper abdomen near the stomach  Given the history of prior gastric bypass surgery, gastric or proximal small bowel perforation should be considered  Mild to moderate ascites  2   Smaller residual left upper lobe nodular density compatible with response to therapy  3   New small left pleural effusion and trace right pleural effusion  4  New mild to moderate compression deformity at T12 related to the superior endplate  New mild superior endplate deformity at L2  Counseling / Coordination of Care  Total floor / unit time spent today 20 minutes  Greater than 50% of total time was spent with the patient and / or family counseling and / or coordination of care

## 2021-12-27 NOTE — PROGRESS NOTES
12/27/21 1300   Clinical Encounter Type   Visited With Family   Routine Visit Introduction   Continue Visiting Yes   Worship Encounters   Worship Needs Prayer

## 2021-12-27 NOTE — UTILIZATION REVIEW
Initial Clinical Review    TRANSFER FROM [place] ED    Admission: Date/Time/Statement:   Admission Orders (From admission, onward)     Ordered        12/26/21 2127  Inpatient Admission  Once                      Orders Placed This Encounter   Procedures    Inpatient Admission     Standing Status:   Standing     Number of Occurrences:   1     Order Specific Question:   Level of Care     Answer:   Critical Care [15]     Order Specific Question:   Estimated length of stay     Answer:   More than 2 Midnights     Order Specific Question:   Certification     Answer:   I certify that inpatient services are medically necessary for this patient for a duration of greater than two midnights  See H&P and MD Progress Notes for additional information about the patient's course of treatment  ED Arrival Information     Expected Arrival Acuity    12/26/2021 12/26/2021 21:06 Emergent         Means of arrival Escorted by Service Admission type    Ambulance [facility] Emergency         Arrival complaint    Free air in abdomen        Chief Complaint   Patient presents with    Abdominal Pain     chance transfer  intractable abd pain  intial pressure in 60s at [place]  pt alert and oriented   Hypotension       Initial Presentation: 64  Y O female initially presented to ED at  [address] [place] [address],[address] after an acute onset of abdominal pain  Hypotensive and tachycardic  n ED arrival, started on levophed  CT scan showed free air  Labs revealed lactic  Of  6 5 and dropped to 5 3 after IVF  PMH is  RNYGB  2005 and revision about  4 years ago and current smoker  Transferred to \[facility]\"" for further care  Admit  Ip  With  Acute abdominal pain, free air on  Ct scan and plan is   CHELSEA and surgical intervention  Surgery    12/26   10:10 PM  Procedure  1  Diagnostic Laparoscopy  2  Extensive Lysis of Adhesions  3  Marginal ulcer perforation repair  4   Extensive abdominal washout      Operative Findings:     Extensive amount of purulent peritonitis (1,000 cc) secondary to a perforated anterior marginal ulcer       Date: 12/27          Day 2:   She remains critically ill requiring two pressors without significant improvement  I have been in close contact with the ICU doctor Dr Diane Garrido and we are trying to optimize her as best as we can  The CT today showed no further perforation but some residual air from the laparoscopy  She has dilated loops of small bowel but this is most likely due to an ileus given her severe infected peritonitis, septic shock and systemic inflammatory response  I have personally spoken with the family and they are aware of how critically ill she is and that at this point no further surgical intervention is warranted  This is her second marginal ulcer perforation secondary to smoking and her 5th surgery overall including her gastric bypass, an open gallbladder and appendectomy  The lower and mid portion of her abdomen was almost frozen due to adhesions and we were fortunate to find a window to do her ulcer repair laparoscopically  Continue full support for now  S/P  2  U PRBC  For hemoglobin drop to  6 8  Continue  CHELSEA,  IVF  Remains intubated         Wt Readings from Last 1 Encounters:   12/27/21 59 9 kg (132 lb)     Additional Vital Signs:   27/21 0708 97 2 °F (36 2 °C) Abnormal  96 24 Abnormal  -- -- 116/58 70 mmHg 94 % -- -- -- --   12/27/21 0700 -- 98 24 Abnormal  -- -- 116/58 72 mmHg 94 % -- -- -- --   12/27/21 0600 -- 96 24 Abnormal  -- -- 92/56 68 mmHg -- -- -- -- --   12/27/21 0500 -- 94 20 -- -- 94/62 74 mmHg -- -- -- -- --   12/27/21 0438 -- -- -- -- -- 88/50 64 mmHg -- -- -- -- --   12/27/21 0400 97 1 °F (36 2 °C) Abnormal  94 24 Abnormal  -- -- 90/54 66 mmHg -- -- -- -- --   12/27/21 0330 -- 94 24 Abnormal  -- -- 98/58 72 mmHg 99 % -- -- Ventilator --   12/27/21 0301 -- -- -- -- -- -- -- 92 % -- -- -- --   12/27/21 0300 96 8 °F (36 °C) Abnormal  90 24 Abnormal  -- -- 82/58 68 mmHg -- -- -- -- --   12/27/21 0200 -- 98 16 -- -- 88/62 72 mmHg -- -- -- -- --   12/27/21 0147 -- 94 16 -- -- 58/44 50 mmHg -- -- -- -- --   12/27/21 0143 -- 94 15 -- -- 62/50 54 mmHg -- -- -- -- --   12/27/21 0130 -- 96 15 -- -- 80/62 70 mmHg 87 % Abnormal  -- -- -- --   12/27/21 0106 -- 96 15 -- -- 86/68 76 mmHg 100 % -- -- Ventilator --   12/27/21 0100 -- 96 16 -- -- 92/70 80 mmHg -- -- -- -- --   12/27/21 0030 -- 92 15 -- -- 86/68 74 mmHg -- -- -- -- --   12/27/21 0000 96 2 °F (35 7 °C) Abnormal  88 16 -- -- 86/68 76 mmHg -- -- -- -- --   12/26/21 2130 -- 102 -- 111/55 79 -- -- 97 % -- -- -- --   12/26/21 2115 97 4 °F (36 3 °C) Abnormal  -- -- -- -- -- -- -- -- -- -- --   12/26/21 2113 -- 100 24 Abnormal  112/58 78 -- -- 99 % 36 4 L/min Nasal cannula Lying       Pertinent Labs/Diagnostic Test Results:   CT  Chest/abd/pelvis  ( 12/26)   Scattered pneumoperitoneum largely in the upper abdomen near the stomach  Given the history of prior gastric bypass surgery, gastric or proximal small bowel perforation should be considered   Mild to moderate ascites  2   Smaller residual left upper lobe nodular density compatible with response to therapy  3   New small left pleural effusion and trace right pleural effusion  4  New mild to moderate compression deformity at T12 related to the superior endplate   New mild superior endplate deformity at L2      Ct  C/spine  ( 12/26)     No cervical spine fracture or traumatic malalignment  Multilevel degenerative changes     Ct  Head  ( 12/26)    No acute abnormality  X ray  R  Shoulder  ( 12/27)    No abnormality  CXR  ( 12/27)       Endotracheal tube tip approximately 2 5 cm above the lyn   Patchy foci of airspace disease in the left upper lung field and the left base with small left effusion  Results from last 7 days   Lab Units 12/26/21  1545   SARS-COV-2  Negative     Results from last 7 days   Lab Units 12/27/21  0414 12/27/21  0321 12/27/21 0017 12/26/21 2230 12/26/21 2112 12/26/21 1611 12/26/21  1611   WBC Thousand/uL 1 24*  --  1 25*  --  1 89*   < > 2 85*   HEMOGLOBIN g/dL 6 2* 6 8* 8 9*  --  11 2*   < > 10 5*   I STAT HEMOGLOBIN g/dl  --   --   --  10 2*  --   --   --    HEMATOCRIT % 18 9*  --  27 4*  --  32 8*  --  30 3*   HEMATOCRIT, ISTAT %  --   --   --  30*  --   --   --    PLATELETS Thousands/uL 72*  --  119*  --  121*   < > 146*   NEUTROS ABS Thousands/µL  --   --   --   --   --   --  2 32   BANDS PCT %  --   --   --   --  14*  --   --     < > = values in this interval not displayed           Results from last 7 days   Lab Units 12/27/21 0413 12/27/21 0017 12/26/21 2230 12/26/21 2112 12/26/21  1611   SODIUM mmol/L 137 134*  --  133* 132*   POTASSIUM mmol/L 4 6 5 0  --  5 1 2 2*   CHLORIDE mmol/L 99* 101  --  97* 94*   CO2 mmol/L 16* 14*  --  24 24   CO2, I-STAT mmol/L  --   --  24  --   --    ANION GAP mmol/L 22* 19*  --  12 14*   BUN mg/dL 18 19  --  19 20   CREATININE mg/dL 1 80* 1 63*  --  1 70* 1 84*   EGFR ml/min/1 73sq m 29 33  --  32 29   CALCIUM mg/dL 5 6* 6 7*  --  6 2* 6 9*   CALCIUM, IONIZED mmol/L 0 82* 1 02*  --   --   --    MAGNESIUM mg/dL  --  2 2  --   --   --      Results from last 7 days   Lab Units 12/27/21 0413 12/27/21 0017 12/26/21 2112 12/26/21  1611   AST U/L 1,148* 438* 379* 85*   ALT U/L 138* 109* 75 27   ALK PHOS U/L 61 89 113 92 0   TOTAL PROTEIN g/dL 4 1* 3 1* 3 9* 4 0*   ALBUMIN g/dL 2 8* 0 9* 1 4* 2 0*   TOTAL BILIRUBIN mg/dL 1 11* 0 69 1 21* 1 17         Results from last 7 days   Lab Units 12/27/21  0413 12/27/21  0017 12/26/21  2112 12/26/21  1611   GLUCOSE RANDOM mg/dL 132 82 41* 59*      Results from last 7 days   Lab Units 12/27/21  0413 12/27/21  0141 12/27/21  0023   PH ART  7 202* 7 152* 7 022*   PCO2 ART mm Hg 33 9* 42 6 39 0   PO2 ART mm Hg 162 0* 205 9* 200 8*   HCO3 ART mmol/L 13 0* 14 6* 9 9*   BASE EXC ART mmol/L -13 8 -13 2 -19 9   O2 CONTENT ART mL/dL 9 0* 10 6* 13 2*   O2 HGB, ARTERIAL % 96 7 97 7* 98 2*   ABG SOURCE   --  Line, Arterial Line, Arterial     Results from last 7 days   Lab Units 12/27/21  0700 12/26/21  1711   PH KARRI  7 147* 7 347   PCO2 KARRI mm Hg 47 2 41 4*   PO2 KARRI mm Hg 42 0 107 3*   HCO3 KARRI mmol/L 16 0* 22 2*   BASE EXC KARRI mmol/L -12 1 -3 3   O2 CONTENT KARRI ml/dL 6 7 13 8   O2 HGB, VENOUS % 60 8 93 9*     Results from last 7 days   Lab Units 12/26/21  2230   I STAT BASE EXC mmol/L -2   ISTAT PH ART  7 343*   I STAT ART PCO2 mm HG 42 7   I STAT ART PO2 mm HG >400 0*   I STAT ART HCO3 mmol/L 23 2     Results from last 7 days   Lab Units 12/26/21  1611   CK TOTAL U/L 290 5*   CK MB INDEX % 3 2*   CK MB ng/mL 9 3*     Results from last 7 days   Lab Units 12/27/21  0919 12/27/21  0658 12/26/21  1810 12/26/21  1611   HS TNI 0HR ng/L  --  121*  --  37   HS TNI 2HR ng/L 234*  --  39  --    HSTNI D2 ng/L 113  --  2  --          Results from last 7 days   Lab Units 12/27/21  0409 12/27/21  0017 12/26/21  2112 12/26/21  1654 12/26/21  1654   PROTIME seconds 33 7* 17 6* 17 6*   < > 18 3*   INR  3 49* 1 49* 1 49*   < > 1 55*   PTT seconds  --   --   --   --  46*    < > = values in this interval not displayed  Results from last 7 days   Lab Units 12/27/21  0359 12/27/21  0017 12/26/21  1611   PROCALCITONIN ng/ml 32 20* 37 28* 37 47*     Results from last 7 days   Lab Units 12/27/21  0658 12/27/21  0413 12/27/21  0128 12/27/21  0017 12/26/21  2112 12/26/21  1810 12/26/21  1611   LACTIC ACID mmol/L 12 3* 13 8* 14 4* 11 3*  11 3* 4 9* 5 3* 6 5*           Results from last 7 days   Lab Units 12/26/21  1611   LIPASE u/L <6*           Results from last 7 days   Lab Units 12/26/21  1545   INFLUENZA A PCR  Negative   INFLUENZA B PCR  Negative   RSV PCR  Negative           Results from last 7 days   Lab Units 12/26/21  1655 12/26/21  1611   BLOOD CULTURE  Received in Microbiology Lab  Culture in Progress    --    GRAM STAIN RESULT   --  Gram negative rods*               ED Treatment:   Medication Administration from 12/26/2021 1953 to 12/26/2021 2149       Date/Time Order Dose Route Action Comments     12/26/2021 2127 norepinephrine (LEVOPHED) 4 mg (STANDARD CONCENTRATION) IV in sodium chloride 0 9% 250 mL   Intravenous Canceled Entry      12/26/2021 2135 ceFAZolin (ANCEF) IVPB (premix in dextrose) 2,000 mg 50 mL   Intravenous Canceled Entry      12/26/2021 2135 bupivacaine liposomal (EXPAREL) 1 3 % injection 20 mL 20 mL Infiltration Given by Other         Present on Admission:   Primary malignant neoplasm of left upper lobe of lung ([facility] [address] )   LASHAUN (acute kidney injury) ([facility] )   Septic shock ([facility] )   COPD (chronic obstructive pulmonary disease) ([place] [address] )      Admitting Diagnosis: Septic shock ([place] [address] ) [A41 9, R65 21]  Age/Sex: 64 y o  female  Admission Orders:  Scheduled Medications:  chlorhexidine, 15 mL, Mouth/Throat, Q12H [address]  fluconazole, 400 mg, Intravenous, Q24H  heparin (porcine), 5,000 Units, Subcutaneous, Q8H [address]  pantoprazole, 40 mg, Intravenous, Q12H CHAVEZ  piperacillin-tazobactam, 3 375 g, Intravenous, Q8H  thiamine, 100 mg, Intravenous, Daily      Continuous IV Infusions:  milrinone (PRIMACOR) infusion, 0 38 mcg/kg/min, Intravenous, Continuous  norepinephrine, 1-30 mcg/min, Intravenous, Titrated  propofol, 5-50 mcg/kg/min, Intravenous, Titrated  sodium bicarbonate infusion, 125 mL/hr, Intravenous, Continuous  vasopressin, 0 04 Units/min, Intravenous, Continuous      PRN Meds:  HYDROmorphone, 1 mg, Intravenous, Q4H PRN    Fall precautions  Dysphagia eval  Neuro checks q 1 hr    IP CONSULT TO CASE MANAGEMENT  IP CONSULT TO CARDIOLOGY    Network Utilization Review Department  ATTENTION: Please call with any questions or concerns to [phone] and carefully listen to the prompts so that you are directed to the right person   All voicemails are confidential   Wendy Reich all requests for admission clinical reviews, approved or denied determinations and any other requests to dedicated fax number below belonging to the campus where the patient is receiving treatment   List of dedicated fax numbers for the Facilities:  [address] [place] [address] DENIALS (Administrative/Medical Necessity) [phone]   [address] (Maternity/NICU/Pediatrics) [phone]   [address] Medical [facility] [address] [address] [place], [place] [address] [phone]

## 2021-12-27 NOTE — ASSESSMENT & PLAN NOTE
· Likely related to her acute intra-abdominal sepsis/ perforation  · We will monitor her renal indices and urine output closely  · We will dose adjust her medications  · We will avoid hypotension

## 2021-12-27 NOTE — ASSESSMENT & PLAN NOTE
· S/p Laproscopic repair  · Patient had at least 1L of murky purulent discharge drained at the time of perforation  · By the time of presentation to the Ed the patient was already in septic shock  · Continued to require pressors through out surgery, had perforation closed    · Pt was treated with IV abx

## 2021-12-27 NOTE — PROGRESS NOTES
Progress Note - Critical Care   Hillary Solders 64 y o  female MRN: [id number]  Unit/Bed#: ICU 15 Encounter: [id number]    Discussed with the family this afternoon about acute events/PEA arrest   Patient was hypotensive despite maximal vasopressors/inotropes support  Obtained ROSC in 3 minutes with ACLS measures  Family members involved including healthcare representative which is her  Zaki Palafox  The all expressed wishes to not pursue CPR again and transition her to DNR status  In the meantime will continue other treatment until 1 of her sons arrive and then we will discuss again about transition to comfort care   This meeting witnessed by Dr Silvestre Mera MD

## 2021-12-27 NOTE — PROGRESS NOTES
Cardiology Consultation  Leslye Massy, MD Charna Riedel, MD Cleatus Shook, DO, MD Santiago Han, , Ivette Finney DO, [facility]  ----------------------------------------------------------------  [address] [place] [id number]    Niki Mercado 64 y o  female MRN: [id number]  Unit/Bed#: ICU 14 Encounter: [id number]      Reason for Consultation: Cardiomyopathy      ASSESSMENT:    Bowel perforation s/p laparoscopic repair, December 26, 2021   Shock, likely septic, less likely cardiogenic   Cardiomyopathy with takotsubo appearance on bedside echo   Acute hypoxic respiratory failure with ventilator dependence   Non MI troponin elevation secondary to sepsis in the face of acute kidney injury with takotsubo cardiomyopathy   Acute kidney injury   Lactic acidosis   Probable acute blood loss anemia   Transaminitis with shock liver   Supratherapeutic INR secondary to shock liver   Hypocalcemia   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 at [facility] 673:  Bedside echocardiogram demonstrates severely reduced systolic function with preserved RV function  Lower extremities are mildly cool, but not cold and she is not significantly edematous  She also was found to have purulent findings inside of her abdomen during the laparoscopic procedure   I believe her physiology is predominantly septic, but there is likely some component of the LV dysfunction playing a role  Due to her drop in hemoglobin, she would not be a candidate for advanced therapies such as Impella or intra-aortic balloon pump as they would both require full anticoagulation during her dropping hemoglobin  Currently on pressor support and would not be able to start on ACE-inhibitor/ARB or began beta-blocker  Will check formal 2D echocardiogram to assess cardiac structure and function  Continue with Levophed as per Critical Care  Would perform right heart catheterization to further evaluate her SVR and output to see where she currently is on her medications including Milrinone  If physiology favor sepsis, would likely be better to start titrating off Milrinone in add additional pressors  Will discuss with interventional placing right heart catheterization given supratherapeutic INR  Patient receiving FFP shortly  Antibiotics as per primary team  Guarded prognosis    Signed: Rangel Mckeon DO, [facility], [facility]      History of Present Illness:  Misael Goncalves is a 64 y o  female with COPD, primary malignant neoplasm of right upper lobe status post radiation therapy at [facility], fibromyalgia and history of tobacco use presents with acute onset abdominal pain at [facility]  The patient was found be hypotensive and tachycardic  CT scan demonstrated free air in the abdomen with rising lactic acid  She was subsequently transferred to [facility] for emergent surgical intervention and  She underwent laparoscopic procedure with extensive lysis of adhesions, perforated ulcer repair and abdominal washout  She was found have purulence drainage inside the abdomen  Following the procedure overnight, she became more hypotensive with rising lactic acid  Bedside echocardiogram was performed showing significant left ventricular dysfunction  We have been asked to see her regarding this LV dysfunction  Patient remains intubated and sedated on ventilator  Pressors have increased to maximum dose Levophed  Case was discussed with cardiology overnight and she was started on Milrinone  Unable to answer questions due to being intubated and sedated on ventilator        Review of Systems:  Unable to obtain review of systems as patient is intubated and sedated on ventilator    Past Medical History:   Diagnosis Date    Anxiety     Arthritis     Bowel perforation ([facility] ) 12/26/2021    Depression     Panic attack     Scoliosis        Past Surgical History:   Procedure Laterality Date    APPENDECTOMY      CHOLECYSTECTOMY      DILATION AND CURETTAGE OF UTERUS      GASTRIC BYPASS      HYSTERECTOMY      JOINT REPLACEMENT Right     knee    KNEE ARTHROSCOPY      LAST ASSESSED [id number]    NERVE BLOCK Right 1/6/2017    Procedure: LUMBAR MEDIAL BRANCH BLOCK L3, L4, L5;  Surgeon: Carmelo Leblanc MD;  Location: [facility] MAIN OR;  Service:     NERVE BLOCK Right 8/12/2016    Procedure: BLOCK MEDIAL BRANCH T9, T10, T11, T12;  Surgeon: Carmelo Leblanc MD;  Location: [facility] MAIN OR;  Service:     OOPHORECTOMY Bilateral     ID INJ DX/THER AGNT PARAVERT FACET [employer], 1ST LEVEL Left 4/28/2017    Procedure: THORACIC MEDIAL BRANCH BLOCK T8-9, T10-11;  Surgeon: Carmelo Leblanc MD;  Location: [facility] MAIN OR;  Service: Pain Management     ID INJECTION,SACROILIAC JOINT Right 6/16/2017    Procedure: SI JOINT INJECTION;  Surgeon: Carmelo Leblanc MD;  Location: [facility] MAIN OR;  Service: Pain Management     ID LAP,DIAGNOSTIC ABDOMEN N/A 12/26/2021    Procedure: LAPAROSCOPY DIAGNOSTIC; EXTENSIVE LYSIS OF ADHESIONS; MARGINAL ULCER PERFORATION REPAIR; EXTENSIVE ABDOMINAL WASHOUT;  Surgeon: Beth Licona MD;  Location: [facility] Main OR;  Service: Eugune Christians Right 2/17/2017    Procedure: ABLATION RADIO FREQUENCY (RFA)  L3-4, L4-5, L5-S1;  Surgeon: Carmelo Leblanc MD;  Location: [facility] MAIN OR;  Service:     RADIOFREQUENCY ABLATION Right 9/23/2016    Procedure: ABLATION RADIO FREQUENCY (RFA)  T11, T12, L1, L2;  Surgeon: Carmelo Leblanc MD;  Location: [facility] MAIN OR;  Service:     REPLACEMENT TOTAL KNEE      LAST ASSESSED [id number]       Social History     Socioeconomic History    Marital status: /Civil Union     Spouse name: Carlos Adams Number of children: 3    Years of education: Not on file    Highest education level: Not on file   Occupational History    Not on file   Tobacco Use    Smoking status: Current Some Day Smoker     Packs/day: 1 00     Years: 40 00     Pack years: 40 00     Types: Cigarettes     Last attempt to quit: 2019     Years since quittin 3    Smokeless tobacco: Never Used   Vaping Use    Vaping Use: Never used   Substance and Sexual Activity    Alcohol use: Not Currently    Drug use: No    Sexual activity: Not Currently     Partners: Male   Other Topics Concern    Not on file   Social History Narrative    Not on file     Social Determinants of Health     Financial Resource Strain: Not on file   Food Insecurity: Not on file   Transportation Needs: Not on file   Physical Activity: Not on file   Stress: Not on file   Social Connections: Not on file   Intimate Partner Violence: Not on file   Housing Stability: Not on file       Family History   Problem Relation Age of Onset    Hypertension Mother     Hypertension Father     Cardiomyopathy Maternal Grandmother        Allergies   Allergen Reactions    Coconut Oil-Flaxseed Oil - Food Allergy Other (See Comments)    Glycyrrhiza Other (See Comments)    Vioxx [Rofecoxib] Rash       Current Facility-Administered Medications on File Prior to Encounter   Medication    [COMPLETED] cefepime (MAXIPIME) IVPB (premix in dextrose) 2,000 mg 50 mL    [COMPLETED] multi-electrolyte (ISOLYTE-S PH 7 4) bolus 1,000 mL    [COMPLETED] multi-electrolyte (ISOLYTE-S PH 7 4) bolus 1,000 mL    [COMPLETED] multi-electrolyte (ISOLYTE-S PH 7 4) bolus 1,000 mL    [COMPLETED] potassium chloride 20 mEq IVPB (premix)    [DISCONTINUED] fentanyl citrate (PF) 100 MCG/2ML 50 mcg    [DISCONTINUED] norepinephrine (LEVOPHED) 4 mg (STANDARD CONCENTRATION) IV in sodium chloride 0 9% 250 mL    [DISCONTINUED] potassium chloride (K-DUR,KLOR-CON) CR tablet 40 mEq    [COMPLETED] potassium chloride 20 mEq IVPB (premix)     Current Outpatient Medications on File Prior to Encounter   Medication Sig    albuterol (PROVENTIL HFA,VENTOLIN HFA) 90 mcg/act inhaler Inhale 2 puffs every 6 (six) hours as needed    Anoro Ellipta 62 5-25 MCG/INH inhaler     benzonatate (TESSALON) 200 MG capsule Take 200 mg by mouth Three times daily as needed    buPROPion (WELLBUTRIN XL) 300 mg 24 hr tablet Take 300 mg by mouth    clonazePAM (KlonoPIN) 0 5 mg tablet Take 1 tablet (0 5 mg total) by mouth 2 (two) times a day    cyclobenzaprine (FLEXERIL) 10 mg tablet Take 10 mg by mouth Three times daily as needed    eszopiclone (LUNESTA) 2 mg tablet Take 1 tablet (2 mg total) by mouth daily at bedtime as needed for sleep Take immediately before bedtime    fentaNYL (DURAGESIC) 100 mcg/hr TD 72 hr patch     fentaNYL (DURAGESIC) 50 mcg/hr Place 1 patch on the skin every third day Max Daily Amount: 1 patch    fentaNYL (DURAGESIC) 75 mcg/hr PLACE 1 PATCH ON THE SKIN EVERY THIRD DAY   MAX DAILY AMOUNT  1 PATCH    furosemide (LASIX) 20 mg tablet Take 1 tablet (20 mg total) by mouth daily as needed (leg swelling)    hydrOXYzine pamoate (VISTARIL) 25 mg capsule Take 1 capsule (25 mg total) by mouth 3 (three) times a day as needed for anxiety    mupirocin (BACTROBAN) 2 % ointment PLEASE SEE ATTACHED FOR DETAILED DIRECTIONS    naloxone (Narcan) 4 mg/0 1 mL nasal spray     Narcan 4 MG/0 1ML nasal spray     oxyCODONE (ROXICODONE) 15 mg immediate release tablet take 1 tablet by mouth FIVE TIMES A DAY MAX 75 MG A DAY    oxyCODONE (ROXICODONE) 20 MG TABS     phenazopyridine (PYRIDIUM) 100 mg tablet take 1 tablet by mouth three times a day if needed  FOR BLADDER SPASMS    pregabalin (LYRICA) 75 mg capsule Take 1 capsule (75 mg total) by mouth 3 (three) times a day    Rexulti 3 MG tablet TAKE 1 TABLET BY MOUTH  DAILY    sertraline (ZOLOFT) 100 mg tablet Take 2 tablets (200 mg total) by mouth daily    sulfamethoxazole-trimethoprim (BACTRIM DS) 800-160 mg per tablet take 1 tablet by mouth twice a day for 3 days (Patient not taking: Reported on 11/28/2021)        Current Facility-Administered Medications   Medication Dose Route Frequency Provider Last Rate    chlorhexidine  15 mL Mouth/Throat Q12H [address], CRNP      fluconazole  400 mg Intravenous Q24H Ewing Idol, CRNP 400 mg (12/27/21 0133)    heparin (porcine)  5,000 Units Subcutaneous [facility] Ewing Idol, CRNP      HYDROmorphone  1 mg Intravenous Q4H PRN Ewing Idol, CRNP      milrinone [facility]) infusion  0 38 mcg/kg/min Intravenous Continuous Ewing Idol, CRNP 0 38 mcg/kg/min (12/27/21 0245)    norepinephrine  1-30 mcg/min Intravenous Titrated Ewing Idol, CRNP 28 mcg/min (12/27/21 0830)    pantoprazole  40 mg Intravenous Q12H [address], CRNP      piperacillin-tazobactam  3 375 g Intravenous Q6H Ewing Idol, CRNP 3 375 g (12/27/21 0622)    propofol  5-50 mcg/kg/min Intravenous Titrated Ewing Idol, CRNP Stopped (12/27/21 0030)    sodium bicarbonate infusion  125 mL/hr Intravenous Continuous Ewing Idol, CRNP 125 mL/hr (12/27/21 0120)    thiamine  100 mg Intravenous Daily Ewing Idol, CRNP      vasopressin  0 04 Units/min Intravenous Continuous Mike Ege, CRNP 0 04 Units/min (12/27/21 0844)       milrinone (PRIMACOR) infusion, 0 38 mcg/kg/min, Last Rate: 0 38 mcg/kg/min (12/27/21 0245)  norepinephrine, 1-30 mcg/min, Last Rate: 28 mcg/min (12/27/21 0830)  propofol, 5-50 mcg/kg/min, Last Rate: Stopped (12/27/21 0030)  sodium bicarbonate infusion, 125 mL/hr, Last Rate: 125 mL/hr (12/27/21 0120)  vasopressin, 0 04 Units/min, Last Rate: 0 04 Units/min (12/27/21 0844)        Vitals:    12/27/21 0715 12/27/21 0730 12/27/21 0746 12/27/21 0800   BP:       BP Location:       Pulse: 98  100 100   Resp: (!) 24  (!) 24 (!) 24   Temp:       TempSrc:       SpO2: 93% 94% (!) 83% (!) 87%   Weight:           I/O last 3 completed shifts:   In: 5787 7 [I V :4891; IV Piggyback:896 7]  Out: 710 [Urine:395; Drains:295; Blood:20]      Intake/Output Summary (Last 24 hours) at 12/27/2021 0890  Last data filed at 12/27/2021 0748  Gross per 24 hour   Intake 6387 69 ml   Output 860 ml   Net 5527 69 ml       Weight change:     PHYSICAL EXAMINATION:  Gen: sedated, NAD, frail  Head/eyes: AT/NC, pupils equal and round, Anicteric  ENT: dry mm; +ETT  Neck: Supple, No elevated JVP, trachea midline  Resp: CTA bilaterally no w/r/r  CV: tachy reg +S1, S2, No m/r/g  Abd: Soft, decreased BS mildly distended  Ext: no LE edema bilaterally; bilateral LE healing ulcers w/ mildly cool extremities  Neuro: sedated on vent   Psych: sedated on vent  Skin: no rash, erythema or venous stasis changes on exposed skin    Lab Results:  Results from last 7 days   Lab Units 21  0414   WBC Thousand/uL 1 24*   HEMOGLOBIN g/dL 6 2*   HEMATOCRIT % 18 9*   PLATELETS Thousands/uL 72*     Results from last 7 days   Lab Units 21  0413 21  0017 21  2230   POTASSIUM mmol/L 4 6   < >  --    CHLORIDE mmol/L 99*   < >  --    CO2 mmol/L 16*   < >  --    CO2, I-STAT mmol/L  --   --  24   BUN mg/dL 18   < >  --    CREATININE mg/dL 1 80*   < >  --    CALCIUM mg/dL 5 6*   < >  --    ALK PHOS U/L 61   < >  --    ALT U/L 138*   < >  --    AST U/L 1,148*   < >  --    GLUCOSE, ISTAT mg/dl  --   --  67    < > = values in this interval not displayed       No results found for: TROPONINT      Results from last 7 days   Lab Units 21  1611   CK TOTAL U/L 290 5*   CK MB INDEX % 3 2*         Results from last 7 days   Lab Units 21  0409   INR  3 49*           Results for orders placed during the hospital encounter of 19    Echo complete with contrast if indicated    Narrative  [address]  [address] [facility]   [phone]    Transthoracic Echocardiogram  2D, M-mode, Doppler, and Color Doppler    Study date:  2019    Patient: Aldo Baeza  MR number: [id number]  Account number: [de-identified]  : 1960  Age: 61 years  Gender: Female  Status: Outpatient  Location: Echo lab  Height: 62 in  Weight: 195 6 lb  BP: 117/ 64 mmHg    Indications: Edema    Diagnoses: R60 9 - Edema, unspecified    Sonographer:  KY Dunn  Primary Physician:  Flora Lemons DO  Group:  [facility]  cc:  Elvis Foster MD  Interpreting Physician:  Dave Dinh MD    SUMMARY    LEFT VENTRICLE:  Systolic function was normal  Ejection fraction was estimated in the range of 55 % to 60 % to be 60 %  There were no regional wall motion abnormalities  Doppler parameters were consistent with abnormal left ventricular relaxation (grade 1 diastolic dysfunction)  TRICUSPID VALVE:  There was trace regurgitation  IVC, HEPATIC VEINS:  The inferior vena cava was mildly dilated  HISTORY: PRIOR HISTORY: Scoliosis, Depression, Arthritis    PROCEDURE: The procedure was performed in the echo lab  This was a routine study  The transthoracic approach was used  The study included complete 2D imaging, M-mode, complete spectral Doppler, and color Doppler  The heart rate was 80 bpm,  at the start of the study  Image quality was adequate  LEFT VENTRICLE: Size was normal  Systolic function was normal  Ejection fraction was estimated in the range of 55 % to 60 % to be 60 %  There were no regional wall motion abnormalities  Wall thickness was normal  No evidence of apical  thrombus  DOPPLER: Doppler parameters were consistent with abnormal left ventricular relaxation (grade 1 diastolic dysfunction)  RIGHT VENTRICLE: The size was normal  Systolic function was normal  Wall thickness was normal     LEFT ATRIUM: Size was normal     RIGHT ATRIUM: Size was normal     MITRAL VALVE: Valve structure was normal  There was normal leaflet separation  DOPPLER: The transmitral velocity was within the normal range  There was no evidence for stenosis  There was no significant regurgitation  AORTIC VALVE: The valve was trileaflet  Leaflets exhibited normal thickness and normal cuspal separation  DOPPLER: Transaortic velocity was within the normal range   There was no evidence for stenosis  There was no significant  regurgitation  TRICUSPID VALVE: The valve structure was normal  There was normal leaflet separation  DOPPLER: The transtricuspid velocity was within the normal range  There was no evidence for stenosis  There was trace regurgitation  The tricuspid jet  envelope definition was inadequate for estimation of RV systolic pressure  There are no indirect findings (abnormal RV volume or geometry, altered pulmonary flow velocity profile, or leftward septal displacement) which would suggest  moderate or severe pulmonary hypertension  PULMONIC VALVE: Leaflets exhibited normal thickness, no calcification, and normal cuspal separation  DOPPLER: The transpulmonic velocity was within the normal range  There was no significant regurgitation  PERICARDIUM: There was no pericardial effusion  The pericardium was normal in appearance  AORTA: The root exhibited normal size  SYSTEMIC VEINS: IVC: The inferior vena cava was mildly dilated  SYSTEM MEASUREMENT TABLES    2D mode  AoR Diam 2D: 3 3 cm  LA Diam (2D): 3 6 cm  LA/Ao (2D): 1 09  FS (2D Teich): 27 7 %  IVSd (2D): 0 97 cm  LVDEV: 79 9 cmï¾³  LVESV: 36 7 cmï¾³  LVIDd(2D): 4 23 cm  LVISd (2D): 3 06 cm  LVPWd (2D): 0 88 cm  SV (Teich): 43 2 cmï¾³    Apical four chamber  LVEF A4C: 58 %    Unspecified Scan Mode  MV Peak A Matthew: 1070 mm/s  MV Peak E Matthew  Mean: 933 mm/s  MVA (PHT): 4 23 cmï¾²  PHT: 52 ms  Max P mm[Hg]  V Max: 2540 mm/s  Vmax: 2350 mm/s  RA Area: 14 7 cmï¾²  RA Volume: 32 cmï¾³  TAPSE: 2 2 cm    [employer] Accredited Echocardiography Laboratory    Prepared and electronically signed by    Andrea Vega MD  Signed 2019 11:12:25    No results found for this or any previous visit  No results found for this or any previous visit  No results found for this or any previous visit        CXR: Results for orders placed in visit on 21    XR chest pa & lateral    Narrative  3 [phone] [id number] [id number]    No results found for this or any previous visit  ECG: ST LBBB    This note was completed in part utilizing M-Modal Fluency Direct Software  Grammatical errors, random word insertions, spelling mistakes, and incomplete sentences may be an occasional consequence of this system secondary to software limitations, ambient noise, and hardware issues  If you have any questions or concerns about the content, text, or information contained within the body of this dictation, please contact the provider for clarification

## 2021-12-27 NOTE — DEATH NOTE
INPATIENT DEATH NOTE  Lazarus Christine 64 y o  female MRN: [id number]  Unit/Bed#: ICU 14 Encounter: [id number]    Date, Time and Cause of Death    Date of Death: 21  Time of Death:  2:28 PM  Preliminary Cause of Death: Cardiac arrest [facility])           Patient's Information  Pronounced by: Isrrael Villalpando  Did the patient's death occur in the ED?: No  Did the patient's death occur in the OR?: No  Did the patient's death occur within 24 hours of admission?: Yes  Was code status DNR at the time of death?: Yes    PHYSICAL EXAM:  Unresponsive to noxious stimuli, Spontaneous respirations absent, Breath sounds absent, Carotid pulse absent, Heart sounds absent, Pupillary light reflex absent and Corneal blink reflex absent    Medical Examiner notification criteria:  Patient  within 24 hours of arrival to hospital   Medical Examiner's office notified?:  Yes   Medical Examiner accepted case?:  No  Name of Medical Examiner:     Family Notification  Was the family notified?: Yes  Date Notified: 21  Time Notified: 3692  Notified by: Isrrael Villalpando  Name of Family Notified of Death: Nicole Russell   Relationship to Patient: Spouse  Family Notification Route:  At bedside  Was the family told to contact a  home?: No    Autopsy Options:  Post-mortem examination declined by next of kin    Primary Service Attending Physician notified?:  yes - Attending:  Physician/Resident responsible for completing Discharge Summary:  Isrrael Villalpando MD

## 2021-12-27 NOTE — PROGRESS NOTES
The patients standard-infusion Piperacillin-Tazobactam / Zosyn (infused over 30-60 minutes) has been converted to extended-infusion (infused over 4 hours) per [facility] Extended-Infusion Piperacillin-Tazobactam Protocol for Adults as approved by the Pharmacy and Therapeutics Committee (accessible here on MyNET)       The patient met ALL eligible criteria:    Age >= 25years old   Critical Care patient    And did NOT have ANY exclusions:     Emergency Department or Operating Room patient  Drug incompatibilities that could NOT be avoided with timing or separate line administration    The following are reminders for Nursing regarding administration:  Infuse the first dose of Zosyn over 30min as a load (if new start), and then all subsequent doses will be given as an extended-infusion over 4 hours (see dosing below)  Use primary tubing as an intermittent infusion; change out primary tubing every 24 hours   Ensure full dose of the medication is given at the appropriate rate  Most incompatible drugs can be scheduled during times when the Zosyn is not being infused; however, if one requires administration during the same time, a separate site or lumen MUST be used  If access is limited and an incompatible medication urgently needs to be given, the Zosyn extended-infusion can be held for up to 30min (remember to flush line before/after)  Extended-infusion Zosyn does NOT require special timing around hemodialysis (it can even be given simultaneously)  If a patient needs an urgent MRI while Zosyn is infusing and there is not a MRI-compatible pump available for use, finish the infusion over the traditional length (30min) and ask Pharmacy to reschedule the next doses so that they start a few hours earlier  Pharmacy will assist nursing in troubleshooting other administration issues as they arise  Dosing for Piperacillin-Tazobactam  CrCl (mL/min) Traditional Dosing Extended-Infusion Dosing #   CrCl > 40 High-Dose  CrCl > 40 Low-Dose 4 5g Q6H (over 30min)  3 375g IV Q6H (over 30min) 3 375g IV Q8H (over 4hr)*    *1st dose loaded over 30min, then start extended-infusion dosing 4hr later   CrCl 20-40 High-Dose  CrCl 20-40 Low-Dose 3 375g IV Q6H (over 30min)  2 25g IV Q6H (over 30min)    CrCl < 20 High-Dose  CrCl < 20 Low-Dose 2 25g IV Q6H (over 30min)  2 25g IV Q8H (over 30min) 3 375g IV Q12H (over 4hr)*    *1st dose loaded over 30min, then start extended-infusion dosing 6hr later   Hemo/Peritoneal Dialysis High-Dose  Hemo/Peritoneal Dialysis Low-Dose 2 25g IV Q6H (over 30min)  2 25g IV Q8H (over 30min)    CVVH/D High-Dose  CVVH/D Low-Dose 3 375g IV Q6H (over 30min)  2 25 IV Q6H (over 30min) 3 375g IV Q8H (over 4hr)*    *1st dose loaded over 30min, then start extended-infusion dosing 4hr later   # = Use 4 5g dosing (same interval) if morbidly obese (BMI ?40)    Please call the Pharmacy with any questions or concerns    Jose Pillai, PharmD, [address] [facility] Clinical Pharmacist  [phone] or via [facility]

## 2021-12-27 NOTE — ASSESSMENT & PLAN NOTE
· S/P radiation treatment  · Normally has care provided at [facility]

## 2021-12-27 NOTE — PROGRESS NOTES
[address] [place] [address]  Progress Note - Kaley Fortune 1960, 64 y o  female MRN: [id number]  Unit/Bed#: ICU 14 Encounter: [id number]  Primary Care Provider: Jarrod Candelaria MD   Date and time admitted to hospital: 12/26/2021  9:06 PM    * Bowel perforation [facility])  Assessment & Plan  · S/p Laproscopic repair  · Primary abdominal management per bariatric surgical teams recommendations  · NPO for now  · NO NGT  · Placed on protonix BID  · Prn dilaudid for pain control    Acute respiratory failure ([facility] )  Assessment & Plan  · In the setting of her surgery  · We will continue the vent support for now  · As her hemodynamics improve, we can wean her in attempts at extubation    Septic shock ([facility] )  Assessment & Plan  · Secondary to intra-abdominal contamination int he setting of her bowel perforation  · Will continue zosyn and fluconazole for now      LASHAUN (acute kidney injury) ([facility] )  Assessment & Plan  · Likely related to her acute intra-abdominal sepsis/ perforation  · We will monitor her renal indices and urine output closely  · We will dose adjust her medications  · We will avoid hypotension    Primary malignant neoplasm of left upper lobe of lung ([facility] )  Assessment & Plan  · S/P radiation treatment  · Normally has care provided at [facility]    COPD (chronic obstructive pulmonary disease) ([facility] )  Assessment & Plan  · We will place the patient on atrovent and xopenex for now      ----------------------------------------------------------------------------------------  HPI/24hr events: s/p laproscopic repair of perforated viscus, intubated for the case    Patient appropriate for transfer out of the ICU today?: No  Disposition: Continue Critical Care   Code Status: Level 1 - Full Code  ---------------------------------------------------------------------------------------  SUBJECTIVE  Intubated and sedated    Review of Systems  Review of systems was unable to be performed secondary to intubated and sedated  ---------------------------------------------------------------------------------------  OBJECTIVE    Vitals   Vitals:    21   BP: 112/58  111/55   BP Location: Right arm     Pulse: 100  102   Resp: (!) 24     Temp:  (!) 97 4 °F (36 3 °C)    TempSrc:  Oral    SpO2: 99%  97%     Temp (24hrs), Av 4 °F (36 3 °C), Min:97 3 °F (36 3 °C), Max:97 4 °F (36 3 °C)  Current: Temperature: (!) 97 4 °F (36 3 °C)          Respiratory:  SpO2: SpO2: 97 %, SpO2 Activity: SpO2 Activity: At Rest, SpO2 Device: O2 Device: Nasal cannula  Nasal Cannula O2 Flow Rate (L/min): 4 L/min    Invasive/non-invasive ventilation settings   Respiratory  Report   Lab Data (Last 4 hours)    None         O2/Vent Data (Last 4 hours)       2300           Vent Mode AC/VC       Resp Rate (BPM) (BPM) 16       Vt (mL) (mL) 350       FIO2 (%) (%) 100       PEEP (cmH2O) (cmH2O) 6       MV 6 2                   Physical Exam  Constitutional:       Appearance: She is ill-appearing  HENT:      Head: Normocephalic  Mouth/Throat:      Mouth: Mucous membranes are moist    Eyes:      Pupils: Pupils are equal, round, and reactive to light  Cardiovascular:      Rate and Rhythm: Normal rate  Pulmonary:      Effort: Pulmonary effort is normal       Breath sounds: Rales present  Abdominal:      General: There is distension  Tenderness: There is no abdominal tenderness  Comments: Incisions are dressed   Musculoskeletal:         General: No swelling  Cervical back: Neck supple  Lymphadenopathy:      Cervical: No cervical adenopathy  Skin:     General: Skin is warm and dry     Neurological:      Comments: Intubated and sedated, was moving all extremities prior to surgery             Laboratory and Diagnostics:  Results from last 7 days   Lab Units 21  1611   WBC Thousand/uL  --  1 89* 2 85*   HEMOGLOBIN g/dL  --  11 2* 10 5*   I STAT HEMOGLOBIN g/dl 10 2*  --   --    HEMATOCRIT %  --  32 8* 30 3*   HEMATOCRIT, ISTAT % 30*  --   --    PLATELETS Thousands/uL  --  121* 146*   NEUTROS PCT %  --   --  82*   BANDS PCT %  --  14*  --    MONOS PCT %  --   --  6   MONO PCT %  --  32*  --      Results from last 7 days   Lab Units 12/26/21 2230 12/26/21 2112 12/26/21  1611   SODIUM mmol/L  --  133* 132*   POTASSIUM mmol/L  --  5 1 2 2*   CHLORIDE mmol/L  --  97* 94*   CO2 mmol/L  --  24 24   CO2, I-STAT mmol/L 24  --   --    ANION GAP mmol/L  --  12 14*   BUN mg/dL  --  19 20   CREATININE mg/dL  --  1 70* 1 84*   CALCIUM mg/dL  --  6 2* 6 9*   GLUCOSE RANDOM mg/dL  --  41* 59*   ALT U/L  --  75 27   AST U/L  --  379* 85*   ALK PHOS U/L  --  113 92 0   ALBUMIN g/dL  --  1 4* 2 0*   TOTAL BILIRUBIN mg/dL  --  1 21* 1 17          Results from last 7 days   Lab Units 12/26/21 2112 12/26/21  1654   INR  1 49* 1 55*   PTT seconds  --  46*          Results from last 7 days   Lab Units 12/26/21 2112 12/26/21  1810 12/26/21  1611   LACTIC ACID mmol/L 4 9* 5 3* 6 5*     ABG:    VBG:  Results from last 7 days   Lab Units 12/26/21  1711   PH KARRI  7 347   PCO2 KARRI mm Hg 41 4*   PO2 KARRI mm Hg 107 3*   HCO3 KARRI mmol/L 22 2*   BASE EXC KARRI mmol/L -3 3           Micro  Results from last 7 days   Lab Units 12/26/21  1655 12/26/21  1611   BLOOD CULTURE  Received in Microbiology Lab  Culture in Progress  Received in Microbiology Lab  Culture in Progress  EKG:   Imaging: I have personally reviewed pertinent reports  and I have personally reviewed pertinent films in PACS    Intake and Output  I/O       12/25 0701 12/26 0700 12/26 0701 12/27 0700    I V   250    Total Intake  250    Blood  20    Total Output  20    Net  +230                Height and Weights         There is no height or weight on file to calculate BMI    Weight (last 2 days)     None            Nutrition       Diet Orders   (From admission, onward)             Start     Ordered    12/27/21 0007  Diet NPO; Marie Dean with meds  Diet effective now        References:    Nutrtion Support Algorithm Enteral vs  Parenteral   Question Answer Comment   Diet Type NPO    NPO Except: Sips with meds    RD to adjust diet per protocol? No        12/27/21 0009                  Active Medications  Scheduled Meds:  Current Facility-Administered Medications   Medication Dose Route Frequency Provider Last Rate    albumin human  50 g Intravenous Once Melia Alberts, BETTYNP      chlorhexidine  15 mL Mouth/Throat Q12H [address], CRNP      fluconazole  400 mg Intravenous Q24H Melia Alberts CRNP      heparin (porcine)  5,000 Units Subcutaneous [facility] Melia , CRNP      HYDROmorphone  1 mg Intravenous Q4H PRN Melia , CRNP      lactated ringers  125 mL/hr Intravenous Continuous Melia  CRNP 125 mL/hr (12/27/21 0019)    norepinephrine  1-30 mcg/min Intravenous Titrated Melia , CRNP 20 mcg/min (12/27/21 0021)    pantoprazole  40 mg Intravenous Q12H [address], CRNP      piperacillin-tazobactam  3 375 g Intravenous Q6H Melia , CRNP      propofol  5-50 mcg/kg/min Intravenous Titrated Melia , CRNP 10 mcg/kg/min (12/27/21 0027)     Continuous Infusions:  lactated ringers, 125 mL/hr, Last Rate: 125 mL/hr (12/27/21 0019)  norepinephrine, 1-30 mcg/min, Last Rate: 20 mcg/min (12/27/21 0021)  propofol, 5-50 mcg/kg/min, Last Rate: 10 mcg/kg/min (12/27/21 0027)      PRN Meds:   HYDROmorphone, 1 mg, Q4H PRN        Invasive Devices Review  Invasive Devices  Report    Central Venous Catheter Line            CVC Central Lines 12/26/21 Triple Left Femoral <1 day          Peripheral Intravenous Line            Peripheral IV 12/26/21 Dorsal (posterior); Right  1 day    Peripheral IV 12/26/21 Right Antecubital <1 day          Drain            Closed/Suction Drain Lateral;Left;Superior LUQ Bulb 19 Fr  1 day    Closed/Suction Drain Left; Inferior Abdomen Bulb 19 Fr  1 day    Urethral Catheter Latex 16 Fr  <1 day          Airway            ETT  Hi-Lo; Cuffed 7 5 mm <1 day                Rationale for remaining devices:   ---------------------------------------------------------------------------------------  Advance Directive and Living Will:      Power of :    POLST:    ---------------------------------------------------------------------------------------  Care Time Delivered:         JONO Bernal      Portions of the record may have been created with voice recognition software  Occasional wrong word or "sound a like" substitutions may have occurred due to the inherent limitations of voice recognition software    Read the chart carefully and recognize, using context, where substitutions have occurred

## 2021-12-27 NOTE — PROGRESS NOTES
Progress Note - Bariatric Surgery   Craig Rodriguez 64 y o  female MRN: [id number]  Unit/Bed#: ICU 14 Encounter: [id number]      Subjective/Objective     Subjective: Patient with hx of RNYGB, marginal ulcer perforation four years ago and open abimbola presented yesterday with acute onset abdominal pain found to have free air on CT scan  On presentation she was hypotensive with a lactic of 6 5  Started on levophed at the OSH prior to transferring to [facility] for further management     Patient is now POD1 s/p diagnostic laparoscopy for GJ perforation with primary repair and drain placement  She remained intubated and on levophed following the case and was taken to the ICU     Overnight the patient deteriorated becoming acidotic with a pH of 7  Lactic acid increased from 4 9 to 11  Bedside ECHO showed severe LV dysfunction with an EF of 20-30%  She was started on a bicarb drip as well as milrinone  Her lactic acid continued to rise, hitting a peak of 14  Her H/H trended down from 8 9 post op to 6 8  2U pRBCs were ordered  At this point a CTA was ordered  Read is still pending    Objective:    /55   Pulse 94   Temp (!) 97 1 °F (36 2 °C) (Temporal)   Resp 20   Wt 60 kg (132 lb 4 4 oz)   SpO2 99%   BMI 24 19 kg/m²       Intake/Output Summary (Last 24 hours) at 12/27/2021 0651  Last data filed at 12/27/2021 7869  Gross per 24 hour   Intake 5256 92 ml   Output 710 ml   Net 4546 92 ml       Invasive Devices  Report    Central Venous Catheter Line            CVC Central Lines 12/26/21 Triple Left Femoral <1 day          Peripheral Intravenous Line            Peripheral IV 12/26/21 Dorsal (posterior); Right  1 day    Peripheral IV 12/26/21 Right Antecubital <1 day          Drain            Closed/Suction Drain Lateral;Left;Superior LUQ Bulb 19 Fr  1 day    Closed/Suction Drain Left; Inferior Abdomen Bulb 19 Fr  1 day    Urethral Catheter Latex 16 Fr  <1 day          Airway            ETT  Hi-Lo; Cuffed 7 5 mm <1 day ROS: 10-point system completed  All negative except see HPI  Physical Exam    General Appearance:    Intubated and sedated   Head:    Normocephalic, without obvious abnormality, atraumatic   Lungs:     Intubated   Heart:    Tachycardic and regular rhythm   Abdomen:     Soft, no masses, no organomegaly, mildly distended   Extremities:   Lower extremities mottled, cool to touch, fermin hugger in place   Neurologic:  Incision:    Psych:   Normal strength and sensation    Clean, dry, and intact, no bleeding, both NICOLA drains with serosang output    Unable to assess       Lab, Imaging and other studies:I have personally reviewed pertinent lab results  VTE Mechanical Prophylaxis: sequential compression device    Assessment/Plan  1)  Patient with hx of lap RNYGB now s/p diagnostic laparoscopy for GJ perforation with primary repair and drain placement  - Follow up CTA read  - continue to trend H/H and lactic acid  - aggressive fluid resuscitation   - Continue IV Abx  - Follow up cardiology recommends  - Continue care per ICU team    Plan of care was discussed with patient    Care plan discussed with Dr Johnathan Griffith MD  Bariatric Surgery  12/27/2021  6:51 AM

## 2021-12-27 NOTE — ED NOTES
Per OR provider, start another 20 meq potassium IV  Next dose started now through central line        Phuong Valdovinos RN  12/26/21 0355

## 2021-12-27 NOTE — QUICK NOTE
Notified by ICU team that patient became more hypotensive, despite receiving crystalloid and colloid resuscitation  She had PEA and received 3 minutes of CPR with ROSC  Patient seen at bedside, she remains intubated, and requiring epinephrine, norepinephrine, vasopressin, and milrinone  Abdomen remains unchanged from this AM  There is serosanguinous drainage from NICOLA drain  No evidence of hemorrhage noted  She remains in critical condition  Patient family aware of prognosis  They have elected for a DNR status should she have another cardiac event  There is no surgical intervention warranted  Continue medical care for cardiopulmonary support per ICU

## 2021-12-27 NOTE — ASSESSMENT & PLAN NOTE
· In the setting of her surgery  · We will continue the vent support for now  · As her hemodynamics improve, we can wean her in attempts at extubation

## 2021-12-27 NOTE — ASSESSMENT & PLAN NOTE
· Secondary to intra-abdominal contamination int he setting of her bowel perforation  · Will continue zosyn and fluconazole for now

## 2021-12-27 NOTE — ASSESSMENT & PLAN NOTE
· S/p Laproscopic repair  · Primary abdominal management per bariatric surgical teams recommendations  · NPO for now  · NO NGT  · Placed on protonix BID  · Prn dilaudid for pain control

## 2021-12-27 NOTE — PROGRESS NOTES
Progress Note - Critical Care   Doc Pearson 64 y o  female MRN: [id number]  Unit/Bed#: ICU 14 Encounter: [id number]    Patient arrived to the ICU, intubated  Required norepinephrine drip at max 30 mics/minute  Initial labs showed significant lactic acidosis at 11 although it was trending down preop after 3 L fluid resuscitation  ABG with worsening metabolic acidosis, partially compensated respiratory alkalosis  Bedside TTE showed severe LV dysfunction EF 20-30% however blood pressure remained map above 65 most of the time  She was given 500 cc fluid bolus x2 and albumin bolus (total of 6L fluid boluses)  EKG showed what appears to be a new LBBB, compared to last EKG in 2020  Discussed with cardiology, this is likely stress-induced cardiomyopathy, troponin was not significantly elevated  Repeat troponin was not significantly elevated, less likely that she had acute intra op MI, more consistent with SICM, will check troponin delta in 4 hours  After adding the Milrinone, and norepinephrine at 30 mics with stable mean arterial pressure, lactic acid continues to rise to 14 raising the suspicion of bowel ischemia/pathology is underlying etiology of the elevated lactate specially the renal function is not significantly impaired and patient had stable mean arterial blood pressure  Discussed with surgery team Dr Maikol Villafuerte  Reported that patient was a poor surgical candidate to begin with (due to multiple surgeries before, multiple adhesions and cannot perform open surgery at this time), already discussed that with the family  She had 2 bowel perforations before, and continues to smoke  At this point, she would not be a surgical candidate if she were to have a large bowel ischemia or other procedure the requires an open laparotomy  But she could be a surgical candidate for another ex lap with small window if she were to have a small area of ischemia    Also noted down trending hemoglobin to 6 8 from 8 9 3 hours ago   We will obtain a CTA abdomen and pelvis to rule out significant bowel ischemia and or bleeding  For now, changes have made for increasing minute ventilation to improve a acidosis, started on bicarb drip  Continue antibiotics Zosyn/fluconazole  Continue melatonin and norepinephrine drip, avoid further fluid boluses  2 unit PRBCs  Continue to trend lactic acid and troponin  Further management based on the CTA results

## 2021-12-27 NOTE — DISCHARGE SUMMARY
[facility]  Discharge- Elrod Began 1960, 64 y o  female MRN: [id number]  Unit/Bed#: ICU 14 Encounter: [id number]  Primary Care Provider: Alexander Blanco MD   Date and time admitted to hospital: 12/26/2021  9:06 PM    * Septic shock [facility])  Assessment & Plan  · Secondary to intra-abdominal contamination in the setting of her bowel perforation  · Was treated with IV abx   · Unfortunately patient had decreased EF in the setting of septic shock   · Patient was maintained of levophed, milnarone and vasopressin   · Eventually patient become hypotensive and coded  · ROSC was achieved after 3 mintues, decision by family was made at this time for patient to become DNR  · Patient's TOD was called at 2:28 pm       Bowel perforation [facility])  Assessment & Plan  · S/p Laproscopic repair  · Patient had at least 1L of murky purulent discharge drained at the time of perforation  · By the time of presentation to the Ed the patient was already in septic shock  · Continued to require pressors through out surgery, had perforation closed    · Pt was treated with IV abx     Acute respiratory failure ([address] )  Assessment & Plan  · In the setting of her surgery  · Was continued on vent support    LASHAUN (acute kidney injury) ([address] )  Assessment & Plan  · Likely related to her acute intra-abdominal sepsis/ perforation      Primary malignant neoplasm of left upper lobe of lung [facility])  Assessment & Plan  · S/P radiation treatment          Medical Problems             Resolved Problems  Date Reviewed: 12/27/2021    None                Admission Date:   Admission Orders (From admission, onward)     Ordered        12/26/21 2127  Inpatient Admission  Once                        Admitting Diagnosis: Septic shock ([address] ) [A41 9, R65 21]    HPI/Summary of Hospital Course: 65 y/o F with PMH of bariatric surgery roughly 20 years ago w/ multiple abdominal surgeries presented to [facility] after experiencing sudden onset abdominal pain  Patient was found to be in septic shock and imaging showed evidence of small bowel perforation  Patient was taken for emergent surgery w/p successful repair of perforation  However, patient continued to require pressors and was unable to be weaned off  Patient was also found to have a decreased EF and in DIC  Patient continued to decompensate despite IV resuscitation, antibiotics and pressors  Eventually patient coded, requiring 3 minutes of CPR  Decision to make patient DNR was made  Patinet became bradycardic with eventual asystole  TOD was called at 1428       Procedures Performed:   Orders Placed This Encounter   Procedures    Cardiac catheterization         Date, Time and Cause of Death    Date of Death: 21  Time of Death:  2:28 PM  Preliminary Cause of Death: Cardiac arrest [facility])         PCP: Jose E Calabrese MD    Disposition:

## 2021-12-27 NOTE — QUICK NOTE
Notified by ICU that patient   Family was spoken to earlier in the day by Dr Rosey Palmer  Patient family remained at bedside during her final moments

## 2021-12-27 NOTE — OP NOTE
[facility]   [address] [facility], [address]  [phone] (Fax)      Operative Report  LAPAROSCOPY DIAGNOSTIC     Patient Name: Tanna Lopez    :  1960  MRN: [id number]  Patient Location: [facility] OR ROOM 06  Surgery Date : 2021  Surgeons:  Surgeon(s) and Role:     * Marjorie Martin MD - Primary     * Ryan De Dios MD - Assistant    Diagnosis:    Pre-Op Diagnosis Codes:  Septic shock ([address] ) [A41 9, R65 21]  Pneumoperitoneum [K66 8]    Post-Op Diagnosis Codes:     * Septic shock ([address] ) [A41 9, R65 21]     * Pneumoperitoneum [K66 8]     * Perforated marginal ulcer    Procedure  1  Diagnostic Laparoscopy  2  Extensive Lysis of Adhesions  3  Marginal ulcer perforation repair  4  Extensive abdominal washout    Specimen(s):  * No specimens in log *    Estimated Blood Loss:    20 cc  Urethral Catheter Latex 16 Fr  (Active)   Number of days: 0       Anesthesia Type:     General    Operative Indications:    Septic shock ([address] ) [A41 9, R65 21]  Pneumoperitoneum [K66 8]  Perforated marginal ulcer    Operative Findings:    Extensive amount of purulent peritonitis (1,000 cc) secondary to a perforated anterior marginal ulcer    Complications:     None    Procedure and Technique:    INDICATION:    Tanna Lopez is a 64 y o  female that had a laparoscopic Edis-en-Y gastric bypass almost 15 years ago by Dr Juan Hall  Four years ago she presented to [facility] with a perforated marginal ulcer required an emergent operation and most likely a Wyatt patch repair  She has an extensive surgical history including an open cholecystectomy and appendectomy as well as the previous described operations  Unfortunately she has continued to smoke and she presented today once again to [facility] with peritonitis and was found to have another perforation    She was found to have peritoneal signs and she was transferred on pressors to [facility] to go to the OR for another diagnostic laparoscopy  OPERATIVE TECHNIQUE    The patient was taken to the operating room and placed in a supine position  A dose of IV antibiotic prophylaxis that consisted of Ancef 2g and Metronidazole 500mg was given  Sequential compression devices were placed on both lower extremities  After satisfactory general anesthesia induction and endotracheal intubation was achieved, the extremities were secured to prevent neurovascular and musculoskeletal injuries as best as possible  Subsequently, the abdominal wall was prepped and draped in a surgical standard sterile fashion  After a timeout was done and the patient was properly identified and the type of procedure was confirmed a supra-umbilical transverse skin incision was made, and the subcutaneous tissues dissected  Access to the peritoneal cavity was gained with a Veress needle technique and an optical trocar on the right flank  With this device, we were able to visualize the layers of the abdominal wall, and enter the peritoneal cavity under direct visualization  Pneumoperitoneum was then established with CO2 insufflation  Under direct laparoscopic visualization, four additional trocars were placed: a 5 mm in the right lower quadrant in the anterior axillary line, a 12-mm port was placed in the periumbilical area, a 5-mm port was placed in the left upper quadrant subcostal position in the midclavicular line and another 5-mm port was placed in the left quadrant anterior axillary line lateral to the supraumbilical port  A significant amount of purulent peritonitis and inflammatory reaction was encountered  We suction approximately 1000 cc of purulent murky fluid    The omentum and Edis limb was densely adherent to the area of the [address] anastomosis and this was peeled off carefully until we found the perforated marginal ulcer  It was a round 3 mm perforation with purulent and fibrinous exudate surrounding it      We irrigated the area and placed a Lembert sutures using 2 0 Vicryl imbricating this perforated ulcer within the jejunum and the pouch  The rest of the abdomen had dense adhesions given the appearance almost of a frozen abdomen and I was not able to run the bowel distally order to visualize the remnant  I then proceeded to irrigate the entire abdominal cavity as best as we could as visualization was challenging with 7 L of normal saline until the irrigant coming out of the suction was clear  The whole G-J anastomotic area was sprayed with [facility]   Once again given the dense intra-abdominal adhesions I could not find a piece of omentum to use as a [place] patch  A 19 Indonesian Feng drain was placed above the ulcer repair and the gastrojejunal anastomosis and was left in situ with the tip pointing towards the spleen  An additional [address] was placed laterally with the tip pointing towards the spleen the loss    The sponge, needle and instrument count was reported complete  The 73-MU periumbilical optical port site was then closed with the use of a suture closure device and a 0 absorbable suture  The remaining ports were then also removed under laparoscopic visualization  The skin incisions were all closed with 4-0 absorbable subcuticular suture  The patient tolerated the procedure well, was extubated uneventfully and was transferred to the recovery room in stable condition  I was present for the entire length of the procedure as the attending of record  No qualified resident was available to assist   The presence of an assistant was necessary for camera holding, traction and counter traction and for help with suturing and irrigating  Patient Disposition:    ICU intubated and critically ill on pressors      Signature: Ale Zuniga MD  Date: December 26, 2021  Time: 11:20 PM

## 2021-12-28 LAB
ABO GROUP BLD BPU: NORMAL
ATRIAL RATE: 97 BPM
BPU ID: NORMAL
CROSSMATCH: NORMAL
CROSSMATCH: NORMAL
P AXIS: 38 DEGREES
PR INTERVAL: 140 MS
QRS AXIS: 33 DEGREES
QRSD INTERVAL: 101 MS
QT INTERVAL: 333 MS
QTC INTERVAL: 421 MS
T WAVE AXIS: 53 DEGREES
UNIT DISPENSE STATUS: NORMAL
UNIT PRODUCT CODE: NORMAL
UNIT PRODUCT VOLUME: 125 ML
UNIT PRODUCT VOLUME: 259 ML
UNIT PRODUCT VOLUME: 320 ML
UNIT PRODUCT VOLUME: 335 ML
UNIT PRODUCT VOLUME: 350 ML
UNIT PRODUCT VOLUME: 350 ML
UNIT RH: NORMAL
VENTRICULAR RATE: 96 BPM

## 2021-12-28 NOTE — UTILIZATION REVIEW
Initial Clinical Review     TRANSFER FROM [facility]     Admission: Date/Time/Statement:       Admission Orders (From admission, onward)              Ordered          12/26/21 2127   Inpatient Admission  Once                                Orders Placed This Encounter   Procedures    Inpatient Admission       Standing Status:   Standing       Number of Occurrences:   1       Order Specific Question:   Level of Care       Answer:   Critical Care [15]       Order Specific Question:   Estimated length of stay       Answer:   More than 2 Midnights       Order Specific Question:   Certification       Answer:   I certify that inpatient services are medically necessary for this patient for a duration of greater than two midnights  See H&P and MD Progress Notes for additional information about the patient's course of treatment             ED Arrival Information      Expected Arrival Acuity     12/26/2021 12/26/2021 21:06 Emergent           Means of arrival Escorted by Service Admission type     Ambulance [facility] Emergency           Arrival complaint     Free air in abdomen               Chief Complaint   Patient presents with    Abdominal Pain       chance transfer  intractable abd pain  intial pressure in 60s at [place]  pt alert and oriented   Hypotension         Initial Presentation: 64  Y O female initially presented to ED at  [facility] after an acute onset of abdominal pain  Hypotensive and tachycardic  n ED arrival, started on levophed  CT scan showed free air  Labs revealed lactic  Of  6 5 and dropped to 5 3 after IVF  PMH is  RNYGB  2005 and revision about  4 years ago and current smoker  Transferred to [facility] for further care  Admit  Ip  With  Acute abdominal pain, free air on  Ct scan and plan is   CHELSEA and surgical intervention        Surgery    12/26   10:10 PM  Procedure  1  Diagnostic Laparoscopy  2  Extensive Lysis of Adhesions  3   Marginal ulcer perforation repair  4  Extensive abdominal washout        Operative Findings:     Extensive amount of purulent peritonitis (1,000 cc) secondary to a perforated anterior marginal ulcer        Date: 12/27          Day 2:   She remains critically ill requiring two pressors without significant improvement  I have been in close contact with the ICU doctor Dr Shirlee Boas and we are trying to optimize her as best as we can  The CT today showed no further perforation but some residual air from the laparoscopy  She has dilated loops of small bowel but this is most likely due to an ileus given her severe infected peritonitis, septic shock and systemic inflammatory response  I have personally spoken with the family and they are aware of how critically ill she is and that at this point no further surgical intervention is warranted  This is her second marginal ulcer perforation secondary to smoking and her 5th surgery overall including her gastric bypass, an open gallbladder and appendectomy  The lower and mid portion of her abdomen was almost frozen due to adhesions and we were fortunate to find a window to do her ulcer repair laparoscopically  Continue full support for now  S/P  2  U PRBC  For hemoglobin drop to  6 8  Continue  CHELSEA,  IVF    Remains intubated             Wt Readings from Last 1 Encounters:   12/27/21 59 9 kg (132 lb)      Additional Vital Signs:   27/21 0708 97 2 °F (36 2 °C) Abnormal  96 24 Abnormal  -- -- 116/58 70 mmHg 94 % -- -- -- --   12/27/21 0700 -- 98 24 Abnormal  -- -- 116/58 72 mmHg 94 % -- -- -- --   12/27/21 0600 -- 96 24 Abnormal  -- -- 92/56 68 mmHg -- -- -- -- --   12/27/21 0500 -- 94 20 -- -- 94/62 74 mmHg -- -- -- -- --   12/27/21 0438 -- -- -- -- -- 88/50 64 mmHg -- -- -- -- --   12/27/21 0400 97 1 °F (36 2 °C) Abnormal  94 24 Abnormal  -- -- 90/54 66 mmHg -- -- -- -- --   12/27/21 0330 -- 94 24 Abnormal  -- -- 98/58 72 mmHg 99 % -- -- Ventilator --   12/27/21 0301 -- -- -- -- -- -- -- 92 % -- -- -- --   12/27/21 0300 96 8 °F (36 °C) Abnormal  90 24 Abnormal  -- -- 82/58 68 mmHg -- -- -- -- --   12/27/21 0200 -- 98 16 -- -- 88/62 72 mmHg -- -- -- -- --   12/27/21 0147 -- 94 16 -- -- 58/44 50 mmHg -- -- -- -- --   12/27/21 0143 -- 94 15 -- -- 62/50 54 mmHg -- -- -- -- --   12/27/21 0130 -- 96 15 -- -- 80/62 70 mmHg 87 % Abnormal  -- -- -- --   12/27/21 0106 -- 96 15 -- -- 86/68 76 mmHg 100 % -- -- Ventilator --   12/27/21 0100 -- 96 16 -- -- 92/70 80 mmHg -- -- -- -- --   12/27/21 0030 -- 92 15 -- -- 86/68 74 mmHg -- -- -- -- --   12/27/21 0000 96 2 °F (35 7 °C) Abnormal  88 16 -- -- 86/68 76 mmHg -- -- -- -- --   12/26/21 2130 -- 102 -- 111/55 79 -- -- 97 % -- -- -- --   12/26/21 2115 97 4 °F (36 3 °C) Abnormal  -- -- -- -- -- -- -- -- -- -- --   12/26/21 2113 -- 100 24 Abnormal  112/58 78 -- -- 99 % 36 4 L/min Nasal cannula Lying         Pertinent Labs/Diagnostic Test Results:   CT  Chest/abd/pelvis  ( 12/26)   Scattered pneumoperitoneum largely in the upper abdomen near the stomach  Given the history of prior gastric bypass surgery, gastric or proximal small bowel perforation should be considered   Mild to moderate ascites  2   Smaller residual left upper lobe nodular density compatible with response to therapy  3   New small left pleural effusion and trace right pleural effusion  4  New mild to moderate compression deformity at T12 related to the superior endplate   New mild superior endplate deformity at L2      Ct  C/spine  ( 12/26)     No cervical spine fracture or traumatic malalignment  Multilevel degenerative changes     Ct  Head  ( 12/26)    No acute abnormality  X ray  R  Shoulder  ( 12/27)    No abnormality  CXR  ( 12/27)       Endotracheal tube tip approximately 2 5 cm above the lyn   Patchy foci of airspace disease in the left upper lung field and the left base with small left effusion       Results from last 7 days   Lab Units 12/26/21  1545   SARS-COV-2   Negative     Results from last 7 days   Lab Units 12/27/21  0414 12/27/21  0326 12/27/21  0017 12/26/21 2230 12/26/21 2112 12/26/21  1611 12/26/21  1611   WBC Thousand/uL 1 24*  --  1 25*  --  1 89*   < > 2 85*   HEMOGLOBIN g/dL 6 2* 6 8* 8 9*  --  11 2*   < > 10 5*   I STAT HEMOGLOBIN g/dl  --   --   --  10 2*  --   --   --    HEMATOCRIT % 18 9*  --  27 4*  --  32 8*  --  30 3*   HEMATOCRIT, ISTAT %  --   --   --  30*  --   --   --    PLATELETS Thousands/uL 72*  --  119*  --  121*   < > 146*   NEUTROS ABS Thousands/µL  --   --   --   --   --   --  2 32   BANDS PCT %  --   --   --   --  14*  --   --     < > = values in this interval not displayed                    Results from last 7 days   Lab Units 12/27/21 0413 12/27/21 0017 12/26/21 2230 12/26/21 2112 12/26/21  1611   SODIUM mmol/L 137 134*  --  133* 132*   POTASSIUM mmol/L 4 6 5 0  --  5 1 2 2*   CHLORIDE mmol/L 99* 101  --  97* 94*   CO2 mmol/L 16* 14*  --  24 24   CO2, I-STAT mmol/L  --   --  24  --   --    ANION GAP mmol/L 22* 19*  --  12 14*   BUN mg/dL 18 19  --  19 20   CREATININE mg/dL 1 80* 1 63*  --  1 70* 1 84*   EGFR ml/min/1 73sq m 29 33  --  32 29   CALCIUM mg/dL 5 6* 6 7*  --  6 2* 6 9*   CALCIUM, IONIZED mmol/L 0 82* 1 02*  --   --   --    MAGNESIUM mg/dL  --  2 2  --   --   --               Results from last 7 days   Lab Units 12/27/21  0413 12/27/21  0017 12/26/21 2112 12/26/21  1611   AST U/L 1,148* 438* 379* 85*   ALT U/L 138* 109* 75 27   ALK PHOS U/L 61 89 113 92 0   TOTAL PROTEIN g/dL 4 1* 3 1* 3 9* 4 0*   ALBUMIN g/dL 2 8* 0 9* 1 4* 2 0*   TOTAL BILIRUBIN mg/dL 1 11* 0 69 1 21* 1 17                  Results from last 7 days   Lab Units 12/27/21  0413 12/27/21  0017 12/26/21  2112 12/26/21  1611   GLUCOSE RANDOM mg/dL 132 82 41* 59*             Results from last 7 days   Lab Units 12/27/21  0413 12/27/21  0141 12/27/21  0023   PH ART   7 202* 7 152* 7 022*   PCO2 ART mm Hg 33 9* 42 6 39 0   PO2 ART mm Hg 162 0* 205 9* 200 8*   HCO3 ART mmol/L 13 0* 14 6* 9 9*   BASE EXC ART mmol/L -13 8 -13 2 -19 9   O2 CONTENT ART mL/dL 9 0* 10 6* 13 2*   O2 HGB, ARTERIAL % 96 7 97 7* 98 2*   ABG SOURCE    --  Line, Arterial Line, Arterial            Results from last 7 days   Lab Units 12/27/21  0700 12/26/21  1711   PH KARRI   7 147* 7 347   PCO2 KARRI mm Hg 47 2 41 4*   PO2 KARRI mm Hg 42 0 107 3*   HCO3 KARRI mmol/L 16 0* 22 2*   BASE EXC KARRI mmol/L -12 1 -3 3   O2 CONTENT KARRI ml/dL 6 7 13 8   O2 HGB, VENOUS % 60 8 93 9*           Results from last 7 days   Lab Units 12/26/21  2230   I STAT BASE EXC mmol/L -2   ISTAT PH ART   7 343*   I STAT ART PCO2 mm HG 42 7   I STAT ART PO2 mm HG >400 0*   I STAT ART HCO3 mmol/L 23 2           Results from last 7 days   Lab Units 12/26/21  1611   CK TOTAL U/L 290 5*   CK MB INDEX % 3 2*   CK MB ng/mL 9 3*              Results from last 7 days   Lab Units 12/27/21  0919 12/27/21  0658 12/26/21  1810 12/26/21  1611   HS TNI 0HR ng/L  --  121*  --  37   HS TNI 2HR ng/L 234*  --  39  --    HSTNI D2 ng/L 113  --  2  --                    Results from last 7 days   Lab Units 12/27/21  0409 12/27/21  0017 12/26/21  2112 12/26/21  1654 12/26/21  1654   PROTIME seconds 33 7* 17 6* 17 6*   < > 18 3*   INR   3 49* 1 49* 1 49*   < > 1 55*   PTT seconds  --   --   --   --  46*    < > = values in this interval not displayed                  Results from last 7 days   Lab Units 12/27/21  0359 12/27/21  0017 12/26/21  1611   PROCALCITONIN ng/ml 32 20* 37 28* 37 47*                 Results from last 7 days   Lab Units 12/27/21  0658 12/27/21  0413 12/27/21  0128 12/27/21  0017 12/26/21  2112 12/26/21  1810 12/26/21  1611   LACTIC ACID mmol/L 12 3* 13 8* 14 4* 11 3*  11 3* 4 9* 5 3* 6 5*                 Results from last 7 days   Lab Units 12/26/21  1611   LIPASE u/L <6*                 Results from last 7 days   Lab Units 12/26/21  1545   INFLUENZA A PCR   Negative   INFLUENZA B PCR   Negative   RSV PCR   Negative                  Results from last 7 days   Lab Units 12/26/21  1655 12/26/21  1611   BLOOD CULTURE   Received in Microbiology Lab  Culture in Progress    --    GRAM STAIN RESULT    --  Gram negative rods*                 ED Treatment:              Medication Administration from 12/26/2021 1953 to 12/26/2021 2149        Date/Time Order Dose Route Action Comments       12/26/2021 2127 norepinephrine (LEVOPHED) 4 mg (STANDARD CONCENTRATION) IV in sodium chloride 0 9% 250 mL   Intravenous Canceled Entry         12/26/2021 2135 ceFAZolin (ANCEF) IVPB (premix in dextrose) 2,000 mg 50 mL   Intravenous Canceled Entry         12/26/2021 2135 bupivacaine liposomal (EXPAREL) 1 3 % injection 20 mL 20 mL Infiltration Given by Other            Present on Admission:   Primary malignant neoplasm of left upper lobe of lung ([facility] )   LASHAUN (acute kidney injury) ([place] [address] )   Septic shock ([place] [address] )   COPD (chronic obstructive pulmonary disease) ([facility])        Admitting Diagnosis: Septic shock ([place] [address] ) [A41 9, R65 21]  Age/Sex: 64 y o  female  Admission Orders:  Scheduled Medications:  chlorhexidine, 15 mL, Mouth/Throat, Q12H [address]  fluconazole, 400 mg, Intravenous, Q24H  heparin (porcine), 5,000 Units, Subcutaneous, Q8H CHAVEZ  pantoprazole, 40 mg, Intravenous, Q12H CHAVEZ  piperacillin-tazobactam, 3 375 g, Intravenous, Q8H  thiamine, 100 mg, Intravenous, Daily        Continuous IV Infusions:  milrinone (PRIMACOR) infusion, 0 38 mcg/kg/min, Intravenous, Continuous  norepinephrine, 1-30 mcg/min, Intravenous, Titrated  propofol, 5-50 mcg/kg/min, Intravenous, Titrated  sodium bicarbonate infusion, 125 mL/hr, Intravenous, Continuous  vasopressin, 0 04 Units/min, Intravenous, Continuous        PRN Meds:  HYDROmorphone, 1 mg, Intravenous, Q4H PRN     Fall precautions  Dysphagia eval  Neuro checks q 1 hr     IP CONSULT TO CASE MANAGEMENT  IP CONSULT TO CARDIOLOGY     Network Utilization Review Department  ATTENTION: Please call with any questions or concerns to [phone] and carefully listen to the prompts so that you are directed to the right person  All voicemails are confidential   Kelly Alley all requests for admission clinical reviews, approved or denied determinations and any other requests to dedicated fax number below belonging to the campus where the patient is receiving treatment   List of dedicated fax numbers for the Facilities:  [address] DENIALS (Administrative/Medical Necessity) [phone]   1000  [facility] (Maternity/NICU/Pediatrics) [phone]   [address]  [address] [facility] [place] [address] [address] [place] [address] Simran Leger [address] [facility] [phone]

## 2021-12-29 LAB
BACTERIA BLD CULT: ABNORMAL
GRAM STN SPEC: ABNORMAL

## 2021-12-29 NOTE — UTILIZATION REVIEW
Notification of Discharge   This is a Notification of Discharge from our facility [address]  Please be advised that this patient has been discharge from our facility  Below you will find the admission and discharge date and time including the patients disposition  UTILIZATION REVIEW CONTACT:  Bonnie Joseph  Utilization   Network Utilization Review Department  Phone: [phone] x carefully listen to the prompts  All voicemails are confidential   Email: [email or web address]     PHYSICIAN ADVISORY SERVICES:  FOR [facility] REVIEW - MEDICAL NECESSITY DENIAL  Phone: [phone]  Fax: [phone]  Email: [email or web address]     PRESENTATION DATE: 2021  9:06 PM  OBERVATION ADMISSION DATE:   INPATIENT ADMISSION DATE: 21  9:06 PM   DISCHARGE DATE: 2021  6:18 PM  DISPOSITION:        IMPORTANT INFORMATION:  Send all requests for admission clinical reviews, approved or denied determinations and any other requests to dedicated fax number below belonging to the campus where the patient is receiving treatment   List of dedicated fax numbers:  [address] DENIALS (Administrative/Medical Necessity) [phone]   [address] (Maternity/NICU/Pediatrics) [phone]   Monisha Olson [phone]   Yessica Cabral [phone]   [facility] [place]  [phone]   [facility] [address] [place] [address] [phone]   [facility]  [phone]   2205 [facility]  2401 [facility] 1000 [facility] [phone]

## 2022-01-01 LAB
BACTERIA BLD CULT: ABNORMAL
GRAM STN SPEC: ABNORMAL

## 2022-01-04 NOTE — UTILIZATION REVIEW
Notification of Discharge   This is a Notification of Discharge from our facility [address]  Please be advised that this patient has been discharge from our facility  Below you will find the admission and discharge date and time including the patients disposition  UTILIZATION REVIEW CONTACT:  Brenda Miguel  Utilization   Network Utilization Review Department  Phone: [phone] x carefully listen to the prompts  All voicemails are confidential   Email: [email or web address]     PHYSICIAN ADVISORY SERVICES:  FOR [id number] REVIEW - MEDICAL NECESSITY DENIAL  Phone: [phone]  Fax: [phone]  Email: [email or web address]     PRESENTATION DATE: 2021  9:06 PM    INPATIENT ADMISSION DATE: 21  9:06 PM   DISCHARGE DATE: 2021  6:18 PM  DISPOSITION:        IMPORTANT INFORMATION:  Send all requests for admission clinical reviews, approved or denied determinations and any other requests to dedicated fax number below belonging to the campus where the patient is receiving treatment   List of dedicated fax numbers:  [address] DENIALS (Administrative/Medical Necessity) [phone]   [address] (Maternity/NICU/Pediatrics) [phone]   Monisha Wesley [phone]   130 [facility] [phone]   [address] [place] [address] [phone]   Sania [facility] 1525 [facility] [phone]   [facility]  [phone]   2205 [facility]  2401 [facility] 1000 W [facility] [phone]

## 2022-02-15 NOTE — PATIENT INSTRUCTIONS
Avoid taking Klonopin due to low BP  Call with problems or concerns  Ask about spinal cord stimulator Problem: MOBILITY - ADULT  Goal: Maintain or return to baseline ADL function  Description: INTERVENTIONS:  -  Assess patient's ability to carry out ADLs; assess patient's baseline for ADL function and identify physical deficits which impact ability to perform ADLs (bathing, care of mouth/teeth, toileting, grooming, dressing, etc )  - Assess/evaluate cause of self-care deficits   - Assess range of motion  - Assess patient's mobility; develop plan if impaired  - Assess patient's need for assistive devices and provide as appropriate  - Encourage maximum independence but intervene and supervise when necessary  - Involve family in performance of ADLs  - Assess for home care needs following discharge   - Consider OT consult to assist with ADL evaluation and planning for discharge  - Provide patient education as appropriate  Outcome: Progressing  Goal: Maintains/Returns to pre admission functional level  Description: INTERVENTIONS:  - Perform BMAT or MOVE assessment daily    - Set and communicate daily mobility goal to care team and patient/family/caregiver  - Collaborate with rehabilitation services on mobility goals if consulted  - Perform Range of Motion 3 times a day  - Reposition patient every 2 hours    - Dangle patient 3 times a day  - Stand patient 3 times a day  - Ambulate patient 3 times a day  - Out of bed to chair 3 times a day   - Out of bed for meals 3 times a day  - Out of bed for toileting  - Record patient progress and toleration of activity level   Outcome: Progressing     Problem: Potential for Falls  Goal: Patient will remain free of falls  Description: INTERVENTIONS:  - Educate patient/family on patient safety including physical limitations  - Instruct patient to call for assistance with activity   - Consult OT/PT to assist with strengthening/mobility   - Keep Call bell within reach  - Keep bed low and locked with side rails adjusted as appropriate  - Keep care items and personal belongings within reach  - Initiate and maintain comfort rounds  - Make Fall Risk Sign visible to staff  - Offer Toileting every 2 Hours, in advance of need  - Initiate/Maintain alarm  - Obtain necessary fall risk management equipment:   - Apply yellow socks and bracelet for high fall risk patients  - Consider moving patient to room near nurses station  Outcome: Progressing     Problem: INFECTION - ADULT  Goal: Absence or prevention of progression during hospitalization  Description: INTERVENTIONS:  - Assess and monitor for signs and symptoms of infection  - Monitor lab/diagnostic results  - Monitor all insertion sites, i e  indwelling lines, tubes, and drains  - Monitor endotracheal if appropriate and nasal secretions for changes in amount and color  - [place] appropriate cooling/warming therapies per order  - Administer medications as ordered  - Instruct and encourage patient and family to use good hand hygiene technique  - Identify and instruct in appropriate isolation precautions for identified infection/condition  Outcome: Progressing  Goal: Absence of fever/infection during neutropenic period  Description: INTERVENTIONS:  - Monitor WBC    Outcome: Progressing

## 2022-05-20 NOTE — PLAN OF CARE
Problem: PAIN - ADULT  Goal: Verbalizes/displays adequate comfort level or baseline comfort level  Description  Interventions:  - Encourage patient to monitor pain and request assistance  - Assess pain using appropriate pain scale  - Administer analgesics based on type and severity of pain and evaluate response  - Implement non-pharmacological measures as appropriate and evaluate response  - Consider cultural and social influences on pain and pain management  - Notify physician/advanced practitioner if interventions unsuccessful or patient reports new pain  Outcome: Progressing     Problem: INFECTION - ADULT  Goal: Absence or prevention of progression during hospitalization  Description  INTERVENTIONS:  - Assess and monitor for signs and symptoms of infection  - Monitor lab/diagnostic results  - Monitor all insertion sites, i e  indwelling lines, tubes, and drains  - Monitor endotracheal if appropriate and nasal secretions for changes in amount and color  - [place] appropriate cooling/warming therapies per order  - Administer medications as ordered  - Instruct and encourage patient and family to use good hand hygiene technique  - Identify and instruct in appropriate isolation precautions for identified infection/condition  Outcome: Progressing     Problem: DISCHARGE PLANNING  Goal: Discharge to home or other facility with appropriate resources  Description  INTERVENTIONS:  - Identify barriers to discharge w/patient and caregiver  - Arrange for needed discharge resources and transportation as appropriate  - Identify discharge learning needs (meds, wound care, etc )  - Arrange for interpretive services to assist at discharge as needed  - Refer to Case Management Department for coordinating discharge planning if the patient needs post-hospital services based on physician/advanced practitioner order or complex needs related to functional status, cognitive ability, or social support system  Outcome: Progressing No

## 2022-12-27 PROCEDURE — 30233N1 TRANSFUSION OF NONAUTOLOGOUS RED BLOOD CELLS INTO PERIPHERAL VEIN, PERCUTANEOUS APPROACH: ICD-10-PCS | Performed by: INTERNAL MEDICINE

## (undated) DEVICE — WIPES BABY PAMPERS SENSITIVE 36/PK

## (undated) DEVICE — SMALL NEEDLE COUNTER NEST

## (undated) DEVICE — TRAVELKIT CONTAINS FIRST STEP KIT (200ML EP-4 KIT) AND SOILED SCOPE BAG - 1 KIT: Brand: TRAVELKIT CONTAINS FIRST STEP KIT AND SOILED SCOPE BAG

## (undated) DEVICE — TROCAR: Brand: KII FIOS FIRST ENTRY

## (undated) DEVICE — SYRINGE 30ML LL

## (undated) DEVICE — SKIN MARKER DUAL TIP WITH RULER CAP, FLEXIBLE RULER AND LABELS: Brand: DEVON

## (undated) DEVICE — DRAPE SHEET THREE QUARTER

## (undated) DEVICE — NEEDLE SPINAL18G X 3.5 IN QUINCKE

## (undated) DEVICE — TOWEL SET X-RAY

## (undated) DEVICE — SYRINGE 20ML LL

## (undated) DEVICE — GLOVE INDICATOR PI UNDERGLOVE SZ 8 BLUE

## (undated) DEVICE — RADIOLOGY STERILE LABELS: Brand: CENTURION

## (undated) DEVICE — ENDOPATH 5MM CURVED SCISSORS WITH MONOPOLAR CAUTERY: Brand: ENDOPATH

## (undated) DEVICE — ADHESIVE SKIN CLSR DERMABOND NX

## (undated) DEVICE — SCD SEQUENTIAL COMPRESSION COMFORT SLEEVE MEDIUM KNEE LENGTH: Brand: KENDALL SCD

## (undated) DEVICE — LAPAROSCOPIC TROCAR SLEEVE/SINGLE USE: Brand: KII® SLEEVE

## (undated) DEVICE — PAD GROUNDING SLF ADHESIVE

## (undated) DEVICE — SYRINGE 10ML LL

## (undated) DEVICE — VIOLET BRAIDED (POLYGLACTIN 910), SYNTHETIC ABSORBABLE SUTURE: Brand: COATED VICRYL

## (undated) DEVICE — NEEDLE 18 G X 1 1/2

## (undated) DEVICE — [HIGH FLOW INSUFFLATOR,  DO NOT USE IF PACKAGE IS DAMAGED,  KEEP DRY,  KEEP AWAY FROM SUNLIGHT,  PROTECT FROM HEAT AND RADIOACTIVE SOURCES.]: Brand: PNEUMOSURE

## (undated) DEVICE — TRAY EPID CONT PERIFIX 18G X 3.5IN 5ML CLSD TIP DRAPE

## (undated) DEVICE — JACKSON-PRATT 100CC BULB RESERVOIR: Brand: CARDINAL HEALTH

## (undated) DEVICE — BAG DECANTER

## (undated) DEVICE — PLASTIC ADHESIVE BANDAGE: Brand: CURITY

## (undated) DEVICE — SYRINGE 5ML LL

## (undated) DEVICE — CHLORAPREP HI-LITE 26ML ORANGE

## (undated) DEVICE — UNIVERSAL BLOCK TRAY: Brand: INTEGRA®

## (undated) DEVICE — LAPAROSCOPIC DUAL RIGID APPLICATOR: Brand: ETHICON

## (undated) DEVICE — SUT MONOCRYL 4-0 PS-2 27 IN Y426H

## (undated) DEVICE — INTENDED FOR TISSUE SEPARATION, AND OTHER PROCEDURES THAT REQUIRE A SHARP SURGICAL BLADE TO PUNCTURE OR CUT.: Brand: BARD-PARKER SAFETY BLADES SIZE 15, STERILE

## (undated) DEVICE — ALLENTOWN LAP CHOLE APP PACK: Brand: CARDINAL HEALTH

## (undated) DEVICE — SURGICAL GOWN, XL SMARTSLEEVE: Brand: CONVERTORS

## (undated) DEVICE — PENCIL ELECTROSURG E-Z CLEAN -0035H

## (undated) DEVICE — TROCAR: Brand: KII® SLEEVE

## (undated) DEVICE — IRRIG ENDO FLO TUBING

## (undated) DEVICE — JP CHANNEL DRAIN 19FR, FULL FLUTES: Brand: JACKSON-PRATT

## (undated) DEVICE — TIBURON LAPAROSCOPIC ABDOMINAL DRAPE: Brand: CONVERTORS

## (undated) DEVICE — NEEDLE SPINAL 22G X 3.5IN  QUINCKE

## (undated) DEVICE — CVD CANNULA

## (undated) DEVICE — Device: Brand: DEFENDO AIR/WATER/SUCTION AND BIOPSY VALVE

## (undated) DEVICE — SEALANT FIBRIN VISTASEAL 10ML

## (undated) DEVICE — HARMONIC 1100 SHEARS, 36CM SHAFT LENGTH: Brand: HARMONIC

## (undated) DEVICE — NEEDLE SPINAL 25G X 3.5 IN QUINCKE

## (undated) DEVICE — NEEDLE SPINAL 20G X 3.5 LF

## (undated) DEVICE — ENDOPATH PNEUMONEEDLE INSUFFLATION NEEDLES WITH LUER LOCK CONNECTORS 120MM: Brand: ENDOPATH

## (undated) DEVICE — GLOVE SRG BIOGEL 8

## (undated) DEVICE — VIAL DECANTER